# Patient Record
Sex: FEMALE | Race: WHITE | Employment: FULL TIME | URBAN - METROPOLITAN AREA
[De-identification: names, ages, dates, MRNs, and addresses within clinical notes are randomized per-mention and may not be internally consistent; named-entity substitution may affect disease eponyms.]

---

## 2018-03-15 ENCOUNTER — HOSPITAL ENCOUNTER (OUTPATIENT)
Age: 58
Discharge: HOME | End: 2018-03-17

## 2018-03-16 LAB
HBV SURFACE AB TITR SER: NORMAL {TITER}
HEPATITIS B SURFACE ANTIGEN INTERPRETATION: NORMAL

## 2019-01-24 ENCOUNTER — HOSPITAL ENCOUNTER (OUTPATIENT)
Age: 59
Discharge: HOME | End: 2019-01-26

## 2019-01-24 PROCEDURE — 86706 HEP B SURFACE ANTIBODY: CPT

## 2019-01-25 LAB — HBV SURFACE AB TITR SER: REACTIVE {TITER}

## 2021-03-03 ENCOUNTER — OFFICE VISIT (OUTPATIENT)
Dept: PODIATRY | Age: 61
End: 2021-03-03
Payer: COMMERCIAL

## 2021-03-03 VITALS — DIASTOLIC BLOOD PRESSURE: 82 MMHG | WEIGHT: 187 LBS | TEMPERATURE: 98.2 F | SYSTOLIC BLOOD PRESSURE: 130 MMHG

## 2021-03-03 DIAGNOSIS — M25.571 CHRONIC PAIN OF RIGHT ANKLE: ICD-10-CM

## 2021-03-03 DIAGNOSIS — G89.29 CHRONIC PAIN OF RIGHT ANKLE: ICD-10-CM

## 2021-03-03 DIAGNOSIS — M79.671 PAIN IN RIGHT FOOT: Primary | ICD-10-CM

## 2021-03-03 DIAGNOSIS — M95.8 OSTEOCHONDRAL DEFECT OF ANKLE: ICD-10-CM

## 2021-03-03 DIAGNOSIS — M66.879 NONTRAUMATIC RUPTURE OF PERONEAL TENDON: ICD-10-CM

## 2021-03-03 PROCEDURE — 99203 OFFICE O/P NEW LOW 30 MIN: CPT | Performed by: PODIATRIST

## 2021-03-03 NOTE — PROGRESS NOTES
[facility] PODIATRY  [address] [facility] [place] [address]  Dept: [phone]  Dept Fax: [phone]    NEW PATIENT PROGRESS NOTE  Date of patient's visit: 3/4/2021  Patient's Name:  Geoffrey Barnes YOB: 1960            Patient Care Team:  Laura Vaughn DPM as Consulting Physician (Podiatry)        Chief Complaint   Patient presents with   [facility] Doctor     pt has had several sx's by Dr. Mari Velarde over the last 3-5 years he repaired a split tendon, depression of nerves right side and then took bone and scar tissue out. Dr. Mari Velarde last year advised pt to get a second opinion just got a second opinion now. He did MRI's and EMG several years ago and xrays        HPI  HPI:   Geoffrey Barnes is a 62 y.o. female who presents to the office today complaining of right ankle pain. This new patient is seen today for a second opinion regarding right ankle pain. Patient had surgery on her right ankle for a peroneal tendon rupture and also a sural nerve entrapment 5 years ago subsequently patient has been still having pain in the ankle patient has a hard time walking standing. Patient recently has had no other treatments. .  Symptoms began 5 year(s) ago. Patient relates pain is Present. Pain is rated 9 out of 10 and is described as intermittent. Treatments prior to today's visit include: . Currently denies F/C/N/V. Pt's primary care physician is No primary care provider on file. last seen     Allergies   Allergen Reactions    Latex      Leaves marks on skin    Aspirin      sick    Codeine      fatique    Penicillins      swelling    Sulfa Antibiotics      Family history        No past medical history on file. Prior to Admission medications    Not on File       No past surgical history on file. No family history on file.     Social History     Tobacco Use    Smoking status: Former Smoker    Smokeless tobacco: Never Used   Substance Use Topics    Alcohol use: Not on file       Review of Systems    Review of Systems:   History obtained from chart review and the patient  General ROS: negative for - chills, fatigue, fever, night sweats or weight gain  Constitutional: Negative for chills, diaphoresis, fatigue, fever and unexpected weight change. Musculoskeletal: Positive for . Neurological ROS: negative for - behavioral changes, confusion, headaches or seizures. Negative for weakness and numbness. Dermatological ROS: negative for - mole changes, rash  Cardiovascular: Negative for leg swelling. Gastrointestinal: Negative for constipation, diarrhea, nausea and vomiting. Lower Extremity Physical Examination:   Vitals:   Vitals:    03/03/21 1605   BP: 130/82   Temp: 98.2 °F (36.8 °C)        Foot Exam    Right Foot/Ankle     Muscle Strength  Ankle dorsiflexion: 3  Ankle plantar flexion: 3          Right Ankle Exam     Tenderness   The patient is experiencing tenderness in the ATF and lateral malleolus. Swelling: mild    Range of Motion   Dorsiflexion: abnormal   Plantar flexion: abnormal   Eversion: abnormal   Inversion: abnormal     Muscle Strength   Dorsiflexion:  3/5  Plantar flexion:  3/5  Anterior tibial:  3/5  Posterior tibial:  3/5  Gastrocsoleus:  3/5  Peroneal muscle:  3/5    Tests   Anterior drawer: negative  Varus tilt: negative    Other   Erythema: absent  Scars: present  Sensation: decreased  Pulse: present             General: AAO x 3 in NAD. Dermatologic Exam:  Skin lesion/ulceration Absent . Skin No rashes or nodules noted. .   Musculoskeletal:   Examination of the right ankle revealed limited range of motion both dorsiflexion plantarflexion inversion eversion. There was pain with palpation along the lateral fibula as well as the peroneal tendons. Pain with inversion. Negative pain with eversion. Negative equinus noted. There was mild discomfort noted over the sural nerve.      Vascular: +2/4 DP and PT right side.    Radiographs:  3 views Xr Ankle Right (min 3 Views)    Result Date: 3/3/2021  EXAMINATION: THREE XRAY VIEWS OF THE RIGHT ANKLE 3/3/2021 4:18 pm COMPARISON: None. HISTORY: ORDERING SYSTEM PROVIDED HISTORY: Pain in right foot FINDINGS: No fracture or dislocation of the right ankle. Tibial talar joint is maintained on weight-bearing. No radiopaque foreign body. No obvious soft tissue edema. No fracture or dislocation of the right ankle. Xr Foot Right (min 3 Views)    Result Date: 3/3/2021  EXAMINATION: THREE XRAY VIEWS OF THE RIGHT FOOT 3/3/2021 4:18 pm COMPARISON: None. HISTORY: ORDERING SYSTEM PROVIDED HISTORY: Pain in right foot TECHNOLOGIST PROVIDED HISTORY: Reason for exam:->wb FINDINGS: Radiographs of the right foot demonstrate no fractures with preserved relative alignment. Mild pes planus deformity. Mild tibiotalar joint and right large toe metatarsal phalangeal joint degenerative spurring. Anterior and posterior calcaneal enthesophytes. Osseous mineralization is normal.  No soft tissue abnormality. 1.  Mild right large toe and ankle degenerative spurring. Calcaneal enthesophytes. 2.  Mild pes planus deformity. foot/ankle:     Asessment: Patient is a 62 y.o. female with:    Diagnosis Orders   1. Pain in right foot  XR FOOT RIGHT (MIN 3 VIEWS)    XR ANKLE RIGHT (MIN 3 VIEWS)    MRI ANKLE RIGHT WO CONTRAST   2. Chronic pain of right ankle  XR FOOT RIGHT (MIN 3 VIEWS)    XR ANKLE RIGHT (MIN 3 VIEWS)    MRI ANKLE RIGHT WO CONTRAST   3. Nontraumatic rupture of peroneal tendon  MRI ANKLE RIGHT WO CONTRAST   4. Osteochondral defect of ankle  MRI ANKLE RIGHT WO CONTRAST       Plan: Patient examined and evaluated. Today I reviewed the x-ray with the patient working to get an MRI of the right ankle to rule out any osteochondral defect or any peroneal tendon issues and then a new EMG will be done to review the entrapped nerve.   Patient at this time is to be limited weightbearing I will see her back in 1 week after the MRI and EMG current condition and treatment options discussed in detail. Discussed conservative and surgical options with the patient. Treatment options today consisted of verbal and written instructions given to patient. Contact office with any questions/problems/concerns. RTC in 1week(s).     3/3/2021    Electronically signed by Rosemary Hall DPM on 3/4/2021 at 7:23 AM  3/3/2021

## 2021-03-15 ENCOUNTER — HOSPITAL ENCOUNTER (OUTPATIENT)
Dept: MRI IMAGING | Age: 61
Discharge: HOME | End: 2021-03-17
Payer: COMMERCIAL

## 2021-03-15 DIAGNOSIS — G89.29 CHRONIC PAIN OF RIGHT ANKLE: ICD-10-CM

## 2021-03-15 DIAGNOSIS — M79.671 PAIN IN RIGHT FOOT: ICD-10-CM

## 2021-03-15 DIAGNOSIS — M95.8 OSTEOCHONDRAL DEFECT OF ANKLE: ICD-10-CM

## 2021-03-15 DIAGNOSIS — M66.879 NONTRAUMATIC RUPTURE OF PERONEAL TENDON: ICD-10-CM

## 2021-03-15 DIAGNOSIS — M25.571 CHRONIC PAIN OF RIGHT ANKLE: ICD-10-CM

## 2021-03-15 PROCEDURE — 73721 MRI JNT OF LWR EXTRE W/O DYE: CPT

## 2021-03-23 ENCOUNTER — OFFICE VISIT (OUTPATIENT)
Dept: PODIATRY | Age: 61
End: 2021-03-23
Payer: COMMERCIAL

## 2021-03-23 VITALS — TEMPERATURE: 97.2 F | SYSTOLIC BLOOD PRESSURE: 130 MMHG | DIASTOLIC BLOOD PRESSURE: 70 MMHG | WEIGHT: 190 LBS

## 2021-03-23 DIAGNOSIS — S86.311D PERONEAL TENDON RUPTURE, RIGHT, SUBSEQUENT ENCOUNTER: ICD-10-CM

## 2021-03-23 DIAGNOSIS — S93.401D RUPTURE OF LIGAMENT OF ANKLE, RIGHT, SUBSEQUENT ENCOUNTER: Primary | ICD-10-CM

## 2021-03-23 PROCEDURE — 99213 OFFICE O/P EST LOW 20 MIN: CPT | Performed by: PODIATRIST

## 2021-03-23 NOTE — PROGRESS NOTES
[facility] PODIATRY  [address] [facility] [address]   Dept: [phone]  Dept Fax: [phone]     PATIENT PROGRESS NOTE  Date of patient's visit: 3/23/2021  Patient's Name:  Yecenia Ramirez YOB: 1960            Patient Care Team:  Rafael Loza DPM as Consulting Physician (Podiatry)        Chief Complaint   Patient presents with    Foot Pain     right foot we are going over MRI results from [address]        HPI  HPI:   Yecenia Ramirez is a 62 y.o. female who presents to the office today complaining of right ankle pain. This  patient is seen today for a second opinion regarding right ankle pain. Patient had surgery on her right ankle for a peroneal tendon rupture and also a sural nerve entrapment 5 years ago subsequently patient has been still having pain in the ankle patient has a hard time walking standing. Patient recently has had no other treatments. .  Symptoms began 5 year(s) ago. Patient relates pain is Present. Pain is rated 9 out of 10 and is described as intermittent. Treatments prior to today's visit include: . Currently denies F/C/N/V. Pt's primary care physician is No primary care provider on file. Patient is seen today regarding the MRI. Allergies   Allergen Reactions    Latex      Leaves marks on skin    Aspirin      sick    Codeine      fatique    Penicillins      swelling    Sulfa Antibiotics      Family history        No past medical history on file. Prior to Admission medications    Not on File       No past surgical history on file. No family history on file.     Social History     Tobacco Use    Smoking status: Former Smoker    Smokeless tobacco: Never Used   Substance Use Topics    Alcohol use: Not on file       Review of Systems    Review of Systems:   History obtained from chart review and the patient  General ROS: negative for - chills, fatigue, fever, night sweats or weight gain  Constitutional: Negative for chills, diaphoresis, fatigue, fever and unexpected weight change. Musculoskeletal: Positive for . Neurological ROS: negative for - behavioral changes, confusion, headaches or seizures. Negative for weakness and numbness. Dermatological ROS: negative for - mole changes, rash  Cardiovascular: Negative for leg swelling. Gastrointestinal: Negative for constipation, diarrhea, nausea and vomiting. Lower Extremity Physical Examination:   Vitals:   Vitals:    03/23/21 1605   BP: 130/70   Temp: 97.2 °F (36.2 °C)        Foot Exam    Right Foot/Ankle     Muscle Strength  Ankle dorsiflexion: 3  Ankle plantar flexion: 3          Right Ankle Exam     Tenderness   The patient is experiencing tenderness in the ATF and lateral malleolus. Swelling: mild    Range of Motion   Dorsiflexion: abnormal   Plantar flexion: abnormal   Eversion: abnormal   Inversion: abnormal     Muscle Strength   Dorsiflexion:  3/5  Plantar flexion:  3/5  Anterior tibial:  3/5  Posterior tibial:  3/5  Gastrocsoleus:  3/5  Peroneal muscle:  3/5    Tests   Anterior drawer: negative  Varus tilt: negative    Other   Erythema: absent  Scars: present  Sensation: decreased  Pulse: present             General: AAO x 3 in NAD. Dermatologic Exam:  Skin lesion/ulceration Absent . Skin No rashes or nodules noted. .   Musculoskeletal:   Examination of the right ankle revealed limited range of motion both dorsiflexion plantarflexion inversion eversion. There was pain with palpation along the lateral fibula as well as the peroneal tendons. Pain with inversion. Negative pain with eversion. Negative equinus noted. There was mild discomfort noted over the sural nerve. Vascular: +2/4 DP and PT right side. Radiographs:  3 views Xr Ankle Right (min 3 Views)    Result Date: 3/3/2021  EXAMINATION: THREE XRAY VIEWS OF THE RIGHT ANKLE 3/3/2021 4:18 pm COMPARISON: None.  HISTORY: ORDERING SYSTEM PROVIDED HISTORY: Pain in right foot FINDINGS: No fracture or dislocation of the right ankle. Tibial talar joint is maintained on weight-bearing. No radiopaque foreign body. No obvious soft tissue edema. No fracture or dislocation of the right ankle. Xr Foot Right (min 3 Views)    Result Date: 3/3/2021  EXAMINATION: THREE XRAY VIEWS OF THE RIGHT FOOT 3/3/2021 4:18 pm COMPARISON: None. HISTORY: ORDERING SYSTEM PROVIDED HISTORY: Pain in right foot TECHNOLOGIST PROVIDED HISTORY: Reason for exam:->wb FINDINGS: Radiographs of the right foot demonstrate no fractures with preserved relative alignment. Mild pes planus deformity. Mild tibiotalar joint and right large toe metatarsal phalangeal joint degenerative spurring. Anterior and posterior calcaneal enthesophytes. Osseous mineralization is normal.  No soft tissue abnormality. 1.  Mild right large toe and ankle degenerative spurring. Calcaneal enthesophytes. 2.  Mild pes planus deformity. foot/ankle:   Xr Ankle Right (min 3 Views)    Result Date: 3/3/2021  EXAMINATION: THREE XRAY VIEWS OF THE RIGHT ANKLE 3/3/2021 4:18 pm COMPARISON: None. HISTORY: ORDERING SYSTEM PROVIDED HISTORY: Pain in right foot FINDINGS: No fracture or dislocation of the right ankle. Tibial talar joint is maintained on weight-bearing. No radiopaque foreign body. No obvious soft tissue edema. No fracture or dislocation of the right ankle. Xr Foot Right (min 3 Views)    Result Date: 3/3/2021  EXAMINATION: THREE XRAY VIEWS OF THE RIGHT FOOT 3/3/2021 4:18 pm COMPARISON: None. HISTORY: ORDERING SYSTEM PROVIDED HISTORY: Pain in right foot TECHNOLOGIST PROVIDED HISTORY: Reason for exam:->wb FINDINGS: Radiographs of the right foot demonstrate no fractures with preserved relative alignment. Mild pes planus deformity. Mild tibiotalar joint and right large toe metatarsal phalangeal joint degenerative spurring. Anterior and posterior calcaneal enthesophytes.   Osseous mineralization is normal.  No soft tissue abnormality. 1.  Mild right large toe and ankle degenerative spurring. Calcaneal enthesophytes. 2.  Mild pes planus deformity. Mri Ankle Right Wo Contrast    Result Date: 3/19/2021  EXAMINATION: MRI OF THE RIGHT ANKLE WITHOUT CONTRAST, 3/15/2021 7:13 am TECHNIQUE: Multiplanar multisequence MRI of the right ankle was performed without the administration of intravenous contrast. COMPARISON: Radiograph of the right ankle dated 3/3/2021 HISTORY: ORDERING SYSTEM PROVIDED HISTORY: Osteochondral defect of ankle FINDINGS: SYNDESMOTIC LIGAMENTS: The syndesmotic ligaments appear intact. LATERAL COLLATERAL LIGAMENT COMPLEX: There is mild thickening of the anterior talofibular ligament, which may reflect prior sprain. The calcaneofibular ligament appears intact. The posterior talofibular ligament appears intact. DELTOID LIGAMENT COMPLEX: There is loss of fatty striations of the deep fibers of the deltoid ligament, which may reflect prior sprain. SINUS TARSI AND SPRING LIGAMENT: The fat signal intensity of the sinus tarsus appears preserved. The visualized portions of the spring ligament appear intact. MEDIAL TENDONS: There is minimal tenosynovitis affecting the posterior tibialis tendon. The flexor digitorum longus and flexor hallucis longus tendons appear intact. LATERAL TENDONS: There is increased signal intensity seen affecting the peroneus longus tendon and irregular morphology as it courses lateral to the calcaneus, likely reflecting mild to moderate tendinosis. The peroneus brevis tendon appears intact. There is minimal tenosynovitis affecting the peroneal tendons. ANTERIOR TENDONS: The anterior extensor tendons appear intact. ACHILLES TENDON: The Achilles tendon appears intact. PLANTAR FASCIA: The plantar fascia appears intact. TARSAL TUNNEL: There are no obstructing lesions within the tarsal tunnel. BONE MARROW: No evidence of acute fracture or dislocation is seen.   The talar dome appears intact without nonsurgical treatments, including risks and benefits. From a nonoperative standpoint, we discussed rest/activity modification, NSAIDs/Acetaminophen/topical anesthetics, orthotics, bracing/immobilization, and physical therapy. After conservative treatment has failed patient agrees and would like to have surgery today, we reviewed all aspects of the surgery including the risks the complications and the postop course there are no guarantees,   Patient agrees with surgery   we reviewed all risk and benefits all complications including anesthesia infection further surgery over under correction patient agrees to proceed with surgery. General the terms and procedures of the treatment were undertaken there was alternative procedures and methods discussed with the patient the patient declined these and opted for surgery. All the risks with the procedure were reviewed.   Patient will have surgery at [address] [place] [address] in the future       3/23/2021    Electronically signed by Rosi Alexis DPM on 3/23/2021 at 4:54 PM  3/23/2021

## 2021-03-23 NOTE — PATIENT INSTRUCTIONS
Surgery will be scheduled on 5/7/21 at [facility] in  [place]  They will contact you to give you all your preop instructions  Do not eat or drink anything after midnight on 5/6/2021 nothing morning of surgery  Call Leland Dunlap if any issues [phone]    Someone needs to drive you to and from the surgery   And no driving 2-3 weeks after surgery    No anti-inflammatories 7 days prior to sx

## 2021-03-25 ENCOUNTER — TELEPHONE (OUTPATIENT)
Dept: PODIATRY | Age: 61
End: 2021-03-25

## 2021-04-05 ENCOUNTER — PREP FOR PROCEDURE (OUTPATIENT)
Dept: PODIATRY | Age: 61
End: 2021-04-05

## 2021-04-15 ENCOUNTER — TELEPHONE (OUTPATIENT)
Dept: ADMINISTRATIVE | Age: 61
End: 2021-04-15

## 2021-04-15 ENCOUNTER — OFFICE VISIT (OUTPATIENT)
Dept: PRIMARY CARE CLINIC | Age: 61
End: 2021-04-15
Payer: COMMERCIAL

## 2021-04-15 VITALS
DIASTOLIC BLOOD PRESSURE: 82 MMHG | BODY MASS INDEX: 32.61 KG/M2 | RESPIRATION RATE: 16 BRPM | HEIGHT: 64 IN | OXYGEN SATURATION: 98 % | HEART RATE: 98 BPM | SYSTOLIC BLOOD PRESSURE: 126 MMHG | TEMPERATURE: 97.6 F | WEIGHT: 191 LBS

## 2021-04-15 DIAGNOSIS — R94.31 ABNORMAL EKG: ICD-10-CM

## 2021-04-15 DIAGNOSIS — Z01.818 PRE-OPERATIVE EXAMINATION: ICD-10-CM

## 2021-04-15 DIAGNOSIS — Z01.818 PREOP EXAMINATION: Primary | ICD-10-CM

## 2021-04-15 DIAGNOSIS — Z01.818 PRE-OPERATIVE EXAMINATION: Primary | ICD-10-CM

## 2021-04-15 DIAGNOSIS — Z01.810 PRE-OPERATIVE CARDIOVASCULAR EXAMINATION: ICD-10-CM

## 2021-04-15 PROBLEM — E03.9 HYPOTHYROIDISM: Status: ACTIVE | Noted: 2021-04-15

## 2021-04-15 LAB
ALBUMIN SERPL-MCNC: 4.7 G/DL (ref 3.5–5.2)
ALP BLD-CCNC: 76 U/L (ref 35–104)
ALT SERPL-CCNC: 19 U/L (ref 0–32)
ANION GAP SERPL CALCULATED.3IONS-SCNC: 15 MMOL/L (ref 7–16)
AST SERPL-CCNC: 16 U/L (ref 0–31)
BACTERIA: ABNORMAL /HPF
BASOPHILS ABSOLUTE: 0.06 E9/L (ref 0–0.2)
BASOPHILS RELATIVE PERCENT: 1.3 % (ref 0–2)
BILIRUB SERPL-MCNC: 0.3 MG/DL (ref 0–1.2)
BILIRUBIN URINE: NEGATIVE
BLOOD, URINE: NEGATIVE
BUN BLDV-MCNC: 17 MG/DL (ref 8–23)
C-REACTIVE PROTEIN: 0.3 MG/DL (ref 0–0.4)
CALCIUM SERPL-MCNC: 9.9 MG/DL (ref 8.6–10.2)
CHLORIDE BLD-SCNC: 104 MMOL/L (ref 98–107)
CHOLESTEROL, TOTAL: 296 MG/DL (ref 0–199)
CLARITY: CLEAR
CO2: 25 MMOL/L (ref 22–29)
COLOR: YELLOW
CREAT SERPL-MCNC: 0.8 MG/DL (ref 0.5–1)
CREATININE URINE: 118 MG/DL (ref 29–226)
EOSINOPHILS ABSOLUTE: 0.19 E9/L (ref 0.05–0.5)
EOSINOPHILS RELATIVE PERCENT: 4 % (ref 0–6)
EPITHELIAL CELLS, UA: ABNORMAL /HPF
GFR AFRICAN AMERICAN: >60
GFR NON-AFRICAN AMERICAN: >60 ML/MIN/1.73
GLUCOSE BLD-MCNC: 122 MG/DL (ref 74–99)
GLUCOSE URINE: NEGATIVE MG/DL
HBA1C MFR BLD: 5.5 % (ref 4–5.6)
HCT VFR BLD CALC: 44 % (ref 34–48)
HDLC SERPL-MCNC: 87 MG/DL
HEMOGLOBIN: 13.9 G/DL (ref 11.5–15.5)
IMMATURE GRANULOCYTES #: 0 E9/L
IMMATURE GRANULOCYTES %: 0 % (ref 0–5)
KETONES, URINE: NEGATIVE MG/DL
LDL CHOLESTEROL CALCULATED: 189 MG/DL (ref 0–99)
LEUKOCYTE ESTERASE, URINE: ABNORMAL
LYMPHOCYTES ABSOLUTE: 1.16 E9/L (ref 1.5–4)
LYMPHOCYTES RELATIVE PERCENT: 24.4 % (ref 20–42)
MCH RBC QN AUTO: 28.4 PG (ref 26–35)
MCHC RBC AUTO-ENTMCNC: 31.6 % (ref 32–34.5)
MCV RBC AUTO: 89.8 FL (ref 80–99.9)
MICROALBUMIN UR-MCNC: <12 MG/L
MICROALBUMIN/CREAT UR-RTO: ABNORMAL (ref 0–30)
MONOCYTES ABSOLUTE: 0.47 E9/L (ref 0.1–0.95)
MONOCYTES RELATIVE PERCENT: 9.9 % (ref 2–12)
NEUTROPHILS ABSOLUTE: 2.87 E9/L (ref 1.8–7.3)
NEUTROPHILS RELATIVE PERCENT: 60.4 % (ref 43–80)
NITRITE, URINE: NEGATIVE
PDW BLD-RTO: 12.9 FL (ref 11.5–15)
PH UA: 7.5 (ref 5–9)
PLATELET # BLD: 214 E9/L (ref 130–450)
PMV BLD AUTO: 11.6 FL (ref 7–12)
POTASSIUM SERPL-SCNC: 4.6 MMOL/L (ref 3.5–5)
PROTEIN UA: NEGATIVE MG/DL
RBC # BLD: 4.9 E12/L (ref 3.5–5.5)
RBC UA: ABNORMAL /HPF (ref 0–2)
SODIUM BLD-SCNC: 144 MMOL/L (ref 132–146)
SPECIFIC GRAVITY UA: 1.01 (ref 1–1.03)
T3 FREE: 2.7 PG/ML (ref 2–4.4)
T4 FREE: 0.97 NG/DL (ref 0.93–1.7)
TOTAL PROTEIN: 7.4 G/DL (ref 6.4–8.3)
TRIGL SERPL-MCNC: 98 MG/DL (ref 0–149)
TROPONIN: <0.01 NG/ML (ref 0–0.03)
TSH SERPL DL<=0.05 MIU/L-ACNC: 2.6 UIU/ML (ref 0.27–4.2)
URIC ACID, SERUM: 4.4 MG/DL (ref 2.4–5.7)
UROBILINOGEN, URINE: 1 E.U./DL
VLDLC SERPL CALC-MCNC: 20 MG/DL
WBC # BLD: 4.8 E9/L (ref 4.5–11.5)
WBC UA: ABNORMAL /HPF (ref 0–5)

## 2021-04-15 PROCEDURE — 99244 OFF/OP CNSLTJ NEW/EST MOD 40: CPT | Performed by: FAMILY MEDICINE

## 2021-04-15 PROCEDURE — 93000 ELECTROCARDIOGRAM COMPLETE: CPT | Performed by: FAMILY MEDICINE

## 2021-04-15 ASSESSMENT — PATIENT HEALTH QUESTIONNAIRE - PHQ9
SUM OF ALL RESPONSES TO PHQ QUESTIONS 1-9: 0
SUM OF ALL RESPONSES TO PHQ QUESTIONS 1-9: 0
2. FEELING DOWN, DEPRESSED OR HOPELESS: 0

## 2021-04-15 NOTE — TELEPHONE ENCOUNTER
NP scheduled from the [facility]. Patient Appointment Form:      PCP: Dr. Reji Aguilar  Referring: Dr. Reji Aguilar    Has the Patient:    Seen a Cardiologist? No     Had a heart catheterization? No    Had heart surgery? No    Had a stress test or nuclear stress test? No    Had an echocardiogram? No    Had a vascular ultrasound? No    Had a 24/48 heart monitor or extended cardiac event monitor? No    Had recent blood work in the last 6 months? Yes 4/15/21 Epic - PCP     Had a pacemaker/ICD/ILR implant? No    Seen an Electrophysiologist? No        Will send records via: No prior Cardiology hx or recs. Date & time of appointment:  4/19/21 @ 7:45 with Dr. Charu Shankar.

## 2021-04-15 NOTE — LETTER
[address], [address]  Phone: [phone]  Fax: [phone]    Christa Bueno DO          To Dr Clementina Colón:    It is my medical opinion that Angela Larkin  is deemed to be an acceptable risk and is medically optimized for upcoming surgery pending stress test and blood work. It is my belief that she will be an acceptable candidate for any sedation that is planned for the above noted procedure. If you have any questions or concerns, please don't hesitate to call.     Sincerely,        Christa Bueno DO

## 2021-04-15 NOTE — PROGRESS NOTES
Subjective:      Melecio Kign is a 62 y.o. female who presents to the office today for a preoperative consultation at the request of surgeon Dr Kulwinder Garcia who plans on performing right peroneal tendon repair on April 27. This consultation is requested for the specific conditions prompting preoperative evaluation (i.e. because of potential affect on operative risk): Abnormal EKG and possible unknown cardiac disease. Planned anesthesia is General.  The patient has the following known anesthesia issues: None  Patient has a bleeding risk of : no recent abnormal bleeding, no remote history of abnormal bleeding, no use of Ca-channel blockers  Patient does not have objection to receiving blood products if needed. History reviewed. No pertinent past medical history. Patient Active Problem List    Diagnosis Date Noted    Hypothyroidism 04/15/2021     History reviewed. No pertinent surgical history. History reviewed. No pertinent family history.   Social History     Socioeconomic History    Marital status:      Spouse name: None    Number of children: None    Years of education: None    Highest education level: None   Occupational History    None   Social Needs    Financial resource strain: None    Food insecurity     Worry: None     Inability: None    Transportation needs     Medical: None     Non-medical: None   Tobacco Use    Smoking status: Former Smoker    Smokeless tobacco: Never Used   Substance and Sexual Activity    Alcohol use: None    Drug use: None    Sexual activity: None   Lifestyle    Physical activity     Days per week: None     Minutes per session: None    Stress: None   Relationships    Social connections     Talks on phone: None     Gets together: None     Attends Anabaptism service: None     Active member of club or organization: None     Attends meetings of clubs or organizations: None     Relationship status: None    Intimate partner violence     Fear of current or ex partner: None     Emotionally abused: None     Physically abused: None     Forced sexual activity: None   Other Topics Concern    None   Social History Narrative    None     No current outpatient medications on file. No current facility-administered medications for this visit. No current outpatient medications on file prior to visit. No current facility-administered medications on file prior to visit. Allergies   Allergen Reactions    Latex      Leaves marks on skin    Aspirin Hives and Shortness Of Breath     sick  Other reaction(s): Disease type AND/OR category unknown (finding), GI Upset    Codeine Hives and Shortness Of Breath     fatique  Other reaction(s): Disease type AND/OR category unknown (finding)    Penicillins Hives and Shortness Of Breath     swelling  Other reaction(s): Disease type AND/OR category unknown (finding), GI Upset  headaches      Soap Hives and Shortness Of Breath    Bee Venom      Other reaction(s): Disease type AND/OR category unknown (finding)    Sulfa Antibiotics      Family history      Review of Systems  Pertinent items are noted in HPI. Objective:       Physical Exam  Vitals signs reviewed. HENT:      Head: Normocephalic and atraumatic. Eyes:      General: No scleral icterus. Conjunctiva/sclera: Conjunctivae normal.      Pupils: Pupils are equal, round, and reactive to light. Neck:      Musculoskeletal: Neck supple. Thyroid: No thyromegaly. Cardiovascular:      Rate and Rhythm: Normal rate and regular rhythm. Heart sounds: Normal heart sounds. No murmur. Pulmonary:      Effort: Pulmonary effort is normal.      Breath sounds: Decreased breath sounds present. No rales. Abdominal:      General: Bowel sounds are normal. There is no distension. Palpations: Abdomen is soft. Tenderness: There is no abdominal tenderness. Musculoskeletal:         General: Tenderness and signs of injury present.    Lymphadenopathy:      Cervical: No cervical adenopathy. Skin:     General: Skin is warm and dry. Findings: No erythema or rash. Neurological:      Mental Status: She is alert and oriented to person, place, and time. Cranial Nerves: No cranial nerve deficit. Gait: Gait abnormal.   Psychiatric:         Judgment: Judgment normal.           Predictors of intubation difficulty:   Morbid obesity? no   Anatomically abnormal facies? no   Prominent incisors? no   Receding mandible? no   Short, thick neck? no   Neck range of motion: normal      Cardiographics  EKG shows ventricular rate of 74 bpm, AK interval of 164, QTC of 410 with QRS duration of 106. Rhythm is sinus with leftward axis. RSR prime in V1 with right bundle branch block and anterior fascicular block noted. No acute ST segment or T wave changes noted. Deep S wave in moderate T wave flattening in lead III without reciprocal changes. No other changes noted. Assessment:        62 y.o. female with planned surgery as above. Known risk factors for perioperative complications: Unknown cardiac disease    Difficulty with intubation is not anticipated. Current medications which may produce withdrawal symptoms if withheld perioperatively: None   Plan:      1. Preoperative workup as follows cardiac stress testing to exclude undiagnosed coronary disease (abnormal EKG noted), ECG, hemoglobin, hematocrit, electrolytes, creatinine, glucose, liver function studies  2. Change in medication regimen before surgery: none, continue med regimen including morning of surgery, w/sip of water  3. Prophylaxis for cardiac events with perioperative beta-blockers: should be considered, specific regimen per anesthesia  4. Invasive hemodynamic monitoring perioperatively: at the discretion of anesthesiologist  5. Deep vein thrombosis prophylaxis postoperatively:regimen to be chosen by surgical team  6.  Surveillance for postoperative MI with ECG immediately postoperatively and on

## 2021-04-16 ENCOUNTER — TELEPHONE (OUTPATIENT)
Dept: CARDIOLOGY | Age: 61
End: 2021-04-16

## 2021-04-16 LAB — HEPATITIS C ANTIBODY INTERPRETATION: NORMAL

## 2021-04-16 NOTE — TELEPHONE ENCOUNTER
SCHEDULED STRESS TEST FOR 04-28-21. REVIEWED COVID CHECKLIST WITH PATIENT.     Electronically signed by Phil Aguirre on 4/16/2021 at 12:34 PM

## 2021-04-19 ENCOUNTER — OFFICE VISIT (OUTPATIENT)
Dept: CARDIOLOGY CLINIC | Age: 61
End: 2021-04-19
Payer: COMMERCIAL

## 2021-04-19 ENCOUNTER — TELEPHONE (OUTPATIENT)
Dept: CARDIOLOGY | Age: 61
End: 2021-04-19

## 2021-04-19 VITALS
DIASTOLIC BLOOD PRESSURE: 84 MMHG | HEART RATE: 64 BPM | BODY MASS INDEX: 32.64 KG/M2 | HEIGHT: 64 IN | SYSTOLIC BLOOD PRESSURE: 145 MMHG | WEIGHT: 191.2 LBS | RESPIRATION RATE: 18 BRPM

## 2021-04-19 DIAGNOSIS — Z01.810 PREOP CARDIOVASCULAR EXAM: Primary | ICD-10-CM

## 2021-04-19 DIAGNOSIS — M79.673 PAIN OF FOOT, UNSPECIFIED LATERALITY: ICD-10-CM

## 2021-04-19 DIAGNOSIS — R94.31 ABNORMAL EKG: ICD-10-CM

## 2021-04-19 LAB
% CKMB: 0 % (ref 0–4)
CK-BB: 0 % (ref 0–0)
CK-MACRO TYPE I: 0 % (ref 0–0)
CK-MACRO TYPE II: 0 % (ref 0–0)
CK-MM: 100 % (ref 96–100)
TOTAL CK: 74 U/L (ref 20–180)

## 2021-04-19 PROCEDURE — 93000 ELECTROCARDIOGRAM COMPLETE: CPT | Performed by: INTERNAL MEDICINE

## 2021-04-19 PROCEDURE — 99244 OFF/OP CNSLTJ NEW/EST MOD 40: CPT | Performed by: INTERNAL MEDICINE

## 2021-04-19 NOTE — TELEPHONE ENCOUNTER
PATIENT CAME IN TODAY TO SEE DR. WONG AS A NEW PATIENT. SHE WAS SCHEDULED FOR A JIE STRESS TEST FOR 04-28 ORDERED BY . DR. WONG WANTED THE TEST CANCELLED.     Electronically signed by Kalpana Garcia on 4/19/2021 at 8:16 AM

## 2021-04-19 NOTE — PROGRESS NOTES
Chief Complaint   Patient presents with    Cardiac Clearance       Patient Active Problem List    Diagnosis Date Noted    Abnormal EKG 2021    Hypothyroidism 04/15/2021       No current outpatient medications on file. No current facility-administered medications for this visit.          Allergies   Allergen Reactions    Latex      Leaves marks on skin    Aspirin Hives and Shortness Of Breath     sick  Other reaction(s): Disease type AND/OR category unknown (finding), GI Upset    Codeine Hives and Shortness Of Breath     fatique  Other reaction(s): Disease type AND/OR category unknown (finding)    Penicillins Hives and Shortness Of Breath     swelling  Other reaction(s): Disease type AND/OR category unknown (finding), GI Upset  headaches      Soap Hives and Shortness Of Breath    Bee Venom      Other reaction(s): Disease type AND/OR category unknown (finding)    Sulfa Antibiotics      Family history        Vitals:    21 0740   BP: (!) 145/84   Pulse: 64   Resp: 18   Weight: 191 lb 3.2 oz (86.7 kg)   Height: 5' 4\" (1.626 m)       Social History     Socioeconomic History    Marital status:      Spouse name: Not on file    Number of children: Not on file    Years of education: Not on file    Highest education level: Not on file   Occupational History    Not on file   Social Needs    Financial resource strain: Not on file    Food insecurity     Worry: Not on file     Inability: Not on file    Transportation needs     Medical: Not on file     Non-medical: Not on file   Tobacco Use    Smoking status: Former Smoker     Types: Cigarettes     Quit date: 1996     Years since quittin.0    Smokeless tobacco: Never Used   Substance and Sexual Activity    Alcohol use: Not Currently    Drug use: Never    Sexual activity: Not on file   Lifestyle    Physical activity     Days per week: Not on file     Minutes per session: Not on file    Stress: Not on file   Relationships    Neck supple. No hepatojugular reflux and no JVD present. Carotid bruit is not present. No tracheal deviation present. No thyromegaly present. Cardiovascular: Normal rate, regular rhythm, normal heart sounds and intact distal pulses. Exam reveals no gallop and no friction rub. No murmur heard. Pulmonary/Chest: Effort normal and breath sounds normal. No respiratory distress. No wheezes. No rales. No tenderness. Abdominal: Soft. Bowel sounds are normal. No distension and no mass. No tenderness. No rebound and no guarding. Musculoskeletal: Normal range of motion. No edema and no tenderness. Neurological: Alert and oriented to person, place, and time. Skin: Skin is warm and dry. No rash noted. Not diaphoretic. No erythema. Psychiatric: Normal mood and affect. Behavior is normal.     EKG:  normal sinus rhythm, rate64, axis -63, IRBB with LAFB.     ASSESSMENT AND PLAN:  Patient Active Problem List   Diagnosis    Hypothyroidism    Abnormal EKG     ·  Patient has no high risk clinical predictors of an adverse outcome in surgery, such as recent myocardial infarction, unstable angina, heart failure, rhythm other than sinus, severe obstructive valvular disease,CVA, INSULIN, CKD  · Able to exceed 4 METS with AODLs  · Normal CV exam  · ekg with clinically irrelevant mild conduction disease  · RCRI Class I risk (< 1% chance of cardiac complications)  · OK for surgery without stress test.         Alfredo Gomez M.D  [id number] [facility]

## 2021-04-26 ENCOUNTER — HOSPITAL ENCOUNTER (OUTPATIENT)
Age: 61
Discharge: HOME | End: 2021-04-28
Payer: COMMERCIAL

## 2021-04-26 DIAGNOSIS — Z01.818 PREOP TESTING: ICD-10-CM

## 2021-04-26 PROCEDURE — U0003 INFECTIOUS AGENT DETECTION BY NUCLEIC ACID (DNA OR RNA); SEVERE ACUTE RESPIRATORY SYNDROME CORONAVIRUS 2 (SARS-COV-2) (CORONAVIRUS DISEASE [COVID-19]), AMPLIFIED PROBE TECHNIQUE, MAKING USE OF HIGH THROUGHPUT TECHNOLOGIES AS DESCRIBED BY CMS-2020-01-R: HCPCS

## 2021-04-26 PROCEDURE — U0005 INFEC AGEN DETEC AMPLI PROBE: HCPCS

## 2021-04-27 LAB
SARS-COV-2: NOT DETECTED
SOURCE: NORMAL

## 2021-04-27 NOTE — PROGRESS NOTES
[facility] PRE-ADMISSION TESTING INSTRUCTIONS    The Preadmission Testing patient is instructed accordingly using the following criteria (check applicable):    ARRIVAL INSTRUCTIONS:  [x] Parking the day of Surgery is located in the Main Entrance lot. Upon entering the door, make an immediate right to the surgery reception desk    [x] Bring photo ID and insurance card    [] Bring in a copy of Living will or Durable Power of  papers. [x] Please be sure to arrange for responsible adult to provide transportation to and from the hospital    [x] Please arrange for responsible adult to be with you for the 24 hour period post procedure due to having anesthesia      GENERAL INSTRUCTIONS:    [x] Nothing by mouth after midnight, including gum, candy, mints or water    [x] You may brush your teeth, but do not swallow any water    [] Take medications as instructed with 1-2 oz of water    [] Stop herbal supplements and vitamins 5 days prior to procedure    [] Follow preop dosing of blood thinners per physician instructions    [] Take 1/2 dose of evening insulin, but no insulin after midnight    [] No oral diabetic medications after midnight    [] If diabetic and have low blood sugar or feel symptomatic, take 1-2oz apple juice only    [] Bring inhalers day of surgery    [] Bring C-PAP/ Bi-Pap day of surgery    [] Bring urine specimen day of surgery    [x] Shower or bath with soap, lather and rinse well, AM of Surgery, no lotion, powders or creams to surgical site    [] Follow bowel prep as instructed per surgeon    [x] No tobacco products within 24 hours of surgery     [x] No alcohol or illegal drug use within 24 hours of surgery.     [x] Jewelry, body piercing's, eyeglasses, contact lenses and dentures are not permitted into surgery (bring cases)      [x] Please do not wear any nail polish, make up or hair products on the day of surgery    [x] You can expect a call the business day prior to

## 2021-04-27 NOTE — PROGRESS NOTES
Have you been tested for COVID  Yes           Have you been told you were positive for COVID No  Have you had any known exposure to someone that is positive for COVID No  Do you have a cough                   No              Do you have shortness of breath No                 Do you have a sore throat            No                Are you having chills                    No                Are you having muscle aches. No                    Please come to the hospital wearing a mask and have your significant other wear a mask as well. Both of you should check your temperature before leaving to come here,  if it is 100 or higher please call [phone] for instruction.

## 2021-04-30 ENCOUNTER — ANESTHESIA (OUTPATIENT)
Dept: OPERATING ROOM | Age: 61
End: 2021-04-30
Payer: COMMERCIAL

## 2021-04-30 ENCOUNTER — ANESTHESIA EVENT (OUTPATIENT)
Dept: OPERATING ROOM | Age: 61
End: 2021-04-30
Payer: COMMERCIAL

## 2021-04-30 ENCOUNTER — PREP FOR PROCEDURE (OUTPATIENT)
Dept: PODIATRY | Age: 61
End: 2021-04-30

## 2021-04-30 ENCOUNTER — APPOINTMENT (OUTPATIENT)
Dept: GENERAL RADIOLOGY | Age: 61
End: 2021-04-30
Attending: PODIATRIST
Payer: COMMERCIAL

## 2021-04-30 ENCOUNTER — HOSPITAL ENCOUNTER (OUTPATIENT)
Age: 61
Setting detail: OUTPATIENT SURGERY
Discharge: HOME | End: 2021-04-30
Attending: PODIATRIST | Admitting: PODIATRIST
Payer: COMMERCIAL

## 2021-04-30 VITALS
SYSTOLIC BLOOD PRESSURE: 159 MMHG | HEIGHT: 64 IN | BODY MASS INDEX: 32.44 KG/M2 | WEIGHT: 190 LBS | OXYGEN SATURATION: 95 % | DIASTOLIC BLOOD PRESSURE: 74 MMHG | TEMPERATURE: 96.8 F | HEART RATE: 59 BPM | RESPIRATION RATE: 19 BRPM

## 2021-04-30 VITALS — OXYGEN SATURATION: 96 % | DIASTOLIC BLOOD PRESSURE: 100 MMHG | SYSTOLIC BLOOD PRESSURE: 162 MMHG | TEMPERATURE: 96.4 F

## 2021-04-30 DIAGNOSIS — G89.18 PAIN FOLLOWING SURGERY OR PROCEDURE: Primary | ICD-10-CM

## 2021-04-30 DIAGNOSIS — R52 PAIN: ICD-10-CM

## 2021-04-30 DIAGNOSIS — Z01.818 PREOP TESTING: Primary | ICD-10-CM

## 2021-04-30 PROCEDURE — 27698 REPAIR OF ANKLE LIGAMENT: CPT | Performed by: PODIATRIST

## 2021-04-30 PROCEDURE — 2709999900 HC NON-CHARGEABLE SUPPLY: Performed by: PODIATRIST

## 2021-04-30 PROCEDURE — 88304 TISSUE EXAM BY PATHOLOGIST: CPT

## 2021-04-30 PROCEDURE — 7100000000 HC PACU RECOVERY - FIRST 15 MIN: Performed by: PODIATRIST

## 2021-04-30 PROCEDURE — 6360000002 HC RX W HCPCS: Performed by: NURSE ANESTHETIST, CERTIFIED REGISTERED

## 2021-04-30 PROCEDURE — 7100000010 HC PHASE II RECOVERY - FIRST 15 MIN: Performed by: PODIATRIST

## 2021-04-30 PROCEDURE — 7100000011 HC PHASE II RECOVERY - ADDTL 15 MIN: Performed by: PODIATRIST

## 2021-04-30 PROCEDURE — 3700000000 HC ANESTHESIA ATTENDED CARE: Performed by: PODIATRIST

## 2021-04-30 PROCEDURE — 6370000000 HC RX 637 (ALT 250 FOR IP): Performed by: ANESTHESIOLOGY

## 2021-04-30 PROCEDURE — 3209999900 FLUORO FOR SURGICAL PROCEDURES

## 2021-04-30 PROCEDURE — 2580000003 HC RX 258: Performed by: NURSE ANESTHETIST, CERTIFIED REGISTERED

## 2021-04-30 PROCEDURE — 3600000003 HC SURGERY LEVEL 3 BASE: Performed by: PODIATRIST

## 2021-04-30 PROCEDURE — 7100000001 HC PACU RECOVERY - ADDTL 15 MIN: Performed by: PODIATRIST

## 2021-04-30 PROCEDURE — 3700000001 HC ADD 15 MINUTES (ANESTHESIA): Performed by: PODIATRIST

## 2021-04-30 PROCEDURE — 27659 REPAIR OF LEG TENDON EACH: CPT | Performed by: PODIATRIST

## 2021-04-30 PROCEDURE — 2500000003 HC RX 250 WO HCPCS: Performed by: PODIATRIST

## 2021-04-30 PROCEDURE — 6360000002 HC RX W HCPCS: Performed by: PODIATRIST

## 2021-04-30 PROCEDURE — 2580000003 HC RX 258: Performed by: PODIATRIST

## 2021-04-30 PROCEDURE — 0232T NJX PLATELET PLASMA: CPT | Performed by: PODIATRIST

## 2021-04-30 PROCEDURE — 2720000010 HC SURG SUPPLY STERILE: Performed by: PODIATRIST

## 2021-04-30 PROCEDURE — C1713 ANCHOR/SCREW BN/BN,TIS/BN: HCPCS | Performed by: PODIATRIST

## 2021-04-30 PROCEDURE — 2500000003 HC RX 250 WO HCPCS: Performed by: NURSE ANESTHETIST, CERTIFIED REGISTERED

## 2021-04-30 PROCEDURE — C1762 CONN TISS, HUMAN(INC FASCIA): HCPCS | Performed by: PODIATRIST

## 2021-04-30 PROCEDURE — 64708 REVISE ARM/LEG NERVE: CPT | Performed by: PODIATRIST

## 2021-04-30 PROCEDURE — 3600000013 HC SURGERY LEVEL 3 ADDTL 15MIN: Performed by: PODIATRIST

## 2021-04-30 DEVICE — GRAFT HUM TISS W40XL70MM THK1MM ACELLULAR ARTHROFLEX: Type: IMPLANTABLE DEVICE | Site: ANKLE | Status: FUNCTIONAL

## 2021-04-30 DEVICE — ANCHOR SUT NDL DX FIBERTAK: Type: IMPLANTABLE DEVICE | Site: ANKLE | Status: FUNCTIONAL

## 2021-04-30 DEVICE — DEVICE GRFT FIX FOR LIGMNT AUG ANCHR REP PEEK INT BRAC: Type: IMPLANTABLE DEVICE | Site: ANKLE | Status: FUNCTIONAL

## 2021-04-30 RX ORDER — NEOSTIGMINE METHYLSULFATE 1 MG/ML
INJECTION, SOLUTION INTRAVENOUS PRN
Status: DISCONTINUED | OUTPATIENT
Start: 2021-04-30 | End: 2021-04-30 | Stop reason: SDUPTHER

## 2021-04-30 RX ORDER — SODIUM CHLORIDE 0.9 % (FLUSH) 0.9 %
5-40 SYRINGE (ML) INJECTION PRN
Status: CANCELLED | OUTPATIENT
Start: 2021-04-30

## 2021-04-30 RX ORDER — SODIUM CHLORIDE 0.9 % (FLUSH) 0.9 %
5-40 SYRINGE (ML) INJECTION PRN
Status: DISCONTINUED | OUTPATIENT
Start: 2021-04-30 | End: 2021-04-30 | Stop reason: HOSPADM

## 2021-04-30 RX ORDER — FENTANYL CITRATE 50 UG/ML
INJECTION, SOLUTION INTRAMUSCULAR; INTRAVENOUS PRN
Status: DISCONTINUED | OUTPATIENT
Start: 2021-04-30 | End: 2021-04-30 | Stop reason: SDUPTHER

## 2021-04-30 RX ORDER — LIDOCAINE HYDROCHLORIDE 20 MG/ML
INJECTION, SOLUTION EPIDURAL; INFILTRATION; INTRACAUDAL; PERINEURAL PRN
Status: DISCONTINUED | OUTPATIENT
Start: 2021-04-30 | End: 2021-04-30 | Stop reason: SDUPTHER

## 2021-04-30 RX ORDER — SODIUM CHLORIDE 0.9 % (FLUSH) 0.9 %
5-40 SYRINGE (ML) INJECTION EVERY 12 HOURS SCHEDULED
Status: CANCELLED | OUTPATIENT
Start: 2021-04-30

## 2021-04-30 RX ORDER — SODIUM CHLORIDE 9 MG/ML
INJECTION, SOLUTION INTRAVENOUS CONTINUOUS PRN
Status: DISCONTINUED | OUTPATIENT
Start: 2021-04-30 | End: 2021-04-30 | Stop reason: SDUPTHER

## 2021-04-30 RX ORDER — MIDAZOLAM HYDROCHLORIDE 1 MG/ML
INJECTION INTRAMUSCULAR; INTRAVENOUS PRN
Status: DISCONTINUED | OUTPATIENT
Start: 2021-04-30 | End: 2021-04-30 | Stop reason: SDUPTHER

## 2021-04-30 RX ORDER — BUPIVACAINE HYDROCHLORIDE 5 MG/ML
INJECTION, SOLUTION EPIDURAL; INTRACAUDAL PRN
Status: DISCONTINUED | OUTPATIENT
Start: 2021-04-30 | End: 2021-04-30 | Stop reason: ALTCHOICE

## 2021-04-30 RX ORDER — FENTANYL CITRATE 50 UG/ML
25 INJECTION, SOLUTION INTRAMUSCULAR; INTRAVENOUS EVERY 5 MIN PRN
Status: DISCONTINUED | OUTPATIENT
Start: 2021-04-30 | End: 2021-04-30 | Stop reason: HOSPADM

## 2021-04-30 RX ORDER — ROCURONIUM BROMIDE 10 MG/ML
INJECTION, SOLUTION INTRAVENOUS PRN
Status: DISCONTINUED | OUTPATIENT
Start: 2021-04-30 | End: 2021-04-30 | Stop reason: SDUPTHER

## 2021-04-30 RX ORDER — GLYCOPYRROLATE 1 MG/5 ML
SYRINGE (ML) INTRAVENOUS PRN
Status: DISCONTINUED | OUTPATIENT
Start: 2021-04-30 | End: 2021-04-30 | Stop reason: SDUPTHER

## 2021-04-30 RX ORDER — SODIUM CHLORIDE 9 MG/ML
25 INJECTION, SOLUTION INTRAVENOUS PRN
Status: CANCELLED | OUTPATIENT
Start: 2021-04-30

## 2021-04-30 RX ORDER — SODIUM CHLORIDE 0.9 % (FLUSH) 0.9 %
5-40 SYRINGE (ML) INJECTION EVERY 12 HOURS SCHEDULED
Status: DISCONTINUED | OUTPATIENT
Start: 2021-04-30 | End: 2021-04-30 | Stop reason: HOSPADM

## 2021-04-30 RX ORDER — PROPOFOL 10 MG/ML
INJECTION, EMULSION INTRAVENOUS PRN
Status: DISCONTINUED | OUTPATIENT
Start: 2021-04-30 | End: 2021-04-30 | Stop reason: SDUPTHER

## 2021-04-30 RX ORDER — HYDROCODONE BITARTRATE AND ACETAMINOPHEN 5; 325 MG/1; MG/1
1 TABLET ORAL EVERY 6 HOURS PRN
Qty: 20 TABLET | Refills: 0 | Status: SHIPPED | OUTPATIENT
Start: 2021-04-30 | End: 2021-05-07

## 2021-04-30 RX ORDER — HYDROCODONE BITARTRATE AND ACETAMINOPHEN 5; 325 MG/1; MG/1
1 TABLET ORAL
Status: COMPLETED | OUTPATIENT
Start: 2021-04-30 | End: 2021-04-30

## 2021-04-30 RX ORDER — ONDANSETRON 2 MG/ML
INJECTION INTRAMUSCULAR; INTRAVENOUS PRN
Status: DISCONTINUED | OUTPATIENT
Start: 2021-04-30 | End: 2021-04-30 | Stop reason: SDUPTHER

## 2021-04-30 RX ORDER — LABETALOL HYDROCHLORIDE 5 MG/ML
INJECTION, SOLUTION INTRAVENOUS PRN
Status: DISCONTINUED | OUTPATIENT
Start: 2021-04-30 | End: 2021-04-30 | Stop reason: SDUPTHER

## 2021-04-30 RX ORDER — SODIUM CHLORIDE 9 MG/ML
25 INJECTION, SOLUTION INTRAVENOUS PRN
Status: DISCONTINUED | OUTPATIENT
Start: 2021-04-30 | End: 2021-04-30 | Stop reason: HOSPADM

## 2021-04-30 RX ADMIN — MIDAZOLAM 2 MG: 1 INJECTION INTRAMUSCULAR; INTRAVENOUS at 07:07

## 2021-04-30 RX ADMIN — FENTANYL CITRATE 50 MCG: 50 INJECTION, SOLUTION INTRAMUSCULAR; INTRAVENOUS at 07:24

## 2021-04-30 RX ADMIN — PROPOFOL 180 MG: 10 INJECTION, EMULSION INTRAVENOUS at 07:16

## 2021-04-30 RX ADMIN — Medication 3 MG: at 08:32

## 2021-04-30 RX ADMIN — FENTANYL CITRATE 50 MCG: 50 INJECTION, SOLUTION INTRAMUSCULAR; INTRAVENOUS at 09:00

## 2021-04-30 RX ADMIN — HYDROCODONE BITARTRATE AND ACETAMINOPHEN 1 TABLET: 5; 325 TABLET ORAL at 10:05

## 2021-04-30 RX ADMIN — LABETALOL HYDROCHLORIDE 5 MG: 5 INJECTION INTRAVENOUS at 08:14

## 2021-04-30 RX ADMIN — LIDOCAINE HYDROCHLORIDE 100 MG: 20 INJECTION, SOLUTION EPIDURAL; INFILTRATION; INTRACAUDAL; PERINEURAL at 07:16

## 2021-04-30 RX ADMIN — VANCOMYCIN HYDROCHLORIDE 1500 MG: 500 INJECTION, POWDER, LYOPHILIZED, FOR SOLUTION INTRAVENOUS at 07:07

## 2021-04-30 RX ADMIN — FENTANYL CITRATE 50 MCG: 50 INJECTION, SOLUTION INTRAMUSCULAR; INTRAVENOUS at 07:12

## 2021-04-30 RX ADMIN — Medication 0.5 MG: at 08:31

## 2021-04-30 RX ADMIN — ROCURONIUM BROMIDE 40 MG: 10 INJECTION, SOLUTION INTRAVENOUS at 07:16

## 2021-04-30 RX ADMIN — SODIUM CHLORIDE: 9 INJECTION, SOLUTION INTRAVENOUS at 07:07

## 2021-04-30 RX ADMIN — FENTANYL CITRATE 50 MCG: 50 INJECTION, SOLUTION INTRAMUSCULAR; INTRAVENOUS at 08:29

## 2021-04-30 RX ADMIN — ONDANSETRON 4 MG: 2 INJECTION INTRAMUSCULAR; INTRAVENOUS at 08:16

## 2021-04-30 RX ADMIN — LABETALOL HYDROCHLORIDE 5 MG: 5 INJECTION INTRAVENOUS at 08:20

## 2021-04-30 ASSESSMENT — PULMONARY FUNCTION TESTS
PIF_VALUE: 26
PIF_VALUE: 15
PIF_VALUE: 0
PIF_VALUE: 23
PIF_VALUE: 2
PIF_VALUE: 28
PIF_VALUE: 15
PIF_VALUE: 0
PIF_VALUE: 29
PIF_VALUE: 24
PIF_VALUE: 8
PIF_VALUE: 29
PIF_VALUE: 32
PIF_VALUE: 27
PIF_VALUE: 30
PIF_VALUE: 28
PIF_VALUE: 4
PIF_VALUE: 9
PIF_VALUE: 28
PIF_VALUE: 27
PIF_VALUE: 28
PIF_VALUE: 29
PIF_VALUE: 23
PIF_VALUE: 15
PIF_VALUE: 31
PIF_VALUE: 28
PIF_VALUE: 29
PIF_VALUE: 24
PIF_VALUE: 28
PIF_VALUE: 29
PIF_VALUE: 28
PIF_VALUE: 32
PIF_VALUE: 28
PIF_VALUE: 1
PIF_VALUE: 6
PIF_VALUE: 1
PIF_VALUE: 28
PIF_VALUE: 28
PIF_VALUE: 18
PIF_VALUE: 24
PIF_VALUE: 28
PIF_VALUE: 30
PIF_VALUE: 5
PIF_VALUE: 5
PIF_VALUE: 15
PIF_VALUE: 28
PIF_VALUE: 29
PIF_VALUE: 5
PIF_VALUE: 28
PIF_VALUE: 29
PIF_VALUE: 8
PIF_VALUE: 31
PIF_VALUE: 28
PIF_VALUE: 33
PIF_VALUE: 24

## 2021-04-30 ASSESSMENT — PAIN SCALES - GENERAL: PAINLEVEL_OUTOF10: 6

## 2021-04-30 NOTE — ANESTHESIA PRE PROCEDURE
Department of Anesthesiology  Preprocedure Note       Name:  Sylvie Pitts   Age:  62 y.o.  :  1960                                          MRN:  [id number]         Date:  2021      Surgeon: Rebeca Canales):  Giulia Nash DPM    Procedure: Procedure(s):  REPAIR ANTERIOR TALAR FIBULAR LIGAMENT RIGHT FOOT REPAIR PERONEAL TENDON RIGHT FOOT POSSIBLE SURAL NERVE LYSIS   ++ARTHREX++     ++LATEX ALLERGY++    Medications prior to admission:   Prior to Admission medications    Not on File       Current medications:    No current facility-administered medications for this encounter. Allergies:     Allergies   Allergen Reactions    Latex      Leaves marks on skin    Aspirin Hives and Shortness Of Breath     sick  Other reaction(s): Disease type AND/OR category unknown (finding), GI Upset    Codeine Hives and Shortness Of Breath     fatique  Other reaction(s): Disease type AND/OR category unknown (finding)    Penicillins Hives and Shortness Of Breath     swelling  Other reaction(s): Disease type AND/OR category unknown (finding), GI Upset  headaches      Soap Hives and Shortness Of Breath    Bee Venom      Other reaction(s): Disease type AND/OR category unknown (finding)    Sulfa Antibiotics      Family history        Problem List:    Patient Active Problem List   Diagnosis Code    Hypothyroidism E03.9    Abnormal EKG R94.31       Past Medical History:        Diagnosis Date    Asthma     Right foot pain        Past Surgical History:        Procedure Laterality Date     SECTION      FOOT SURGERY Right     Tendon repair    HYSTERECTOMY      KNEE SURGERY Bilateral     MENISCECTOMY Right        Social History:    Social History     Tobacco Use    Smoking status: Former Smoker     Types: Cigarettes     Quit date: 1996     Years since quittin.0    Smokeless tobacco: Never Used   Substance Use Topics    Alcohol use: Not Currently                                Counseling given: Not Answered      Vital Signs (Current):   Vitals:    04/27/21 1546 04/30/21 0608   BP:  (!) 162/81   Pulse:  62   Resp:  20   Temp:  96.8 °F (36 °C)   SpO2:  98%   Weight: 190 lb (86.2 kg) 190 lb (86.2 kg)   Height: 5' 4\" (1.626 m) 5' 4\" (1.626 m)                                              BP Readings from Last 3 Encounters:   04/30/21 (!) 162/81   04/19/21 (!) 145/84   04/15/21 126/82       NPO Status: Time of last liquid consumption: 2120                        Time of last solid consumption: 2120                        Date of last liquid consumption: 04/29/21                        Date of last solid food consumption: 04/29/21    BMI:   Wt Readings from Last 3 Encounters:   04/30/21 190 lb (86.2 kg)   04/19/21 191 lb 3.2 oz (86.7 kg)   04/15/21 191 lb (86.6 kg)     Body mass index is 32.61 kg/m². CBC:   Lab Results   Component Value Date    WBC 4.8 04/15/2021    RBC 4.90 04/15/2021    HGB 13.9 04/15/2021    HCT 44.0 04/15/2021    MCV 89.8 04/15/2021    RDW 12.9 04/15/2021     04/15/2021       CMP:   Lab Results   Component Value Date     04/15/2021    K 4.6 04/15/2021     04/15/2021    CO2 25 04/15/2021    BUN 17 04/15/2021    CREATININE 0.8 04/15/2021    GFRAA >60 04/15/2021    LABGLOM >60 04/15/2021    GLUCOSE 122 04/15/2021    PROT 7.4 04/15/2021    CALCIUM 9.9 04/15/2021    BILITOT 0.3 04/15/2021    ALKPHOS 76 04/15/2021    AST 16 04/15/2021    ALT 19 04/15/2021       POC Tests: No results for input(s): POCGLU, POCNA, POCK, POCCL, POCBUN, POCHEMO, POCHCT in the last 72 hours.     Coags: No results found for: PROTIME, INR, APTT    HCG (If Applicable): No results found for: PREGTESTUR, PREGSERUM, HCG, HCGQUANT     ABGs: No results found for: PHART, PO2ART, OCK1UAJ, DAF5PGQ, BEART, L3RCSULZ     Type & Screen (If Applicable):  No results found for: LABABO, LABRH    Drug/Infectious Status (If Applicable):  No results found for: HIV, HEPCAB    COVID-19 Screening (If Applicable):   Lab Results Component Value Date    COVID19 Not Detected 04/26/2021           Anesthesia Evaluation  Patient summary reviewed no history of anesthetic complications:   Airway: Mallampati: III  TM distance: >3 FB   Neck ROM: full  Mouth opening: > = 3 FB Dental: normal exam         Pulmonary: breath sounds clear to auscultation  (+) asthma:                            Cardiovascular:Negative CV ROS            Rhythm: regular             Beta Blocker:  Not on Beta Blocker         Neuro/Psych:   Negative Neuro/Psych ROS              GI/Hepatic/Renal: Neg GI/Hepatic/Renal ROS            Endo/Other:    (+) hypothyroidism (off medications per personal choice)::., .                 Abdominal:           Vascular: negative vascular ROS. Anesthesia Plan      general     ASA 2       Induction: intravenous. MIPS: Postoperative opioids intended and Prophylactic antiemetics administered. Anesthetic plan and risks discussed with patient. Plan discussed with CRNA. Debra Lambert DO   4/30/2021     DOS STAFF ADDENDUM:    Pt seen and examined, physical exam updated, chart reviewed including anesthesia, drug and allergy history. H&P reviewed. No interval changes to history or physical examination (unless noted above). NPO status confirmed. Anesthetic plan, risks, benefits, alternatives discussed with patient. Patient verbalized an understanding and agrees to proceed.      Debra Lambert DO  Staff Anesthesiologist  6:50 AM

## 2021-04-30 NOTE — BRIEF OP NOTE
Brief Postoperative Note      Patient: Romana Begin  YOB: 1960  MRN: [id number]    Date of Procedure: 4/30/2021    Pre-Op Diagnosis: RUPTURED ANTERIOR TALAR FIBULAR LIGAMENT PERONEAL TENDON RUPTURE SURAL Neuritis    Post-Op Diagnosis: Same       Procedure(s):  REPAIR ANTERIOR TALAR FIBULAR LIGAMENT RIGHT FOOT REPAIR PERONEAL TENDON RIGHT FOOT, SURAL NERVE LYSIS  W PRP    Surgeon(s):  Sam Naranjo DPM    Assistant:  Resident: Miguel Zimmerman    Anesthesia: General    Estimated Blood Loss (mL): 5cc     Complications: None    Specimens:   ID Type Source Tests Collected by Time Destination   A : RIGHT ANKLE OSSICLE Bone Bone SURGICAL PATHOLOGY Sam Naranjo DPM 4/30/2021 0739    B : SURAL NERVE Tissue Tissue SURGICAL PATHOLOGY Sam Naranjo DPM 4/30/2021 7192        Implants:  Implant Name Type Inv. Item Serial No.  Lot No. LRB No. Used Action   DEVICE GRFT FIX FOR LIGMNT [facility] REP PEEK INT BRAC  DEVICE GRFT FIX FOR LIGMNT [facility] REP PEEK INT BRAC  [address] [facility]- [id number] Right 1 Implanted   GRAFT HUM TISS [id number] THK1MM ACELLULAR ARTHROFLEX - [id number]  GRAFT HUM TISS [id number] THK1MM ACELLULAR ARTHROFLEX [id number] [facility] TISSUE BANK-  Right 1 Implanted         Drains: * No LDAs found *    Findings:   Ankle stabilization performed utilizing Arthrex internal brace. Sural neurectomy to the right side was also performed with transection as well as burying into the kager's fat pad. Peroneal longus as well as brevis were identified and freed from adhesions within the peroneal retinaculum and a graft jacket was applied of the peroneal longus. Injection of PRP was then performed to both major surgical sites.     Electronically signed by Miguel Zimmerman on 4/30/2021 at 9:02 AM

## 2021-04-30 NOTE — ANESTHESIA POSTPROCEDURE EVALUATION
Department of Anesthesiology  Postprocedure Note    Patient: Ajay Pichardo  MRN: [id number]  YOB: 1960  Date of evaluation: 4/30/2021  Time:  3:36 PM     Procedure Summary     Date: 04/30/21 Room / Location: [facility] OR 07 / [facility]    Anesthesia Start: 5228 Anesthesia Stop: 2319    Procedure: REPAIR ANTERIOR TALAR FIBULAR LIGAMENT RIGHT FOOT REPAIR PERONEAL TENDON RIGHT FOOT, SURAL NERVE LYSIS  W PRP (Right Ankle) Diagnosis: (RUPTURED ANTERIOR TALAR FIBULAR LIGAMENT PERONEAL TENDON RUPTURE SURAL NERVE)    Surgeons: Riley Abel DPM Responsible Provider: Naveed Zheng DO    Anesthesia Type: general ASA Status: 2          Anesthesia Type: general    Jasen Phase I: Jasen Score: 10    Jasen Phase II: Jasen Score: 10    Last vitals: Reviewed and per EMR flowsheets.        Anesthesia Post Evaluation    Patient location during evaluation: PACU  Patient participation: complete - patient participated  Level of consciousness: awake and alert  Airway patency: patent  Nausea & Vomiting: no nausea and no vomiting  Complications: no  Cardiovascular status: hemodynamically stable  Respiratory status: acceptable  Hydration status: euvolemic

## 2021-04-30 NOTE — OP NOTE
Operative Note      Patient: Bree Alfred  YOB: 1960  MRN: [id number]    Date of Procedure: 4/30/2021    Pre-Op Diagnosis: RUPTURED ANTERIOR TALAR FIBULAR LIGAMENT PERONEAL TENDON RUPTURE SURAL neuritis    Post-Op Diagnosis: Same       Procedure(s):  REPAIR ANTERIOR TALAR FIBULAR LIGAMENT RIGHT FOOT REPAIR PERONEAL TENDON RIGHT FOOT, SURAL NERVE LYSIS  W PRP    Surgeon(s):  Alexsandra Hooper DPM    Assistant:   Resident: Keith Rosado    Anesthesia: General    Estimated Blood Loss (mL): 5cc  Complications: None    Specimens:   ID Type Source Tests Collected by Time Destination   A : RIGHT ANKLE OSSICLE Bone Bone SURGICAL PATHOLOGY Alexsandra Hooper DPM 4/30/2021 0739    B : SURAL NERVE Tissue Tissue SURGICAL PATHOLOGY Alexsandra Hooper DPM 4/30/2021 7556        Implants:  Implant Name Type Inv. Item Serial No.  Lot No. LRB No. Used Action   DEVICE GRFT FIX FOR LIGMNT [facility] REP PEEK INT BRAC  DEVICE GRFT FIX FOR LIGMNT [facility] REP PEEK INT BRAC  [employer] [id number] Right 1 Implanted   GRAFT HUM TISS [id number] THK1MM ACELLULAR ARTHROFLEX - [id number]  GRAFT HUM TISS [id number] THK1MM ACELLULAR ARTHROFLEX [id number] [facility] TISSUE BANK-  Right 1 Implanted   Injectables: 20 cc of 0.5% plain Marcaine  Materials: 2-0 Vicryl, 3-0 Vicryl, 3-0 nylon. Jumpstart dressing      Drains: * No LDAs found *    Detailed Description of Procedure:   Patient is a pleasant 23-year-old female well-known to our practice with complaints of chronic and worsening pain to the right ankle as well as noted instability both clinically as well as objectively.   The patient has undergone previous procedures to the right ankle by an additional surgeon (suspect superficial peroneal nerve release as well as prior peroneal tendon surgery.)  The patient has failed conservative treatment including but not limited to shoe gear modification, activity modification, NSAIDs, etc.  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 Patient is brought to the OR and transition to the operative table in supine position where general anesthesia was administered. A well-padded thigh tourniquet was applied to the right thigh. The right lower extremity was prepped and draped in normal sterile fashion lowered to the operative field. The right thigh tourniquet was inflated following exsanguination of the extremity to 350 mmHg. Attention was directed to the lateral aspect of the right ankle where an approximately 5 cm long incision was utilized with a 15 blade down through level skin and subcutaneous tissue. Additional lateral ankle dissection was carried down to the level of the joint where the anterior lateral component of the talar body was identified as well as the anterior component of the distal fibula. There was noted to be 2 free floating ossicles present to the distal anterior aspect of the fibula which likely suggest prior avulsion. There did not appear acutely avulsed from the distal fibula at this time they did appear to be chronically present, and likely contributing to the instability present to the anterior lateral ankle. These pieces were then sent to pathology. Utilizing traditional system fixation 2 small anchors were inserted into the fibula and the needles were utilized to grasp the inferior extensor retinaculum and approximate them back to their origin site on the fibula. Additionally a large suture anchor was inserted into the anterior lateral body of the talus and utilized to supplement the approximation of the inferior extensor retinaculum tissue, this was then additionally attached to the anterior distal fibula.   It was noted that there is considerable attenuation of the anterolateral ankle ligaments and there is definitely laxity noted previous to the repair. The incision was then closed in layered fashion with Vicryl deep and nylon superficially. Attention was then directed approximately 3 cm distally where an additional 5 cm long curvilinear incision was made overlying the peroneal tendon course utilizing 15 blade. Additional dissection was carried down utilizing a Metzenbaum scissors to the level subcutaneous tissue until the sural nerve was identified. There was definitely noted adhesions within the soft tissue overlying the nerve. The decision interoperatively was made to transect the nerve and buried the proximal component within Kager's fat pad. A section of the transected nerve was excised and additionally sent to pathology. The peroneal tendon sheath was then opened utilizing a Metzenbaum scissors. It was noted that there was considerable adhesion to both the peroneal brevis tendon distally as well as the peroneal longus tendon proximally in the region distal to the fibula. There is also noted to be mild irregularities of the proximal component of the peroneal longus that we felt warranted address meant with a graft jacket. The graft jacket was sutured to the peroneal longus tendon and the proximal component of it with Vicryl suture. The peroneal retinaculum was then repaired and the sheath was approximated utilizing Vicryl. Layered closure was then performed utilizing Vicryl deep and nylon superficially. Hold blood present obtained from the patient's IV and spun down in order to utilize PRP for injection approximately 5 cc of PRP was injected to both sites deep to aid in the healing process. Additionally 20 cc of 0.5% plain Marcaine was injected proximally to the incision sites for postoperative anesthesia purposes. The tourniquet was dropped for a total time of 81 minutes and the incision was dressed with jumpstart dressing, 4 x 4, Kerlix, stockinette, web roll, posterior splint, Ace.   The ankle was held in 90 degrees until the posterior splint was hardened. Patient is instructed remain nonweightbearing to the operative extremity at this time. She is instructed take her pain medication as instructed. She is to follow-up with her surgeon in 1 week's time. She tolerated procedure well. She is to contact the office if any complications arise during the postoperative period.     Electronically signed by Jd Escobedo on 4/30/2021 at 9:05 AM

## 2021-05-04 ENCOUNTER — OFFICE VISIT (OUTPATIENT)
Dept: PODIATRY | Age: 61
End: 2021-05-04

## 2021-05-04 VITALS — TEMPERATURE: 97.8 F

## 2021-05-04 DIAGNOSIS — G89.18 PAIN FOLLOWING SURGERY OR PROCEDURE: Primary | ICD-10-CM

## 2021-05-04 DIAGNOSIS — S93.401D RUPTURE OF LIGAMENT OF ANKLE, RIGHT, SUBSEQUENT ENCOUNTER: ICD-10-CM

## 2021-05-04 DIAGNOSIS — Z09 POSTOPERATIVE EXAMINATION: ICD-10-CM

## 2021-05-04 PROCEDURE — 99024 POSTOP FOLLOW-UP VISIT: CPT | Performed by: PODIATRIST

## 2021-05-14 ENCOUNTER — OFFICE VISIT (OUTPATIENT)
Dept: PODIATRY | Age: 61
End: 2021-05-14

## 2021-05-14 VITALS — TEMPERATURE: 97.1 F

## 2021-05-14 DIAGNOSIS — Z09 POSTOPERATIVE EXAMINATION: Primary | ICD-10-CM

## 2021-05-14 PROCEDURE — 99024 POSTOP FOLLOW-UP VISIT: CPT | Performed by: PODIATRIST

## 2021-05-14 NOTE — PROGRESS NOTES
Postop Progress Note    Subjective    Sylvie Pitts presents to the office 2 weeks  following ankle sx. Eating a regular diet without difficulty. Objective    Vitals:    05/14/21 1635   Temp: 97.1 °F (36.2 °C)     General: alert, cooperative and no distress  Incision: healing well, no drainage, no erythema, no hernia, no seroma, no swelling, well approximated    Assessment    Doing well postoperatively. Plan   1. Continue any current medications  2. Wound care discussed  3. Wound/Incision: healing well, no drainage, no erythema, no hernia, no seroma, no swelling, well approximated, sutures removed  4. Disposition:   No heavy lifting. Should not return to gym class or sports until cleared by a physician. 5. Diet: regular diet  6. Follow up: 1 week. Patient Name: Sylvie Pitts     Suture/ Staple Removal Procedure Note  Indication: Wound healed incision is healed there is no signs of erythematous wound dehiscence or drainage    Procedure: The patient was placed in the appropriate position the wound was aseptically prepped with chlora prep   and the sutures were removed without difficulty. The site was  dressed  the area has healed with no complications      The patient tolerated the procedure well. Complications: None    Cam Walker nonweightbearing 2 more weeks. Patient may get foot wet tomorrow  Signed informed consent was obtained from the patient. Patient applied the cam walker to the affected limb. This device fit well. Patient was given written and verbal instructions regarding this cam walker. This is medically necessary to help reduce pain and promote and expedite healing.         Electronically signed by Giulia Nash DPM on 5/14/2021 at 4:55 PM

## 2021-05-21 ENCOUNTER — OFFICE VISIT (OUTPATIENT)
Dept: PODIATRY | Age: 61
End: 2021-05-21

## 2021-05-21 VITALS — RESPIRATION RATE: 16 BRPM | TEMPERATURE: 97.8 F

## 2021-05-21 DIAGNOSIS — Z09 POSTOPERATIVE EXAMINATION: Primary | ICD-10-CM

## 2021-05-21 PROCEDURE — 99024 POSTOP FOLLOW-UP VISIT: CPT | Performed by: PODIATRIST

## 2021-06-02 ENCOUNTER — OFFICE VISIT (OUTPATIENT)
Dept: PODIATRY | Age: 61
End: 2021-06-02

## 2021-06-02 VITALS — DIASTOLIC BLOOD PRESSURE: 74 MMHG | TEMPERATURE: 97.7 F | SYSTOLIC BLOOD PRESSURE: 133 MMHG

## 2021-06-02 DIAGNOSIS — Z09 POSTOPERATIVE EXAMINATION: Primary | ICD-10-CM

## 2021-06-02 PROCEDURE — 99024 POSTOP FOLLOW-UP VISIT: CPT | Performed by: PODIATRIST

## 2021-06-02 NOTE — PROGRESS NOTES
Postop Progress Note    Subjective pt seen for right ankle sx      Xavi Fisher presents to the office 5 weeks  following surgery . Eating a regular diet without difficulty. Objective    Vitals:    06/02/21 0703   BP: 133/74   Temp: 97.7 °F (36.5 °C)     General: alert, cooperative and no distress  Incision: healing well, no drainage, no erythema, no hernia, no seroma, no swelling, well approximated    Assessment    Encounter Diagnosis   Name Primary?  Postoperative examination Yes       Doing well postoperatively. Plan   1. Continue any current medications  2. Wound care discussed  3. Wound/Incision: healing well, no drainage, no erythema, no hernia, no seroma, no swelling, well approximated  4. Disposition:   Limited activities. No heavy lifting. No strenuous exercise. Should not return to gym class or sports until cleared by a physician. 5. Diet: regular diet  6. Follow up: 2 weeks.            Electronically signed by Ramon Najera DPM on 6/2/2021 at 7:14 AM

## 2021-06-21 ENCOUNTER — OFFICE VISIT (OUTPATIENT)
Dept: PODIATRY | Age: 61
End: 2021-06-21

## 2021-06-21 VITALS — TEMPERATURE: 97.9 F | DIASTOLIC BLOOD PRESSURE: 70 MMHG | SYSTOLIC BLOOD PRESSURE: 118 MMHG

## 2021-06-21 DIAGNOSIS — Z09 POSTOPERATIVE EXAMINATION: ICD-10-CM

## 2021-06-21 DIAGNOSIS — S86.311D PERONEAL TENDON RUPTURE, RIGHT, SUBSEQUENT ENCOUNTER: ICD-10-CM

## 2021-06-21 DIAGNOSIS — M25.571 CHRONIC PAIN OF RIGHT ANKLE: ICD-10-CM

## 2021-06-21 DIAGNOSIS — S93.401D RUPTURE OF LIGAMENT OF ANKLE, RIGHT, SUBSEQUENT ENCOUNTER: Primary | ICD-10-CM

## 2021-06-21 DIAGNOSIS — G89.29 CHRONIC PAIN OF RIGHT ANKLE: ICD-10-CM

## 2021-06-21 DIAGNOSIS — G89.18 PAIN FOLLOWING SURGERY OR PROCEDURE: ICD-10-CM

## 2021-06-21 PROCEDURE — 99024 POSTOP FOLLOW-UP VISIT: CPT | Performed by: PODIATRIST

## 2021-06-21 NOTE — PROGRESS NOTES
Postop Progress Note    Subjective    Deb Morrison presents to the office 8 weeks  following ankle surgery  . Eating a regular diet without difficulty. Objective    Vitals:    06/21/21 0744   BP: 118/70   Temp: 97.9 °F (36.6 °C)     General: alert, cooperative and no distress  Incision: healing well    Assessment    Doing well postoperatively. Plan   1. Continue any current medications  2. Wound care discussed  3. Wound/Incision: healing well  4. Disposition:   Pt is to increase activities as tolerated. May return to work on 7- .  5. Diet: regular diet  6. Follow up: 3 weeks. pt to start physical therapy   D/c cam walker   Ankle Stirrup Brace Dispensing  Signed informed consent was obtained from the patient. Brace was fitted and applied. Patient was counseled. Brace is medically necessary to promote and expedite healing ad reduce pain and swelling.         Electronically signed by Kirti De Jesus DPM on 6/21/2021 at 8:00 AM done

## 2021-06-24 ENCOUNTER — EVALUATION (OUTPATIENT)
Dept: PHYSICAL THERAPY | Age: 61
End: 2021-06-24
Payer: COMMERCIAL

## 2021-06-24 DIAGNOSIS — G89.18 PAIN FOLLOWING SURGERY OR PROCEDURE: ICD-10-CM

## 2021-06-24 DIAGNOSIS — M25.571 CHRONIC PAIN OF RIGHT ANKLE: ICD-10-CM

## 2021-06-24 DIAGNOSIS — S93.401D RUPTURE OF LIGAMENT OF ANKLE, RIGHT, SUBSEQUENT ENCOUNTER: ICD-10-CM

## 2021-06-24 DIAGNOSIS — S86.311D PERONEAL TENDON RUPTURE, RIGHT, SUBSEQUENT ENCOUNTER: Primary | ICD-10-CM

## 2021-06-24 DIAGNOSIS — G89.29 CHRONIC PAIN OF RIGHT ANKLE: ICD-10-CM

## 2021-06-24 PROCEDURE — 97110 THERAPEUTIC EXERCISES: CPT | Performed by: PHYSICAL THERAPIST

## 2021-06-24 PROCEDURE — 97161 PT EVAL LOW COMPLEX 20 MIN: CPT | Performed by: PHYSICAL THERAPIST

## 2021-06-24 NOTE — PROGRESS NOTES
[facility]   Phone: [phone]             Fax: [phone]      Physical Therapy Daily Treatment Note    Date: 2021  Patient Name: Sylvie Pitts  : 1960   MRN: [id number]  DOInjury:    DOSx: 2021   Referring Provider: Giulia Nash DPM  [address], [address] [facility] Diagnosis:   A66 (ICD-10-CM) - Postoperative examination   G89.18 (ICD-10-CM) - Pain following surgery or procedure   S93.401D (ICD-10-CM) - Rupture of ligament of ankle, right, subsequent encounter   S86.311D (ICD-10-CM) - Peroneal tendon rupture, right, subsequent encounter   M25.571, G89.29 (ICD-10-CM) - Chronic pain of right ankle     Outcome Measure:  LEFS 42/80    S: See eval.   O:   Time 1015- 1054     Visit  Repeat outcome measure at mid point and end. Pain 6/10     ROM Right:   AROM: 10° Dorsiflexion,  40° Plantarflexion, 10° Inversion, 12° Eversion      Modalities            Manual            Stretch                  Exercise      Bike      Ankle 4 way motion      Ankle T band      Ankle bosu motion      Ankle alphabet 1 x  HEP TE   SLS      Ankle circles   x 10  CW, CCW ; HEP TE   TG squats      TG calf raises      Step-ups - FWD      Step-ups - LAT      Step-ups - BWD        NMR To improve balance for safe community and home ambulation    Resisted walk      FWD      BKWD      lat      March      Side stepping      Retro walk      Heel to toe      A:  Tolerated well. Above added to written HEP.   P: Continue with rehab plan  Ramon Chinchilla, PT DPT, PT [id number]    Treatment Charges: Mins Units   Initial Evaluation 31 1   Re-Evaluation     Ther Exercise         TE 8 1   Manual Therapy     MT     Ther Activities        TA     Gait Training          GT     Neuro Re-education NR     Modalities     Non-Billable Service Time     Other     Total Time/Units 39 2

## 2021-06-24 NOTE — PROGRESS NOTES
[address] [facility]   Phone: [phone]   Fax: [phone]         Date:  2021   Patient: Derick Long  : 1960  MRN: [id number]  Referring Provider: Kellen Petersen DPM  [address], [address] [facility] Diagnosis:   B66 (ICD-10-CM) - Postoperative examination   G89.18 (ICD-10-CM) - Pain following surgery or procedure   S93.401D (ICD-10-CM) - Rupture of ligament of ankle, right, subsequent encounter   S86.311D (ICD-10-CM) - Peroneal tendon rupture, right, subsequent encounter   M25.571, G89.29 (ICD-10-CM) - Chronic pain of right ankle       SUBJECTIVE:     Onset date: years     Onset: Unknown    Mechanism of Injury: Pt reports hurt ankle in . Dr. Artemio Del Valle did surgery on ankle at that time. Pt reports got a second opinion with Dr. Erinn Luu in March of this year and had surgery . Pt reports initially after surgery was NWB at least 2 weeks. Pt reports was given aircast to wear not but not sure if she has to wear it all the time and is allowed to weight bear now. Recently switched to aircast from walking boot. Previous PT: yes     Medical Management for Current Problem: none    Chief complaint: tightness    Behavior: condition is getting better    Pain:   Current: 6/10     Best: 10     Worst:/10    Symptom Type/Quality: sharp, tingling  Location[de-identified] Ankle: lateral side  Into bottom of heel     Aggravated by: up moving too much    Relieved by: reduced weightbearing, and elevate with ice     Imaging results: No results found.     Past Medical History  Past Medical History:   Diagnosis Date    Asthma     Right foot pain      Past Surgical History:   Procedure Laterality Date    ANKLE SURGERY Right 2021    REPAIR ANTERIOR TALAR FIBULAR LIGAMENT RIGHT FOOT REPAIR PERONEAL TENDON RIGHT FOOT, SURAL NERVE LYSIS  W PRP performed by Kellen Petersen DPM at [address] [place] [address] Right     Tendon repair   Dee Mahmood or recovery include. Domestic Concerns:  [x] No  [] Yes:    Short Term goals (3 weeks)   Decrease reported pain to 0-6/10   Increase ROM to AROM: 15° Dorsiflexion,  45° Plantarflexion, 20° Inversion, 20° Eversion    Increase Strength to 4+/5    Able to perform/complete the following functions/tasks: pt able to perform 10 sit to stands with 1 UE support with minor pain/limitation. Pt able to go up/down 5 steps with 1 rail with minor pain/limitation. Pt able to walk 20 minutes with minor pain/limitation.  Lower Extremity Functional Scale (LEFS) 50/80 impairment    Long Term goals (6 weeks)   Decrease reported pain to 0-3/10   Increase ROM to AROM: 20° Dorsiflexion,  50° Plantarflexion, 35° Inversion, 25° Eversion   Increase strength to 5/5   Able to complete the following functions/tasks: pt able to perform 10 sit to stands without UE support with no pain/limitation. Pt able to go up/down flight of steps with no pain/limitation. Pt able to walk 30+ minutes with no pain/limitation.  LEFS 60/80 impairment   Independent with home exercise program (HEP)    Rehab Potential: [x] Good  [] Fair  [] Poor    PLAN       Treatment Plan:   [x] Therapeutic Exercise  [x] Therapeutic Activity  [x] Neuromuscular Re-education   [x] Gait Training  [x] Balance Training  [x] Aerobic conditioning  [x] Manual Therapy  [x] Massage/Fascial release   [] Work/Sport specific activities    [] Pain Neuroscience [x] Cold/hotpack  [] Vasocompression  [x] Electrical Stimulation  [] Lumbar/Cervical Traction  [] Ultrasound   [] Iontophoresis: 4 mg/mL Dexamethasone Sodium Phosphate 40-80 mAmin        [x] Instruction in HEP      []  Medication allergies reviewed for use of Dexamethasone Sodium Phosphate 4mg/ml  with iontophoresis treatments. Patient is not allergic.       The following CPT codes are likely to be used in the care of this patient: 455 1011 PT Evaluation: Low Complexity , 32138 PT Re-Evaluation , 26433 Therapeutic Exercise , [id number] Neuromuscular Re-Education , 35963 Therapeutic Activities , 77165 Manual Therapy , 48585 Gait Training ,  Electrical Stimulation      Suggested Professional Referral: [x] No  [] Yes:     Patient Education:  [x] Plans/Goals, Risks/Benefits discussed  [x] Home exercise program  Method of Education: [x] Verbal  [x] Demo  [x] Written  Comprehension of Education:  [x] Verbalizes understanding. [x] Demonstrates understanding. [] Needs Review. [] Demonstrates/verbalizes understanding of HEP/Ed previously given. Frequency: 2 days per week for 6 weeks    Patient understands diagnosis/prognosis and consents to treatment, plan and goals: [x] Yes    [] No     Thank you for the opportunity to work with your patient. If you have questions or comments, please contact me at numbers listed above. Electronically signed by: Alla Ibrahim, PT DPT, PT [id number]         Please sign Physician's Certification and return to: [address] [place] [address]  Dept: [phone]  Dept Fax: [phone] Certification / Comments     Frequency/Duration 2 days per week for 6 weeks. Certification period from 6/24/2021  to 8/6/2021. I have reviewed the Plan of Care established for skilled therapy services and certify that the services are required and that they will be provided while the patient is under my care.     Physician's Comments/Revisions:               Physician's Printed Name:                                           [de-identified] Signature:                                                               Date:

## 2021-06-28 ENCOUNTER — TREATMENT (OUTPATIENT)
Dept: PHYSICAL THERAPY | Age: 61
End: 2021-06-28
Payer: COMMERCIAL

## 2021-06-28 DIAGNOSIS — S86.311D PERONEAL TENDON RUPTURE, RIGHT, SUBSEQUENT ENCOUNTER: Primary | ICD-10-CM

## 2021-06-28 DIAGNOSIS — S93.401D RUPTURE OF LIGAMENT OF ANKLE, RIGHT, SUBSEQUENT ENCOUNTER: ICD-10-CM

## 2021-06-28 DIAGNOSIS — G89.18 PAIN FOLLOWING SURGERY OR PROCEDURE: ICD-10-CM

## 2021-06-28 DIAGNOSIS — G89.29 CHRONIC PAIN OF RIGHT ANKLE: ICD-10-CM

## 2021-06-28 DIAGNOSIS — M25.571 CHRONIC PAIN OF RIGHT ANKLE: ICD-10-CM

## 2021-06-28 PROCEDURE — 97112 NEUROMUSCULAR REEDUCATION: CPT | Performed by: PHYSICAL THERAPIST

## 2021-06-28 PROCEDURE — 97110 THERAPEUTIC EXERCISES: CPT | Performed by: PHYSICAL THERAPIST

## 2021-06-28 NOTE — PROGRESS NOTES
[address] [facility]   Phone: [phone]             Fax: [phone]      Physical Therapy Daily Treatment Note    Date: 2021  Patient Name: Billie Solorzano  : 1960   MRN: [id number]  DOInjury: 2016   DOSx: 2021   Referring Provider: No referring provider defined for this encounter. Medical Diagnosis:   Z09 (ICD-10-CM) - Postoperative examination   G89.18 (ICD-10-CM) - Pain following surgery or procedure   S93.401D (ICD-10-CM) - Rupture of ligament of ankle, right, subsequent encounter   S86.311D (ICD-10-CM) - Peroneal tendon rupture, right, subsequent encounter   M25.571, G89.29 (ICD-10-CM) - Chronic pain of right ankle     Outcome Measure:  LEFS 42/80    S: Pt reports wore her brace without sock on and now has skin breakdown. Wearing a bandaid on area. Contacted Dr. Bernarda Finch nurse, Monik Linda. Monik Linda in to assess. Per Monik Linda, pt to use neosporin and bandaid to cover and to not use brace until skin healed. Pt reports compliant with HEP at home. O:   Time [id number]      Visit  Repeat outcome measure at mid point and end. Pain See above      ROM Right:   AROM: 10° Dorsiflexion,  40° Plantarflexion, 10° Inversion, 12° Eversion      Modalities            Manual            Stretch                  Exercise      Bike 7 minutes   te   Ankle 4 way motion      Ankle T band      Ankle saucer  motion  20x  PF/DF, EV/INV, CW, CCW te   Ankle alphabet 1 x  HEP TE   SLS     Ankle circles  CW, CCW ; HEP TE   Toe raises seated 2 x 10   te    calf raises seated  2 x 10   te   Step-ups - FWD X 10 B  6 inch step     Step-ups - LAT      Step-ups - BWD        NMR To improve balance for safe community and home ambulation    Resisted walk      FWD      BKWD      lat 1 x 4 each  Cable system 40# = 10# NMR   March      Side stepping      Retro walk      Heel to toe      A:  Tolerated well. Pt reports more knee discomfort than ankle end of session.   Reports h/o knee problems and surgeries.     P: Continue with rehab plan  Dominick Dupont, PT DPT, PT [id number]    Treatment Charges: Mins Units   Initial Evaluation     Re-Evaluation     Ther Exercise         TE 34 2   Manual Therapy     MT     Ther Activities        TA     Gait Training          GT     Neuro Re-education NR 10 1   Modalities     Non-Billable Service Time     Other     Total Time/Units 44 3

## 2021-07-06 ENCOUNTER — TREATMENT (OUTPATIENT)
Dept: PHYSICAL THERAPY | Age: 61
End: 2021-07-06
Payer: COMMERCIAL

## 2021-07-06 DIAGNOSIS — G89.29 CHRONIC PAIN OF RIGHT ANKLE: ICD-10-CM

## 2021-07-06 DIAGNOSIS — M25.571 CHRONIC PAIN OF RIGHT ANKLE: ICD-10-CM

## 2021-07-06 DIAGNOSIS — S93.401D RUPTURE OF LIGAMENT OF ANKLE, RIGHT, SUBSEQUENT ENCOUNTER: ICD-10-CM

## 2021-07-06 DIAGNOSIS — S86.311D PERONEAL TENDON RUPTURE, RIGHT, SUBSEQUENT ENCOUNTER: Primary | ICD-10-CM

## 2021-07-06 DIAGNOSIS — G89.18 PAIN FOLLOWING SURGERY OR PROCEDURE: ICD-10-CM

## 2021-07-06 PROCEDURE — 97530 THERAPEUTIC ACTIVITIES: CPT | Performed by: PHYSICAL THERAPIST

## 2021-07-06 PROCEDURE — 97112 NEUROMUSCULAR REEDUCATION: CPT | Performed by: PHYSICAL THERAPIST

## 2021-07-06 PROCEDURE — 97110 THERAPEUTIC EXERCISES: CPT | Performed by: PHYSICAL THERAPIST

## 2021-07-06 NOTE — PROGRESS NOTES
[facility]   Phone: [phone]             Fax: [phone]      Physical Therapy Daily Treatment Note    Date: 2021  Patient Name: Ligia Mortensen  : 1960   MRN: [id number]  DOInjury:    DOSx: 2021   Referring Provider: Gareth Villegas DPM  [address], [address] [facility] Diagnosis:   A48 (ICD-10-CM) - Postoperative examination   G89.18 (ICD-10-CM) - Pain following surgery or procedure   S93.401D (ICD-10-CM) - Rupture of ligament of ankle, right, subsequent encounter   S86.311D (ICD-10-CM) - Peroneal tendon rupture, right, subsequent encounter   M25.571, G89.29 (ICD-10-CM) - Chronic pain of right ankle     Outcome Measure:  LEFS 42/80    S: Pt reports hard to tell if its her foot or knees when asked about pain. Pt reports that skin breakdown is healed and came to therapy wearing ankle air cast again. 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 O:   Time [id number] - 8426     Visit 3/12 Repeat outcome measure at mid point and end.     P  ain See above      ROM Right:   AROM: 10° Dorsiflexion,  40° Plantarflexion, 10° Inversion, 12° Eversion      Modalities            Manual            Stretch      gastroc stretch seated 3 x 30s   te   Soleus stretch seated  3 x 30s   te   Exercise      Bike 8  minutes   te   Ankle 4 way motion      Ankle T band 2 x 10  GTB  te   Ankle saucer  motion  20x  PF/DF, EV/INV, CW, CCW te   Ankle alphabet 2 x  HEP TE   SLS 30 sec each leg  NMR   Ankle circles  CW, CCW ; HEP TE   Toe raises standing  2 x 10   te    calf raises standing  2 x 10   te   Step-ups - FWD X 20 B  6 inch step  ta   Step-ups - LAT up and over  1 minute  6 inch step  ta   Step-ups - BWD        NMR To improve balance for safe community and home ambulation    Resisted walk      FWD      BKWD      lat 1 x 4 each  Cable system 50# = 12.5# NMR   March      Side stepping      marching on foam  2 x 1 min    NMR   Heel to toe      A:  Tolerated well. Pt reports  Knee bothers her more than foot/ankle again end of session.      P: Continue with rehab plan  [facility], PT DPT, PT [id number]    Treatment Charges: Mins Units   Initial Evaluation     Re-Evaluation     Ther Exercise         TE 24 1   Manual Therapy     MT     Ther Activities        TA 8 1   Gait Training          GT     Neuro Re-education NR 15 1   Modalities     Non-Billable Service Time     Other     Total Time/Units 47 3

## 2021-07-07 ENCOUNTER — TREATMENT (OUTPATIENT)
Dept: PHYSICAL THERAPY | Age: 61
End: 2021-07-07
Payer: COMMERCIAL

## 2021-07-07 DIAGNOSIS — G89.18 PAIN FOLLOWING SURGERY OR PROCEDURE: Primary | ICD-10-CM

## 2021-07-07 PROCEDURE — 97110 THERAPEUTIC EXERCISES: CPT

## 2021-07-07 PROCEDURE — 97530 THERAPEUTIC ACTIVITIES: CPT

## 2021-07-07 PROCEDURE — 97112 NEUROMUSCULAR REEDUCATION: CPT

## 2021-07-07 NOTE — PROGRESS NOTES
Evaluation     Re-Evaluation     Ther Exercise         TE 30 1   Manual Therapy     MT     Ther Activities        TA 8 1   Gait Training          GT     Neuro Re-education NR 10 1   Modalities     Non-Billable Service Time     Other     Total Time/Units 48 3

## 2021-07-12 ENCOUNTER — OFFICE VISIT (OUTPATIENT)
Dept: PODIATRY | Age: 61
End: 2021-07-12
Payer: COMMERCIAL

## 2021-07-12 ENCOUNTER — TREATMENT (OUTPATIENT)
Dept: PHYSICAL THERAPY | Age: 61
End: 2021-07-12
Payer: COMMERCIAL

## 2021-07-12 VITALS — SYSTOLIC BLOOD PRESSURE: 124 MMHG | DIASTOLIC BLOOD PRESSURE: 78 MMHG | TEMPERATURE: 98.2 F

## 2021-07-12 DIAGNOSIS — M79.5 FOREIGN BODY (FB) IN SOFT TISSUE: Primary | ICD-10-CM

## 2021-07-12 DIAGNOSIS — G89.18 PAIN FOLLOWING SURGERY OR PROCEDURE: Primary | ICD-10-CM

## 2021-07-12 DIAGNOSIS — Z09 POSTOPERATIVE EXAMINATION: ICD-10-CM

## 2021-07-12 PROCEDURE — 97530 THERAPEUTIC ACTIVITIES: CPT

## 2021-07-12 PROCEDURE — 97112 NEUROMUSCULAR REEDUCATION: CPT

## 2021-07-12 PROCEDURE — 97110 THERAPEUTIC EXERCISES: CPT

## 2021-07-12 PROCEDURE — 10120 INC&RMVL FB SUBQ TISS SMPL: CPT | Performed by: PODIATRIST

## 2021-07-12 PROCEDURE — 99213 OFFICE O/P EST LOW 20 MIN: CPT | Performed by: PODIATRIST

## 2021-07-12 NOTE — PROGRESS NOTES
21  Sandy Chester : 1960 Sex: female  Age: 62 y.o. Patient was referred by Jeronimo Shaikh DO    Chief Complaint   Patient presents with    Post-Op Check     right ankle sx from  started PT and wearing stirrup brace she stepped on a piece of glass got it all out wants you to make sure        SUBJECTIVE patient is seen today for follow-up of surgical correction to the right ankle from  she is doing excellent with that she is in therapy having abdominal discomfort wearing an ankle stirrup brace. If she does have a new issue today she stepped on a piece of glass 2 days ago at home. Patient thinks she got all of it out. Patient denies fever chills or night sweats denies red streaking calf pain. HPI  Review of Systems  Const: Denies constitutional symptoms  Musculo: Denies symptoms other than stated above  Skin: Denies symptoms other than stated above     No current outpatient medications on file.   Allergies   Allergen Reactions    Latex      Leaves marks on skin    Aspirin Hives and Shortness Of Breath     sick  Other reaction(s): Disease type AND/OR category unknown (finding), GI Upset    Codeine Hives and Shortness Of Breath     fatique  Other reaction(s): Disease type AND/OR category unknown (finding)    Penicillins Hives and Shortness Of Breath     swelling  Other reaction(s): Disease type AND/OR category unknown (finding), GI Upset  headaches      Soap Hives and Shortness Of Breath    Bee Venom      Other reaction(s): Disease type AND/OR category unknown (finding)    Sulfa Antibiotics      Family history        Past Medical History:   Diagnosis Date    Asthma     Right foot pain      Past Surgical History:   Procedure Laterality Date    ANKLE SURGERY Right 2021    REPAIR ANTERIOR TALAR FIBULAR LIGAMENT RIGHT FOOT REPAIR PERONEAL TENDON RIGHT FOOT, SURAL NERVE LYSIS  W PRP performed by King Chepe DPM at [address] [place] [address] Tendon repair    HYSTERECTOMY      KNEE SURGERY Bilateral     MENISCECTOMY Right      No family history on file. Social History     Socioeconomic History    Marital status:      Spouse name: Not on file    Number of children: Not on file    Years of education: Not on file    Highest education level: Not on file   Occupational History    Not on file   Tobacco Use    Smoking status: Former Smoker     Types: Cigarettes     Quit date: 1996     Years since quittin.2    Smokeless tobacco: Never Used   Vaping Use    Vaping Use: Never used   Substance and Sexual Activity    Alcohol use: Not Currently    Drug use: Never    Sexual activity: Not on file   Other Topics Concern    Not on file   Social History Narrative    Not on file     Social Determinants of Health     Financial Resource Strain:     Difficulty of Paying Living Expenses:    Food Insecurity:     Worried About Running Out of Food in the Last Year:     [address] in the Last Year:    Transportation Needs:     Lack of Transportation (Medical):      Lack of Transportation (Non-Medical):    Physical Activity:     Days of Exercise per Week:     Minutes of Exercise per Session:    Stress:     Feeling of Stress :    Social Connections:     Frequency of Communication with Friends and Family:     Frequency of Social Gatherings with Friends and Family:     Attends Denominational Services:     Active Member of Clubs or Organizations:     Attends Club or Organization Meetings:     Marital Status:    Intimate Partner Violence:     Fear of Current or Ex-Partner:     Emotionally Abused:     Physically Abused:     Sexually Abused:        Vitals:    21 1542   BP: 124/78   Temp: 98.2 °F (36.8 °C)   TempSrc: Temporal   Weight: Comment: post op       Focused Lower Extremity Physical Exam:  Vitals:    21 1542   BP: 124/78   Temp: 98.2 °F (36.8 °C)        Foot Exam     Ortho Exam    Vascular: pulses  dp  pt 8/2/2021). Seen By:  David Melgar DPM      Document was created using voice recognition software. Note was reviewed, however may contain grammatical errors.

## 2021-07-13 ENCOUNTER — TREATMENT (OUTPATIENT)
Dept: PHYSICAL THERAPY | Age: 61
End: 2021-07-13
Payer: COMMERCIAL

## 2021-07-13 DIAGNOSIS — G89.18 PAIN FOLLOWING SURGERY OR PROCEDURE: Primary | ICD-10-CM

## 2021-07-13 PROCEDURE — 97530 THERAPEUTIC ACTIVITIES: CPT

## 2021-07-13 PROCEDURE — 97112 NEUROMUSCULAR REEDUCATION: CPT

## 2021-07-13 PROCEDURE — 97110 THERAPEUTIC EXERCISES: CPT

## 2021-07-13 NOTE — PROGRESS NOTES
[address] [facility]   Phone: [phone]             Fax: [phone]      Physical Therapy Daily Treatment Note    Date: 2021  Patient Name: Patrice Rojo  : 1960   MRN: [id number]  DOInjury:    DOSx: 2021   Referring Provider: Riccardo Cobb DPM  [address] [facility] [address], [address] [facility] Diagnosis:   K13 (ICD-10-CM) - Postoperative examination   G89.18 (ICD-10-CM) - Pain following surgery or procedure   S93.401D (ICD-10-CM) - Rupture of ligament of ankle, right, subsequent encounter   S86.311D (ICD-10-CM) - Peroneal tendon rupture, right, subsequent encounter   M25.571, G89.29 (ICD-10-CM) - Chronic pain of right ankle     Outcome Measure:  LEFS 42/80    S: Pt reports pain in R heel, physician retrieved more glass from pts foot after she accidentally stepped on a broken glass over the weekend. Will perform more open chain therex this date secondary to this. O:   Time 840- 920     Visit  Repeat outcome measure at mid point and end. P  ain See above      ROM Right:   AROM: 10° Dorsiflexion,  40° Plantarflexion, 10° Inversion, 12° Eversion      Modalities            Manual            Stretch      gastroc stretch seated 3 x 30s   te   Soleus stretch seated  3 x 30s   te   Exercise      Bike 10  minutes   te   Ankle 4 way motion      Ankle T band 2 x 10  GTB NEXT BTB te   Ankle saucer  motion  20x  PF/DF, EV/INV, CW, CCW te   Ankle alphabet 2 x  HEP TE   SLS  NMR   Ankle circles  CW, CCW ; HEP TE   Toe raises standing   te    calf raises standing  2 x 10   te   Step-ups - FWD X 10 B  6 inch step  ta   Step-ups - LAT up and over  6 inch step  ta   Step-ups - BWD 6 inch step       NMR To improve balance for safe community and home ambulation    Resisted walk      FWD      BKWD      lat  Cable system 50# = 12.5# NMR   March      Side stepping      marching on foam     NMR   Heel to toe      A:  Tolerated well.  Pain in ankle minimal following treatment.  Pain in heel is described as throbbing, same as on arrival.    P: Continue with rehab plan   Eren, PTA 71724    Treatment Charges: Mins Units   Initial Evaluation     Re-Evaluation     Ther Exercise         TE 22 1   Manual Therapy     MT     Ther Activities        TA 8 1   Gait Training          GT     Neuro Re-education NR 10 1   Modalities     Non-Billable Service Time     Other     Total Time/Units 40 3

## 2021-07-19 ENCOUNTER — TREATMENT (OUTPATIENT)
Dept: PHYSICAL THERAPY | Age: 61
End: 2021-07-19
Payer: COMMERCIAL

## 2021-07-19 DIAGNOSIS — G89.18 PAIN FOLLOWING SURGERY OR PROCEDURE: Primary | ICD-10-CM

## 2021-07-19 PROCEDURE — 97112 NEUROMUSCULAR REEDUCATION: CPT

## 2021-07-19 PROCEDURE — 97530 THERAPEUTIC ACTIVITIES: CPT

## 2021-07-19 PROCEDURE — 97110 THERAPEUTIC EXERCISES: CPT

## 2021-07-19 NOTE — PROGRESS NOTES
[facility]   Phone: [phone]             Fax: [phone]      Physical Therapy Daily Treatment Note    Date: 2021  Patient Name: Marva Cervantes  : 1960   MRN: [id number]  DOInjury:    DOSx: 2021   Referring Provider: Eriberto Oscar DPM  [address],  [address] [facility] Diagnosis:   J11 (ICD-10-CM) - Postoperative examination   G89.18 (ICD-10-CM) - Pain following surgery or procedure   S93.401D (ICD-10-CM) - Rupture of ligament of ankle, right, subsequent encounter   S86.311D (ICD-10-CM) - Peroneal tendon rupture, right, subsequent encounter   M25.571, G89.29 (ICD-10-CM) - Chronic pain of right ankle     Outcome Measure:  LEFS 42/80    S: Pt reports going to [place] [address] over the weekend and this caused knee pain, minimal ankle pain. Today continues to report knee pain, no ankle pain. O:   Time 847- 930     Visit  Repeat outcome measure at mid point and end. P  ain See above      ROM Right:   AROM: 10° Dorsiflexion,  40° Plantarflexion, 10° Inversion, 12° Eversion      Modalities            Manual            Stretch      gastroc stretch seated 3 x 30s   te   Soleus stretch seated  3 x 30s   te   Exercise      Bike 10  minutes   te   Ankle 4 way motion      Ankle T band 2 x 10  BTB te   Ankle saucer  motion  20x  PF/DF, EV/INV, CW, CCW te   Ankle alphabet  HEP TE   SLS  NMR   Ankle circles  CW, CCW ; HEP TE   Toe raises standing  2 x 10   te    calf raises standing  2 x 10   te   Step-ups - FWD X 20 B  8 inch step  ta   Step-ups - LAT up and over  X 20 8 inch step  ta   Step-ups - BWD X 20 R 6 inch step       NMR To improve balance for safe community and home ambulation    Resisted walk      FWD      BKWD      lat 1 x 5 each  Cable system 50# = 12.5# NMR   March      Side stepping      marching on foam     NMR   Heel to toe        A:  Tolerated well.      P: Continue with rehab plan  Jen Clark, PTA 78526    Treatment Charges: Mins Units   Initial Evaluation     Re-Evaluation     Ther Exercise         TE 25 1   Manual Therapy     MT     Ther Activities        TA 8 1   Gait Training          GT     Neuro Re-education NR 10 1   Modalities     Non-Billable Service Time     Other     Total Time/Units 43 3

## 2021-07-20 ENCOUNTER — TREATMENT (OUTPATIENT)
Dept: PHYSICAL THERAPY | Age: 61
End: 2021-07-20
Payer: COMMERCIAL

## 2021-07-20 DIAGNOSIS — G89.18 PAIN FOLLOWING SURGERY OR PROCEDURE: Primary | ICD-10-CM

## 2021-07-20 PROCEDURE — 97530 THERAPEUTIC ACTIVITIES: CPT

## 2021-07-20 PROCEDURE — 97112 NEUROMUSCULAR REEDUCATION: CPT

## 2021-07-20 PROCEDURE — 97110 THERAPEUTIC EXERCISES: CPT

## 2021-07-20 NOTE — PROGRESS NOTES
[facility]   Phone: [phone]             Fax: [phone]      Physical Therapy Daily Treatment Note    Date: 2021  Patient Name: Aime Mccracken  : 1960   MRN: [id number]  DOInjury:    DOSx: 2021   Referring Provider: Magnolia Pascual DPM  [address],  [address] [facility] Diagnosis:   K79 (ICD-10-CM) - Postoperative examination   G89.18 (ICD-10-CM) - Pain following surgery or procedure   S93.401D (ICD-10-CM) - Rupture of ligament of ankle, right, subsequent encounter   S86.311D (ICD-10-CM) - Peroneal tendon rupture, right, subsequent encounter   M25.571, G89.29 (ICD-10-CM) - Chronic pain of right ankle     Outcome Measure:  LEFS 42/80    S: Pt reports no pian this morning. O:   Time 845- 925     Visit  Repeat outcome measure at mid point and end. P  ain See above      ROM Right:   AROM: 10° Dorsiflexion,  40° Plantarflexion, 10° Inversion, 12° Eversion      Modalities            Manual            Stretch      gastroc stretch seated 3 x 30s   te   Soleus stretch seated  3 x 30s   te   Exercise      Bike 10  minutes   te   Ankle 4 way motion      Ankle T band 2 x 10  BTB te   Ankle saucer  motion  20x  PF/DF, EV/INV, CW, CCW te   Ankle alphabet  HEP TE   SLS  NMR   Ankle circles  CW, CCW ; HEP TE   Toe raises standing  2 x 10   te    calf raises standing  2 x 10   te   Step-ups - FWD X 20 B  8 inch step  ta   Step-ups - LAT up and over  X 20 8 inch step  ta   Step-ups - BWD X 20 R 6 inch step       NMR To improve balance for safe community and home ambulation    Resisted walk      FWD      BKWD      lat 1 x 10 each  Cable system 50# = 12.5#  INCREASE NEXT NMR   March      Side stepping      marching on foam     NMR   Heel to toe        A:  Tolerated well. Pt reports ankle \"looks a little swollen\" but no ankle pain. Knee pain is more bothersome than her ankle. P: Continue with rehab plan.     Yenifer Yoder PTA 88497    Treatment Charges: Mins Units   Initial Evaluation     Re-Evaluation     Ther Exercise         TE 20 1   Manual Therapy     MT     Ther Activities        TA 10 1   Gait Training          GT     Neuro Re-education NR 10 1   Modalities     Non-Billable Service Time     Other     Total Time/Units 40 3

## 2021-07-26 ENCOUNTER — TREATMENT (OUTPATIENT)
Dept: PHYSICAL THERAPY | Age: 61
End: 2021-07-26
Payer: COMMERCIAL

## 2021-07-26 DIAGNOSIS — G89.18 PAIN FOLLOWING SURGERY OR PROCEDURE: Primary | ICD-10-CM

## 2021-07-26 PROCEDURE — 97110 THERAPEUTIC EXERCISES: CPT

## 2021-07-26 PROCEDURE — 97530 THERAPEUTIC ACTIVITIES: CPT

## 2021-07-26 PROCEDURE — 97112 NEUROMUSCULAR REEDUCATION: CPT

## 2021-07-26 NOTE — PROGRESS NOTES
[address] [facility]   Phone: [phone]             Fax: [phone]      Physical Therapy Daily Treatment Note    Date: 2021  Patient Name: Patrice Rojo  : 1960   MRN: [id number]  DOInjury:    DOSx: 2021   Referring Provider: Riccardo Cobb DPM  [address] [facility] [address], [address] [facility] Diagnosis:   X66 (ICD-10-CM) - Postoperative examination   G89.18 (ICD-10-CM) - Pain following surgery or procedure   S93.401D (ICD-10-CM) - Rupture of ligament of ankle, right, subsequent encounter   S86.311D (ICD-10-CM) - Peroneal tendon rupture, right, subsequent encounter   M25.571, G89.29 (ICD-10-CM) - Chronic pain of right ankle     Outcome Measure:  LEFS 42/80    S: Pt reports pain in R ankle 5/10, reports need to sit down from time to time this morning. Reports \"can't get comfortable. \" Pt does report she had been doing a lot of walking and working outside over the weekend. O:   Time 849- 930     Visit  Repeat outcome measure at mid point and end.     P  ain See above      ROM Right:   AROM: 10° Dorsiflexion,  40° Plantarflexion, 10° Inversion, 12° Eversion      Modalities            Manual            Stretch      gastroc stretch seated 3 x 30s   te   Soleus stretch seated  3 x 30s   te   Exercise      Bike 10  minutes   te   Ankle 4 way motion      Ankle T band 2 x 10  BTB te   Ankle saucer  motion  PF/DF, EV/INV, CW, CCW te   Ankle alphabet  HEP TE   SLS 30 sec each leg  NMR   Ankle circles  CW, CCW ; HEP TE   Toe raises standing  2 x 10   te    calf raises standing  2 x 10   te   Step-ups - FWD X 20 B  8 inch step  ta   Step-ups - LAT up and over  X 20 8 inch step  ta   Step-ups - BWD  6 inch step       NMR To improve balance for safe community and home ambulation    Resisted walk      FWD      BKWD      lat 1 x 10 each  Cable system 60# = 15#   NMR   March      Side stepping      marching on foam     NMR   Heel to toe        A:  Tolerated well. Increased wt for resisted walking this date. P: Continue with rehab plan.     Yari Kan, PTA 82181    Treatment Charges: Mins Units   Initial Evaluation     Re-Evaluation     Ther Exercise         TE 20 1   Manual Therapy     MT     Ther Activities        TA 10 1   Gait Training          GT     Neuro Re-education NR 11 1   Modalities     Non-Billable Service Time     Other     Total Time/Units 41 3

## 2021-07-27 ENCOUNTER — TREATMENT (OUTPATIENT)
Dept: PHYSICAL THERAPY | Age: 61
End: 2021-07-27
Payer: COMMERCIAL

## 2021-07-27 DIAGNOSIS — S86.311D PERONEAL TENDON RUPTURE, RIGHT, SUBSEQUENT ENCOUNTER: ICD-10-CM

## 2021-07-27 DIAGNOSIS — G89.18 PAIN FOLLOWING SURGERY OR PROCEDURE: Primary | ICD-10-CM

## 2021-07-27 DIAGNOSIS — S93.401D RUPTURE OF LIGAMENT OF ANKLE, RIGHT, SUBSEQUENT ENCOUNTER: ICD-10-CM

## 2021-07-27 DIAGNOSIS — G89.29 CHRONIC PAIN OF RIGHT ANKLE: ICD-10-CM

## 2021-07-27 DIAGNOSIS — M25.571 CHRONIC PAIN OF RIGHT ANKLE: ICD-10-CM

## 2021-07-27 PROCEDURE — 97112 NEUROMUSCULAR REEDUCATION: CPT | Performed by: PHYSICAL THERAPIST

## 2021-07-27 PROCEDURE — 97110 THERAPEUTIC EXERCISES: CPT | Performed by: PHYSICAL THERAPIST

## 2021-07-27 PROCEDURE — 97530 THERAPEUTIC ACTIVITIES: CPT | Performed by: PHYSICAL THERAPIST

## 2021-07-27 NOTE — PROGRESS NOTES
[facility]   Phone: [phone]             Fax: [phone]      Physical Therapy Daily Treatment Note    Date: 2021  Patient Name: Rhonda Ng  : 1960   MRN: [id number]  DOInjury:    DOSx: 2021   Referring Provider: Gissell Taylor DPM  [address], [address] [facility] Diagnosis:   T18 (ICD-10-CM) - Postoperative examination   G89.18 (ICD-10-CM) - Pain following surgery or procedure   S93.401D (ICD-10-CM) - Rupture of ligament of ankle, right, subsequent encounter   S86.311D (ICD-10-CM) - Peroneal tendon rupture, right, subsequent encounter   M25.571, G89.29 (ICD-10-CM) - Chronic pain of right ankle     Outcome Measure:  LEFS 42/80    S: Pt reports pain in R ankle 1-2/10, just a pinch but reports hasn't done much yet this morning. Pt reports able to go up steps easier but down is still harder. Pt reports walking is improved. O:   Time 404- 8434     Visit 10/12 Repeat outcome measure at mid point and end. P  ain See above      ROM Right:   AROM: 10° Dorsiflexion,  40° Plantarflexion, 10° Inversion, 12° Eversion      Modalities            Manual            Stretch      gastroc stretch seated 3 x 30s   te   Soleus stretch seated  3 x 30s   te   Exercise      Bike 10  minutes   te   Ankle 4 way motion      Ankle T band 2 x 10  BTB te   Ankle saucer  motion  PF/DF, EV/INV, CW, CCW te   Ankle alphabet  HEP TE   SLS  NMR   Ankle circles  CW, CCW ; HEP TE   Toe raises standing  2 x 10   te    calf raises standing  2 x 10   te   Step-ups - FWD X 20 B  8 inch step  ta   Step-ups - LAT up and over  X 20 8 inch step  ta   Step-ups - BWD X 20 R 6 inch step  ta     NMR To improve balance for safe community and home ambulation    Resisted walk      FWD      BKWD      lat 1 x 10 each  Cable system 60# = 15#   NMR   March      Side stepping      marching on foam     NMR   Heel to toe        A:  Tolerated well.   Worked on going up step backward with R leg and back down forward leading with L to help pt improve going down stairs. P: Continue with rehab plan.     Binta Zheng, [place] 632492    Treatment Charges: Mins Units   Initial Evaluation     Re-Evaluation     Ther Exercise         TE 20 1   Manual Therapy     MT     Ther Activities        TA 12 1   Gait Training          GT     Neuro Re-education NR 10 1   Modalities     Non-Billable Service Time     Other     Total Time/Units 42 3

## 2021-08-02 ENCOUNTER — TREATMENT (OUTPATIENT)
Dept: PHYSICAL THERAPY | Age: 61
End: 2021-08-02
Payer: COMMERCIAL

## 2021-08-02 ENCOUNTER — OFFICE VISIT (OUTPATIENT)
Dept: PODIATRY | Age: 61
End: 2021-08-02

## 2021-08-02 VITALS — TEMPERATURE: 97.1 F | SYSTOLIC BLOOD PRESSURE: 133 MMHG | DIASTOLIC BLOOD PRESSURE: 78 MMHG

## 2021-08-02 DIAGNOSIS — S86.311D PERONEAL TENDON RUPTURE, RIGHT, SUBSEQUENT ENCOUNTER: ICD-10-CM

## 2021-08-02 DIAGNOSIS — G89.18 PAIN FOLLOWING SURGERY OR PROCEDURE: ICD-10-CM

## 2021-08-02 DIAGNOSIS — M25.571 CHRONIC PAIN OF RIGHT ANKLE: ICD-10-CM

## 2021-08-02 DIAGNOSIS — G89.29 CHRONIC PAIN OF RIGHT ANKLE: ICD-10-CM

## 2021-08-02 DIAGNOSIS — Z09 POSTOPERATIVE EXAMINATION: Primary | ICD-10-CM

## 2021-08-02 DIAGNOSIS — S93.401D RUPTURE OF LIGAMENT OF ANKLE, RIGHT, SUBSEQUENT ENCOUNTER: Primary | ICD-10-CM

## 2021-08-02 PROCEDURE — 99024 POSTOP FOLLOW-UP VISIT: CPT | Performed by: PODIATRIST

## 2021-08-02 PROCEDURE — 97530 THERAPEUTIC ACTIVITIES: CPT | Performed by: PHYSICAL THERAPIST

## 2021-08-02 PROCEDURE — 97112 NEUROMUSCULAR REEDUCATION: CPT | Performed by: PHYSICAL THERAPIST

## 2021-08-02 PROCEDURE — 97110 THERAPEUTIC EXERCISES: CPT | Performed by: PHYSICAL THERAPIST

## 2021-08-02 NOTE — PROGRESS NOTES
[facility]   Phone: [phone]             Fax: [phone]      Physical Therapy Daily Treatment Note    Date: 2021  Patient Name: Crystal Hart  : 1960   MRN: [id number]  DOInjury:    DOSx: 2021   Referring Provider: Trinidad Ortez DPM  [address]  [address] [facility] Diagnosis:   D82 (ICD-10-CM) - Postoperative examination   G89.18 (ICD-10-CM) - Pain following surgery or procedure   S93.401D (ICD-10-CM) - Rupture of ligament of ankle, right, subsequent encounter   S86.311D (ICD-10-CM) - Peroneal tendon rupture, right, subsequent encounter   M25.571, G89.29 (ICD-10-CM) - Chronic pain of right ankle     Outcome Measure:  LEFS 42/80    S: Pt reports just saw doc and that he would like therapy for 4 more weeks. Reports pain around 4-10. O:   Time 840 - 0920     Visit  Repeat outcome measure at mid point and end. P  ain See above      ROM Right:   AROM: 10° Dorsiflexion,  40° Plantarflexion, 10° Inversion, 12° Eversion      Modalities            Manual            Stretch      gastroc stretch seated 3 x 30s   te   Soleus stretch seated  3 x 30s   te   Exercise      Bike 10  minutes   te   Ankle 4 way motion      Ankle T band 2 x 10  BTB te   Ankle saucer  motion  PF/DF, EV/INV, CW, CCW te   Ankle alphabet  HEP TE   SLS  NMR   Ankle circles  CW, CCW ; HEP TE   Toe raises standing  2 x 10   te    calf raises standing  2 x 10   te   Step-ups - FWD X 20 B  8 inch step  ta   Step-ups - LAT up and over  X 20 8 inch step  ta   Step-ups - BWD X 20 R 8 inch step  ta     NMR To improve balance for safe community and home ambulation    Resisted walk      FWD      BKWD      lat 1 x 10 each  Cable system 60# = 15#   NMR   March      Side stepping      marching on foam     NMR   Heel to toe        A:  Tolerated well. Pt reports pain 8/10 end of session though pain did not hinder activities in session.  Advised pt to ice and elevate at home. Pt agreeable. P: Continue with rehab plan.     Ag Pizano, [place] [address]37508    Treatment Charges: Mins Units   Initial Evaluation     Re-Evaluation     Ther Exercise         TE 20 1   Manual Therapy     MT     Ther Activities        TA 12 1   Gait Training          GT     Neuro Re-education NR 8 1   Modalities     Non-Billable Service Time     Other     Total Time/Units 40 3

## 2021-08-02 NOTE — PROGRESS NOTES
Postop Progress Note    Subjective    Franci Najera presents to the office 3 months  following ankle sx . Eating a regular diet without difficulty. Bowel movements are normal.      Objective    Vitals:    08/02/21 0802   BP: 133/78   Temp: 97.1 °F (36.2 °C)     General: alert, cooperative and no distress  Incision: healing well    Assessment    Doing well postoperatively. Plan   1. Continue any current medications  2. Wound care discussed  3. Wound/Incision: healing well  4. Disposition:   Pt is to increase activities as tolerated. Should remain out of work until september 1st  .  5. Diet: regular diet  6. Follow up: 1 month.            Electronically signed by Jamari Anna DPM on 8/2/2021 at 8:10 AM

## 2021-08-03 ENCOUNTER — TREATMENT (OUTPATIENT)
Dept: PHYSICAL THERAPY | Age: 61
End: 2021-08-03
Payer: COMMERCIAL

## 2021-08-03 DIAGNOSIS — S93.401D RUPTURE OF LIGAMENT OF ANKLE, RIGHT, SUBSEQUENT ENCOUNTER: Primary | ICD-10-CM

## 2021-08-03 DIAGNOSIS — M25.571 CHRONIC PAIN OF RIGHT ANKLE: ICD-10-CM

## 2021-08-03 DIAGNOSIS — G89.18 PAIN FOLLOWING SURGERY OR PROCEDURE: ICD-10-CM

## 2021-08-03 DIAGNOSIS — S86.311D PERONEAL TENDON RUPTURE, RIGHT, SUBSEQUENT ENCOUNTER: ICD-10-CM

## 2021-08-03 DIAGNOSIS — G89.29 CHRONIC PAIN OF RIGHT ANKLE: ICD-10-CM

## 2021-08-03 PROCEDURE — 97164 PT RE-EVAL EST PLAN CARE: CPT | Performed by: PHYSICAL THERAPIST

## 2021-08-03 PROCEDURE — 97110 THERAPEUTIC EXERCISES: CPT | Performed by: PHYSICAL THERAPIST

## 2021-08-03 NOTE — PROGRESS NOTES
[facility]   Phone: [phone]             Fax: [phone]      Physical Therapy Daily Treatment Note    Date: 8/3/2021  Patient Name: Chantelle Serrato  : 1960   MRN: [id number]  DOInjury:    DOSx: 2021   Referring Provider: Helene Branch DPM  [address],  [address] [facility] Diagnosis:   S73 (ICD-10-CM) - Postoperative examination   G89.18 (ICD-10-CM) - Pain following surgery or procedure   S93.401D (ICD-10-CM) - Rupture of ligament of ankle, right, subsequent encounter   S86.311D (ICD-10-CM) - Peroneal tendon rupture, right, subsequent encounter   M25.571, G89.29 (ICD-10-CM) - Chronic pain of right ankle     Outcome Measure:  LEFS 42/80      S: Pt reports 10 pain today. O:   Time 127- 0918     Visit  Repeat outcome measure at mid point and end. P  ain See above      ROM Right:   AROM: 15° Dorsiflexion,  40° Plantarflexion, 16° Inversion, 16° Eversion      Modalities            Manual            Stretch      gastroc stretch seated  te   Soleus stretch seated   te   Exercise     Bike 10  minutes  te   Ankle 4 way motion     Ankle T band BTB te   Ankle saucer  motion PF/DF, EV/INV, CW, CCW te   Ankle alphabet HEP TE   SLS  NMR   Ankle circles  CW, CCW ; HEP TE   Toe raises standing   te    calf raises standing   te   Step-ups - FWD 8 inch step  ta   Step-ups - LAT up and over  8 inch step  ta   Step-ups - BWD 8 inch step  ta     To improve balance for safe community and home ambulation    Resisted walk      FWD      BKWD      lat Cable system 60# = 15#   NMR   March     Side stepping     marching on foam   NMR   Heel to toe        A:  Tolerated well. Reassessment completed today. See note for details. P: Continue with rehab plan 2 x week x 4-6 weeks.      Binta Zheng, [place] [address]70    Treatment Charges: Mins Units   Initial Evaluation     Re-Evaluation 24 1   Ther Exercise         TE 10 1   Manual Therapy MT     Ther Activities        TA     Gait Training          GT     Neuro Re-education NR     Modalities     Non-Billable Service Time     Other     Total Time/Units 34 2

## 2021-08-03 NOTE — PROGRESS NOTES
[address] [facility]   Phone: [phone]   Fax: [phone]        Referring Provider: Papiraissachris Su, EDMOND  [address] [facility] Diagnosis:   Z80 (ICD-10-CM) - Postoperative examination   G89.18 (ICD-10-CM) - Pain following surgery or procedure   S93.401D (ICD-10-CM) - Rupture of ligament of ankle, right, subsequent encounter   S86.311D (ICD-10-CM) - Peroneal tendon rupture, right, subsequent encounter   M25.571, G89.29 (ICD-10-CM) - Chronic pain of right ankle          CERTIFICATION PERIOD:  6/24/2021  to 8/6/2021. ATTENDANCE:  Patient has attended 15 of 12 scheduled treatments from 6/24/2021  to 8/3/2021. TREATMENTS RECEIVED:  Therapeutic exercise, therapeutic activity, neuro reeducation     INITIAL STATUS:  Observations: well nourished female                Edema:   R figure 8: 52.5 cm   L figure 8: 51.5 cm      Gait: antalgic, altered with short step length, width, height     Joint/Motion:     Ankle:  Right:   AROM: 10° Dorsiflexion,  40° Plantarflexion, 10° Inversion, 12° Eversion      Left:   AROM: 20° Dorsiflexion,  50° Plantarflexion, 35° Inversion, 25° Eversion        Strength:     Ankle:  Right: Dorsiflexion 4/5, Plantarflexion 4/5, Inversion 4/5, Eversion 4/5  Left: Dorsiflexion 5/5, Plantarflexion 5/5, Inversion 5/5, Eversion 5/5     Palpation: Tender to palpation around area of incisions       Special Tests/Functional Screens: N/T d/t postop  []? Anterior Drawer []?+ / []? -  []? Eversion Stress: []?+ / []? -  []? Butler Test []?+ / []? -             []? Tib-Fib Compression Test []?+ / []? -     []? Inversion Stress []?+ / []? -     []? Squeeze Test []?+ / []? -   []? Marie's Sign []?+ / []? -   []?  Other: []?+ / []?                 CURRENT STATUS:  Observations: well nourished female                Edema:   R figure 8: 52 cm   L figure 8: 51 cm      Gait: antalgic, altered with short step length, width, height but improving      Joint/Motion:     Ankle:  Right:   AROM: 15° Dorsiflexion,  40° Plantarflexion, 16° Inversion, 16° Eversion      Left:   AROM: 20° Dorsiflexion,  50° Plantarflexion, 35° Inversion, 25° Eversion        Strength:     Ankle:  Right: Dorsiflexion 4+/5, Plantarflexion 4+/5, Inversion 4+/5, Eversion 4+/5  Left: Dorsiflexion 5/5, Plantarflexion 5/5, Inversion 5/5, Eversion 5/5     Palpation: Tender to palpation around area of incisions       Special Tests/Functional Screens: N/T d/t postop  []? Anterior Drawer []?+ / []? -  []? Eversion Stress: []?+ / []? -  []? Butler Test []?+ / []? -             []? Tib-Fib Compression Test []?+ / []? -     []? Inversion Stress []?+ / []? -     []? Squeeze Test []?+ / []? -   []? Marie's Sign []?+ / []? -   []? Other: []?+ / []?        Short Term goals (3 weeks)  · Decrease reported pain to 0-6/10 (goal met)   · Increase ROM to AROM: 15° Dorsiflexion,  45° Plantarflexion, 20° Inversion, 20° Eversion (not met)  · Increase Strength to 4+/5 (goal  Met)   · Able to perform/complete the following functions/tasks: pt able to perform 10 sit to stands with 1 UE support with minor pain/limitation. Pt able to go up/down 5 steps with 1 rail with minor pain/limitation. Pt able to walk 20 minutes with minor pain/limitation.  (goal met)  · Lower Extremity Functional Scale (LEFS) 50/80 impairment (not met)     Long Term goals (6 weeks)  · Decrease reported pain to 0-3/10 (not met)  · Increase ROM to AROM: 20° Dorsiflexion,  50° Plantarflexion, 35° Inversion, 25° Eversion (not met)  · Increase strength to 5/5 (not met)  · Able to complete the following functions/tasks: pt able to perform 10 sit to stands without UE support with no pain/limitation. Pt able to go up/down flight of steps with no pain/limitation.   Pt able to walk 30+ minutes with no pain/limitation.  (partial goal met)  · LEFS 60/80 impairment (not met)  · Independent with home exercise program (HEP)         OUTCOME MEASURE:  LEFS 42/80     COMMENTS AND RECOMMENDATIONS:   Pt has met majority of short term goals but not long term goals. Pt slowly increasing ROM; strength gains are coming much quicker. Pt able to perform 10 sit to stands without UE support in session but with some pain. Pt able to perform 10 step ups with 8 inch step leading with R and L to simulate flight of steps. Pt reports 30 minutes is her limit to walk with minimal pain before it increases to more moderate pain. Recommend pt continue with therapy 2x week x 4 -6 weeks to continue to improve and work toward long term goals. Thank you for the opportunity to work with your patient. Heidy Guerrero, PT DPT [id number]    I CERTIFY THAT THE ABOVE REASSESSMENT AND PLAN OF CARE FOR PHYSICAL THERAPY SERVICES ARE APPROPRIATE AND MEDICALLY NECESSARY.     Duration: From 8/3/2021 thru 9/17/2021    ________________________                _______________  Physician     Date

## 2021-08-09 ENCOUNTER — TREATMENT (OUTPATIENT)
Dept: PHYSICAL THERAPY | Age: 61
End: 2021-08-09
Payer: COMMERCIAL

## 2021-08-09 DIAGNOSIS — G89.18 PAIN FOLLOWING SURGERY OR PROCEDURE: Primary | ICD-10-CM

## 2021-08-09 PROCEDURE — 97110 THERAPEUTIC EXERCISES: CPT

## 2021-08-09 PROCEDURE — 97112 NEUROMUSCULAR REEDUCATION: CPT

## 2021-08-09 NOTE — PROGRESS NOTES
[facility]   Phone: [phone]             Fax: [phone]      Physical Therapy Daily Treatment Note    Date: 2021  Patient Name: Jc Kc  : 1960   MRN: [id number]  DOInjury:    DOSx: 2021   Referring Provider: Iris Thompson DPM  [address],  [address] [facility] Diagnosis:   W02 (ICD-10-CM) - Postoperative examination   G89.18 (ICD-10-CM) - Pain following surgery or procedure   S93.401D (ICD-10-CM) - Rupture of ligament of ankle, right, subsequent encounter   S86.311D (ICD-10-CM) - Peroneal tendon rupture, right, subsequent encounter   M25.571, G89.29 (ICD-10-CM) - Chronic pain of right ankle     Outcome Measure:  LEFS 42/80      S: Pt reports 3/10 \"jabbing\" pain this morning. Pt with red scaly patch of skin along lateral aspect of foot. Some edema noted. Recommended that pt contact surgeon. O:   Time 738- 636     Visit   1 Repeat outcome measure at mid point and end. P  ain See above      ROM Right:   AROM: 15° Dorsiflexion,  40° Plantarflexion, 16° Inversion, 16° Eversion      Modalities            Manual            Stretch      gastroc stretch seated 3 x 30s   te   Soleus stretch seated  3 x 30s   te   Exercise     Bike 10  minutes  te   Ankle 4 way motion     Ankle T band 2 x 10 BTB te   Ankle saucer  motion  20x  PF/DF, EV/INV, CW, CCW te   Ankle alphabet HEP TE   SLS  NMR   Ankle circles  CW, CCW ; HEP TE   Toe raises standing   te    calf raises standing   te   Step-ups - FWD 8 inch step  ta   Step-ups - LAT up and over  8 inch step  ta   Step-ups - BWD 8 inch step  ta     To improve balance for safe community and home ambulation    Resisted walk      FWD      BKWD      lat 1 x 10 each Cable system 60# = 15#   NMR   March     Side stepping     Step up fwd/lat X 20 eaBlue foam     marching on foam   NMR   Heel to toe        A:  Tolerated well.  Slight increase in pain with PF on saucer, otherwise no increase in pain with other exercises. Following treatment pt reports pain increased to 5/10. P: Continue with rehab plan.     Perlita Chahal, PTA 15514    Treatment Charges: Mins Units   Initial Evaluation     Re-Evaluation     Ther Exercise         TE 25 2   Manual Therapy     MT     Ther Activities        TA     Gait Training          GT     Neuro Re-education NR 15 1   Modalities     Non-Billable Service Time     Other     Total Time/Units 40 3

## 2021-08-10 ENCOUNTER — TREATMENT (OUTPATIENT)
Dept: PHYSICAL THERAPY | Age: 61
End: 2021-08-10
Payer: COMMERCIAL

## 2021-08-10 DIAGNOSIS — G89.18 PAIN FOLLOWING SURGERY OR PROCEDURE: Primary | ICD-10-CM

## 2021-08-10 PROCEDURE — 97110 THERAPEUTIC EXERCISES: CPT

## 2021-08-10 PROCEDURE — 97112 NEUROMUSCULAR REEDUCATION: CPT

## 2021-08-10 NOTE — PROGRESS NOTES
[facility]   Phone: [phone]             Fax: [phone]      Physical Therapy Daily Treatment Note    Date: 8/10/2021  Patient Name: Mary Anne Villa  : 1960   MRN: [id number]  DOInjury:    DOSx: 2021   Referring Provider: Kelly Son DPM  [address],  [address] [facility] Diagnosis:   D66 (ICD-10-CM) - Postoperative examination   G89.18 (ICD-10-CM) - Pain following surgery or procedure   S93.401D (ICD-10-CM) - Rupture of ligament of ankle, right, subsequent encounter   S86.311D (ICD-10-CM) - Peroneal tendon rupture, right, subsequent encounter   M25.571, G89.29 (ICD-10-CM) - Chronic pain of right ankle     Outcome Measure:  LEFS 42/80      S: Pt reports pain 2/10 \"pinchy\". Pt arrives with no air cast, per pt  discontinued from physician yesterday. Pt presents with copper compression sleeve on foot. Too big for pt. Instructed to get the smaller size. O:   Time 966-197     Visit   2 Repeat outcome measure at mid point and end. P  ain See above      ROM Right:   AROM: 15° Dorsiflexion,  40° Plantarflexion, 16° Inversion, 16° Eversion      Modalities            Manual            Stretch      gastroc stretch seated 3 x 30s   te   Soleus stretch seated  3 x 30s   te   Exercise     Bike 5  minutes  te   Ankle 4 way motion     Ankle T band 2 x 10 BTB te   Ankle saucer  motion  20x  PF/DF, EV/INV, CW, CCW te   Ankle alphabet HEP TE   SLS  NMR   Ankle circles   x 10 CW, CCW ; HEP TE   Toe raises standing   te    calf raises standing   te   Step-ups - FWD 8 inch step  ta   Step-ups - LAT up and over  8 inch step  ta   Step-ups - BWD 8 inch step  ta     To improve balance for safe community and home ambulation    Resisted walk      FWD      BKWD      lat 1 x 10 each Cable system 60# = 15#   NMR   March     Side stepping     Step up fwd/lat X 20 ea    marching on foam   NMR   Heel to toe        A:  Tolerated well.   Reports same pain following treatment as on arrival.     P: Continue with rehab plan.     Brenda Ferraro, PTA 08075    Treatment Charges: Mins Units   Initial Evaluation     Re-Evaluation     Ther Exercise         TE     Manual Therapy     MT 25 2   Ther Activities        TA     Gait Training          GT     Neuro Re-education NR 14 1   Modalities     Non-Billable Service Time     Other     Total Time/Units 38 3

## 2021-08-16 ENCOUNTER — TREATMENT (OUTPATIENT)
Dept: PHYSICAL THERAPY | Age: 61
End: 2021-08-16
Payer: COMMERCIAL

## 2021-08-16 DIAGNOSIS — G89.18 PAIN FOLLOWING SURGERY OR PROCEDURE: Primary | ICD-10-CM

## 2021-08-16 PROCEDURE — 97112 NEUROMUSCULAR REEDUCATION: CPT

## 2021-08-16 PROCEDURE — 97110 THERAPEUTIC EXERCISES: CPT

## 2021-08-16 NOTE — PROGRESS NOTES
[facility]   Phone: [phone]             Fax: [phone]      Physical Therapy Daily Treatment Note    Date: 2021  Patient Name: Manoj Marquez  : 1960   MRN: [id number]  DOInjury:    DOSx: 2021   Referring Provider: Jose Angel Castellanos DPM  [address],  [address] [facility] Diagnosis:   W76 (ICD-10-CM) - Postoperative examination   G89.18 (ICD-10-CM) - Pain following surgery or procedure   S93.401D (ICD-10-CM) - Rupture of ligament of ankle, right, subsequent encounter   S86.311D (ICD-10-CM) - Peroneal tendon rupture, right, subsequent encounter   M25.571, G89.29 (ICD-10-CM) - Chronic pain of right ankle     Outcome Measure:  LEFS 42/80      S: Pt reports pain 2/10 continues to feel \"pinchy\". O:   Time 203-309     Visit   3 Repeat outcome measure at mid point and end. P  ain See above      ROM Right:   AROM: 15° Dorsiflexion,  40° Plantarflexion, 16° Inversion, 16° Eversion      Modalities            Manual            Stretch      gastroc stretch seated 3 x 30s   te   Soleus stretch seated  3 x 30s   te   Exercise     Bike 7  minutes  te   Ankle 4 way motion     Ankle T band 2 x 10 BTB te   Ankle saucer  motion  20x  PF/DF, EV/INV, CW, CCW te   Ankle alphabet 2 x  HEP TE   SLS 30 sec each leg  Blue foam NMR   Ankle circles   x 10 CW, CCW ; HEP TE   Toe raises standing  2 x 10   te    calf raises standing  2 x 10   te   Step-ups - FWD X 20 B  6 inch step  ta   Step-ups - LAT up and over  X 20  6 inch step  ta   Step-ups - BWD X 20 R  6 inch step  ta     To improve balance for safe community and home ambulation    Resisted walk      FWD      BKWD      lat 1 x 10 each Cable system 60# = 15#   NMR   March     Side stepping     Step up fwd/lat     marching on foam   NMR   Heel to toe        A:  Tolerated well. Reports same pain following treatment as on arrival.     P: Continue with rehab plan.     Carmelina Felty, PTA

## 2021-08-17 ENCOUNTER — TREATMENT (OUTPATIENT)
Dept: PHYSICAL THERAPY | Age: 61
End: 2021-08-17
Payer: COMMERCIAL

## 2021-08-17 DIAGNOSIS — S86.311D PERONEAL TENDON RUPTURE, RIGHT, SUBSEQUENT ENCOUNTER: ICD-10-CM

## 2021-08-17 DIAGNOSIS — S93.401D RUPTURE OF LIGAMENT OF ANKLE, RIGHT, SUBSEQUENT ENCOUNTER: ICD-10-CM

## 2021-08-17 DIAGNOSIS — M25.571 CHRONIC PAIN OF RIGHT ANKLE: ICD-10-CM

## 2021-08-17 DIAGNOSIS — G89.18 PAIN FOLLOWING SURGERY OR PROCEDURE: Primary | ICD-10-CM

## 2021-08-17 DIAGNOSIS — G89.29 CHRONIC PAIN OF RIGHT ANKLE: ICD-10-CM

## 2021-08-17 PROCEDURE — 97530 THERAPEUTIC ACTIVITIES: CPT | Performed by: PHYSICAL THERAPIST

## 2021-08-17 PROCEDURE — 97110 THERAPEUTIC EXERCISES: CPT | Performed by: PHYSICAL THERAPIST

## 2021-08-17 PROCEDURE — 97112 NEUROMUSCULAR REEDUCATION: CPT | Performed by: PHYSICAL THERAPIST

## 2021-08-17 NOTE — PROGRESS NOTES
[facility]   Phone: [phone]             Fax: [phone]      Physical Therapy Daily Treatment Note    Date: 2021  Patient Name: Haley Cervantes  : 1960   MRN: [id number]  DOInjury:    DOSx: 2021   Referring Provider: No referring provider defined for this encounter. Medical Diagnosis:   Z09 (ICD-10-CM) - Postoperative examination   G89.18 (ICD-10-CM) - Pain following surgery or procedure   S93.401D (ICD-10-CM) - Rupture of ligament of ankle, right, subsequent encounter   S86.311D (ICD-10-CM) - Peroneal tendon rupture, right, subsequent encounter   M25.571, G89.29 (ICD-10-CM) - Chronic pain of right ankle     Outcome Measure:  LEFS 42/80      S: Pt reports pain 4/10 ; reports a burning pulling sensation today. O:   Time 8722- 0378      Visit   4 Repeat outcome measure at mid point and end. P  ain See above      ROM Right:   AROM: 15° Dorsiflexion,  40° Plantarflexion, 16° Inversion, 16° Eversion      Modalities            Manual            Stretch      gastroc stretch seated 3 x 30s   te   Soleus stretch seated  3 x 30s   te   Exercise     Bike 7  minutes  te   Ankle 4 way motion     Ankle T band 2 x 10 BTB te   Ankle saucer  motion  20x  PF/DF, EV/INV, CW, CCW te   Ankle alphabet 2 x  HEP TE   SLS 30 sec each leg  Blue foam NMR   Ankle circles  CW, CCW ; HEP TE   Toe raises standing  2 x 10   te    calf raises standing  2 x 10   te   Step-ups - FWD X 20 B  6 inch step  ta   Step-ups - LAT up and over  X 20  6 inch step  ta   Step-ups - BWD X 20 R  6 inch step  ta     To improve balance for safe community and home ambulation    Resisted walk      FWD      BKWD      lat 1 x 5 each Cable system 60# = 15#   NMR   March     Side stepping     Step up fwd/lat     marching on foam   NMR   Heel to toe        A:  Tolerated well. Resisted walking reps decreased due to time constraints. Reports slightly more pain end of session.      P: Continue with rehab plan.     Rosales [place], [place] [address]    Treatment Charges: Mins Units   Initial Evaluation     Re-Evaluation     Ther Exercise         TE 16 1   Manual Therapy     MT     Ther Activities        TA 12 1   Gait Training          GT     Neuro Re-education NR 12 1   Modalities     Non-Billable Service Time     Other     Total Time/Units 40 3

## 2021-08-23 ENCOUNTER — OFFICE VISIT (OUTPATIENT)
Dept: PODIATRY | Age: 61
End: 2021-08-23

## 2021-08-23 VITALS
SYSTOLIC BLOOD PRESSURE: 144 MMHG | BODY MASS INDEX: 32.61 KG/M2 | DIASTOLIC BLOOD PRESSURE: 78 MMHG | WEIGHT: 190 LBS | TEMPERATURE: 98.2 F

## 2021-08-23 DIAGNOSIS — Z09 POSTOPERATIVE EXAMINATION: Primary | ICD-10-CM

## 2021-08-23 PROCEDURE — 99024 POSTOP FOLLOW-UP VISIT: CPT | Performed by: PODIATRIST

## 2021-08-23 NOTE — PROGRESS NOTES
Postop Progress Note    Subjective pt in physical therapy    Madelyn Solorio presents to the office 3 months  following ankle sx . Eating a regular diet without difficulty. Bowel movements are normal. Patient reports wound healing well. Pain is controlled without any medications. Objective    Vitals:    08/23/21 0834   BP: (!) 144/78   Temp: 98.2 °F (36.8 °C)     General: alert, cooperative and no distress  Incision: healing well, no drainage, no erythema, no hernia, no seroma, no swelling, well approximated    Assessment    Doing well postoperatively. Plan   1. Continue any current medications  2. Wound care discussed  3. Wound/Incision: healing well  4. Disposition:   Limited activities. No heavy lifting. No strenuous exercise. May return to work on 8- with the following restrictions: no work involving right leg. pt may not stand more then 10 minutes must have sit down job  X 3 weeks      5. Diet: regular diet  6. Follow up: 2 weeks.            Electronically signed by Panchito Harper DPM on 8/23/2021 at 8:46 AM

## 2021-08-24 ENCOUNTER — TREATMENT (OUTPATIENT)
Dept: PHYSICAL THERAPY | Age: 61
End: 2021-08-24
Payer: COMMERCIAL

## 2021-08-24 DIAGNOSIS — G89.29 CHRONIC PAIN OF RIGHT ANKLE: ICD-10-CM

## 2021-08-24 DIAGNOSIS — M25.571 CHRONIC PAIN OF RIGHT ANKLE: ICD-10-CM

## 2021-08-24 DIAGNOSIS — G89.18 PAIN FOLLOWING SURGERY OR PROCEDURE: Primary | ICD-10-CM

## 2021-08-24 DIAGNOSIS — S93.401D RUPTURE OF LIGAMENT OF ANKLE, RIGHT, SUBSEQUENT ENCOUNTER: ICD-10-CM

## 2021-08-24 DIAGNOSIS — S86.311D PERONEAL TENDON RUPTURE, RIGHT, SUBSEQUENT ENCOUNTER: ICD-10-CM

## 2021-08-24 PROCEDURE — 97110 THERAPEUTIC EXERCISES: CPT | Performed by: PHYSICAL THERAPIST

## 2021-08-24 PROCEDURE — 97112 NEUROMUSCULAR REEDUCATION: CPT | Performed by: PHYSICAL THERAPIST

## 2021-08-24 PROCEDURE — 97530 THERAPEUTIC ACTIVITIES: CPT | Performed by: PHYSICAL THERAPIST

## 2021-08-24 NOTE — PROGRESS NOTES
[facility]   Phone: [phone]             Fax: [phone]      Physical Therapy Daily Treatment Note    Date: 2021  Patient Name: Aime Mccracken  : 1960   MRN: [id number]  DOInjury:    DOSx: 2021   Referring Provider: Magnolia Pascual DPM  [address], [address] [facility] Diagnosis:   X73 (ICD-10-CM) - Postoperative examination   G89.18 (ICD-10-CM) - Pain following surgery or procedure   S93.401D (ICD-10-CM) - Rupture of ligament of ankle, right, subsequent encounter   S86.311D (ICD-10-CM) - Peroneal tendon rupture, right, subsequent encounter   M25.571, G89.29 (ICD-10-CM) - Chronic pain of right ankle     Outcome Measure:  LEFS 42/80      S: Pt reports pain 3/10. O:   Time 1948-9624      Visit   5 Repeat outcome measure at mid point and end. P  ain See above      ROM Right:   AROM: 15° Dorsiflexion,  40° Plantarflexion, 16° Inversion, 16° Eversion      Modalities            Manual            Stretch      gastroc stretch seated 3 x 30s   te   Soleus stretch seated  3 x 30s   te   Exercise     Bike 5  minutes  te   Ankle 4 way motion     Ankle T band 2 x 10 BTB te   Ankle saucer  motion  20x  PF/DF, EV/INV, CW, CCW te   Ankle alphabet 2 x  HEP TE   SLS  Blue foam NMR   Ankle circles  CW, CCW ; HEP TE   Toe raises standing  2 x 10   te    calf raises standing  2 x 10   te   Step-ups - FWD X 20 B  6 inch step  ta   Step-ups - LAT up and over  X 20  6 inch step  ta   Step-ups - BWD X 20 R  6 inch step  ta     To improve balance for safe community and home ambulation    Resisted walk      FWD      BKWD      lat 1 x 5 each Cable system 60# = 15#   NMR   March     Side stepping     Step up fwd/lat     marching on foam   NMR   Heel to toe        A:  Tolerated well. P: Continue with rehab plan.     Marek Reid, [address] [facility] [id number]    Treatment Charges: Mins Units   Initial Evaluation     Re-Evaluation     Ther Exercise

## 2021-08-25 ENCOUNTER — TREATMENT (OUTPATIENT)
Dept: PHYSICAL THERAPY | Age: 61
End: 2021-08-25
Payer: COMMERCIAL

## 2021-08-25 DIAGNOSIS — M25.571 CHRONIC PAIN OF RIGHT ANKLE: ICD-10-CM

## 2021-08-25 DIAGNOSIS — G89.18 PAIN FOLLOWING SURGERY OR PROCEDURE: Primary | ICD-10-CM

## 2021-08-25 DIAGNOSIS — G89.29 CHRONIC PAIN OF RIGHT ANKLE: ICD-10-CM

## 2021-08-25 DIAGNOSIS — S93.401D RUPTURE OF LIGAMENT OF ANKLE, RIGHT, SUBSEQUENT ENCOUNTER: ICD-10-CM

## 2021-08-25 DIAGNOSIS — S86.311D PERONEAL TENDON RUPTURE, RIGHT, SUBSEQUENT ENCOUNTER: ICD-10-CM

## 2021-08-25 PROCEDURE — 97110 THERAPEUTIC EXERCISES: CPT | Performed by: PHYSICAL THERAPIST

## 2021-08-25 PROCEDURE — 97530 THERAPEUTIC ACTIVITIES: CPT | Performed by: PHYSICAL THERAPIST

## 2021-08-25 NOTE — PROGRESS NOTES
[facility]   Phone: [phone]             Fax: [phone]      Physical Therapy Daily Treatment Note    Date: 2021  Patient Name: Crystal Hart  : 1960   MRN: [id number]  DOInjury:    DOSx: 2021   Referring Provider: No referring provider defined for this encounter. Medical Diagnosis:   Z09 (ICD-10-CM) - Postoperative examination   G89.18 (ICD-10-CM) - Pain following surgery or procedure   S93.401D (ICD-10-CM) - Rupture of ligament of ankle, right, subsequent encounter   S86.311D (ICD-10-CM) - Peroneal tendon rupture, right, subsequent encounter   M25.571, G89.29 (ICD-10-CM) - Chronic pain of right ankle     Outcome Measure:  LEFS 42/80      S: Pt reports pain 3/10. O:   Time 2121 - 8731     Visit   6 Repeat outcome measure at mid point and end. P  ain See above      ROM Right:   AROM: 15° Dorsiflexion,  40° Plantarflexion, 16° Inversion, 16° Eversion      Modalities            Manual            Stretch      gastroc stretch seated 3 x 30s   te   Soleus stretch seated  3 x 30s   te   Exercise     Bike 10  minutes  te   Ankle 4 way motion     Ankle T band 2 x 10 BTB te   Ankle saucer  motion  20x  PF/DF, EV/INV, CW, CCW te   Ankle alphabet 2 x  HEP TE   SLS 30 sec x 3 Blue foam NMR   Ankle circles  CW, CCW ; HEP TE   Toe raises standing  2 x 10   te    calf raises standing  2 x 10   te   Step-ups - FWD X 20 B  6 inch step  ta   Step-ups - LAT up and over  X 20  6 inch step  ta   Step-ups - BWD X 20 R  6 inch step  ta     To improve balance for safe community and home ambulation    Resisted walk      FWD      BKWD      lat Cable system 60# = 15#   NMR   March     Side stepping     Step up fwd/lat     marching on foam   NMR   Heel to toe        A:  Tolerated well. P: Continue with rehab plan.     Arpita Odell, [address] [address]    Treatment Charges: Mins Units   Initial Evaluation     Re-Evaluation     Ther Exercise         TE 27 2   Manual Therapy     MT     Ther Activities        TA 12 1   Gait Training          GT     Neuro Re-education NR 2    Modalities     Non-Billable Service Time     Other     Total Time/Units 41 3

## 2021-08-30 ENCOUNTER — TREATMENT (OUTPATIENT)
Dept: PHYSICAL THERAPY | Age: 61
End: 2021-08-30
Payer: COMMERCIAL

## 2021-08-30 DIAGNOSIS — G89.18 PAIN FOLLOWING SURGERY OR PROCEDURE: Primary | ICD-10-CM

## 2021-08-30 PROCEDURE — 97110 THERAPEUTIC EXERCISES: CPT

## 2021-08-30 PROCEDURE — 97530 THERAPEUTIC ACTIVITIES: CPT

## 2021-08-30 NOTE — PROGRESS NOTES
plan.    David Zhang, PTA [address]    Treatment Charges: Mins Units   Initial Evaluation     Re-Evaluation     Ther Exercise         TE 30 2   Manual Therapy     MT     Ther Activities        TA 10 1   Gait Training          GT     Neuro Re-education NR     Modalities     Non-Billable Service Time     Other     Total Time/Units 40 3

## 2021-08-31 ENCOUNTER — TREATMENT (OUTPATIENT)
Dept: PHYSICAL THERAPY | Age: 61
End: 2021-08-31
Payer: COMMERCIAL

## 2021-08-31 DIAGNOSIS — G89.29 CHRONIC PAIN OF RIGHT ANKLE: ICD-10-CM

## 2021-08-31 DIAGNOSIS — M25.571 CHRONIC PAIN OF RIGHT ANKLE: ICD-10-CM

## 2021-08-31 DIAGNOSIS — S93.401D RUPTURE OF LIGAMENT OF ANKLE, RIGHT, SUBSEQUENT ENCOUNTER: ICD-10-CM

## 2021-08-31 DIAGNOSIS — S86.311D PERONEAL TENDON RUPTURE, RIGHT, SUBSEQUENT ENCOUNTER: ICD-10-CM

## 2021-08-31 DIAGNOSIS — G89.18 PAIN FOLLOWING SURGERY OR PROCEDURE: Primary | ICD-10-CM

## 2021-08-31 PROCEDURE — 97164 PT RE-EVAL EST PLAN CARE: CPT | Performed by: PHYSICAL THERAPIST

## 2021-08-31 PROCEDURE — 97110 THERAPEUTIC EXERCISES: CPT | Performed by: PHYSICAL THERAPIST

## 2021-08-31 NOTE — PROGRESS NOTES
[address] [facility]   Phone: [phone]   Fax: [phone]        Referring Provider: Nadine Mckeon DPM  [address], [address] [facility] Diagnosis:   Z95 (ICD-10-CM) - Postoperative examination   G89.18 (ICD-10-CM) - Pain following surgery or procedure   S93.401D (ICD-10-CM) - Rupture of ligament of ankle, right, subsequent encounter   S86.311D (ICD-10-CM) - Peroneal tendon rupture, right, subsequent encounter   M25.571, G89.29 (ICD-10-CM) - Chronic pain of right ankle         CERTIFICATION PERIOD:  8/3/2021 thru 9/17/2021    ATTENDANCE:  Patient has attended 8 of 8 scheduled treatments from 08/09/2021  to 8/31/2021. TREATMENTS RECEIVED:  Therapeutic exercise, therapeutic activity     INITIAL STATUS:  Observations: well nourished female                Edema:   R figure 8: 52 cm   L figure 8: 51 cm      Gait: antalgic, altered with short step length, width, height but improving      Joint/Motion:     Ankle:  Right:   AROM: 15° Dorsiflexion,  40° Plantarflexion, 16° Inversion, 16° Eversion      Left:   AROM: 20° Dorsiflexion,  50° Plantarflexion, 35° Inversion, 25° Eversion        Strength:     Ankle:  Right: Dorsiflexion 4+/5, Plantarflexion 4+/5, Inversion 4+/5, Eversion 4+/5  Left: Dorsiflexion 5/5, Plantarflexion 5/5, Inversion 5/5, Eversion 5/5     Palpation: Tender to palpation around area of incisions       Special Tests/Functional Screens: N/T d/t postop  []? ? Anterior Drawer []? ?+ / []? ? -  []? ? Eversion Stress: []??+ / []? ? -  []? ? Butler Test []? ?+ / []? ? -             []? ? Tib-Fib Compression Test []? ?+ / []? ? -     []? ? Inversion Stress []? ?+ / []? ? -     []? ? Squeeze Test []? ?+ / []? ? -   []? ? Marie's Sign []? ?+ / []? ? -   []? ? Other: []??+ / []? ?           CURRENT STATUS:  Observations: well nourished female                Edema:   R figure 8: 53 cm   L figure 8: 50.5 cm      Gait: antalgic, altered with short step length, width, height but improving      Joint/Motion:     Ankle:  Right:   AROM: 20° Dorsiflexion,  40° Plantarflexion, 20° Inversion, 20 ° Eversion      Left:   AROM: 20° Dorsiflexion,  50° Plantarflexion, 35° Inversion, 25° Eversion        Strength:     Ankle:  Right: Dorsiflexion 5/5, Plantarflexion 5/5, Inversion 4+/5, Eversion 4+/5  Left: Dorsiflexion 5/5, Plantarflexion 5/5, Inversion 5/5, Eversion 5/5     Palpation: Tender to palpation around area of incisions       Special Tests/Functional Screens: N/T d/t postop  []? ? Anterior Drawer []? ?+ / []? ? -  []? ? Eversion Stress: []??+ / []? ? -  []? ? Butler Test []? ?+ / []? ? -             []? ? Tib-Fib Compression Test []? ?+ / []? ? -     []? ? Inversion Stress []? ?+ / []? ? -     []? ? Squeeze Test []? ?+ / []? ? -   []? ? Marie's Sign []? ?+ / []? ? -   []? ? Other: []??+ / []? ?        Short Term goals (3 weeks)  · Decrease reported pain to 0-6/10 (goal met)   · Increase ROM to AROM: 15° Dorsiflexion,  45° Plantarflexion, 20° Inversion, 20° Eversion (goal met)  · Increase Strength to 4+/5 (goal  Met)   · Able to perform/complete the following functions/tasks: pt able to perform 10 sit to stands with 1 UE support with minor pain/limitation.  Pt able to go up/down 5 steps with 1 rail with minor pain/limitation.  Pt able to walk 20 minutes with minor pain/limitation.  (goal met)  · Lower Extremity Functional Scale (LEFS) 50/80 impairment (not met)     Long Term goals (6 weeks)  · Decrease reported pain to 0-3/10 (not met)  · Increase ROM to AROM: 20° Dorsiflexion,  50° Plantarflexion, 35° Inversion, 25° Eversion (not met)  · Increase strength to 5/5 (partial goal met)  · Able to complete the following functions/tasks: pt able to perform 10 sit to stands without UE support with no pain/limitation.  Pt able to go up/down flight of steps with no pain/limitation.  Pt able to walk 30+ minutes with no pain/limitation.  (partial goal met)  · LEFS 60/80 impairment (not met)  · Independent with home exercise program (HEP) (goal met)           OUTCOME MEASURE:  LEFS 46/80     COMMENTS AND RECOMMENDATIONS:   Pt has met majority of short term goals and making slow progress toward long term goals. Pt reports goes back to work tomorrow. 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 Pt able to complete sit to stands in session without UE support and able to perform steps ups in session to simulate flight of stairs. Pt does continue to c/o pain with activity. Small reddened area noted on foot. Pt reports doctor has already looked at it. Discussed with pt recommend continue with HEP at home and call with any questions. Pt demonstrates understanding. Will discharge pt at this time. Thank you for the opportunity to work with your patient. Nelda Plata, PT DPT [id number]    I CERTIFY THAT THE ABOVE REASSESSMENT AND PLAN OF CARE FOR PHYSICAL THERAPY SERVICES ARE APPROPRIATE AND MEDICALLY NECESSARY.         ________________________                _______________  Physician     Date

## 2021-08-31 NOTE — PROGRESS NOTES
[facility]   Phone: [phone]             Fax: [phone]      Physical Therapy Daily Treatment Note    Date: 2021  Patient Name: Julita Salinas  : 1960   MRN: [id number]  DOInjury:    DOSx: 2021   Referring Provider: Naveed Nicole DPM  [address], [address] [facility] Diagnosis:   N83 (ICD-10-CM) - Postoperative examination   G89.18 (ICD-10-CM) - Pain following surgery or procedure   S93.401D (ICD-10-CM) - Rupture of ligament of ankle, right, subsequent encounter   S86.311D (ICD-10-CM) - Peroneal tendon rupture, right, subsequent encounter   M25.571, G89.29 (ICD-10-CM) - Chronic pain of right ankle     Outcome Measure:  LEFS 46/80      S: Pt reports pain in R ankle no number provided. O:   Time 3705-1564     Visit   8 Repeat outcome measure at mid point and end. P  ain See above      ROM Right:   AROM: 20° Dorsiflexion,  40° Plantarflexion, 20° Inversion, 20 ° Eversion      Modalities            Manual            Stretch      gastroc stretch seated  te   Soleus stretch seated   te   Exercise     Bike 10  minutes  te   Ankle 4 way motion     Ankle T band BTB te   Ankle saucer  motion PF/DF, EV/INV, CW, CCW te   Ankle alphabet HEP TE   SLS Blue foam NMR   Ankle circles  CW, CCW ; HEP TE   Toe raises standing   te    calf raises standing   te   Step-ups - FWD 8 inch step  ta   Step-ups - LAT up and over  8 inch step  ta   Step-ups - BWD 8 inch step  ta     To improve balance for safe community and home ambulation    Resisted walk      FWD      BKWD      lat Cable system 60# = 15#   NMR   March     Side stepping     Step up fwd/lat     marching on foam   NMR   Heel to toe        A:  Tolerated well. Reassessment completed today. See note for details. Recommend continue with HEP at home. P: Will discharge pt at this time.       Ramone Altman [place] [id number]    Treatment Charges: Mins Units   Initial Evaluation Re-Evaluation 24 1   Ther Exercise         TE 10 1   Manual Therapy     MT     Ther Activities        TA     Gait Training          GT     Neuro Re-education NR     Modalities     Non-Billable Service Time     Other     Total Time/Units 34 2

## 2021-09-13 ENCOUNTER — OFFICE VISIT (OUTPATIENT)
Dept: PODIATRY | Age: 61
End: 2021-09-13

## 2021-09-13 VITALS
BODY MASS INDEX: 32.61 KG/M2 | WEIGHT: 190 LBS | TEMPERATURE: 97.9 F | DIASTOLIC BLOOD PRESSURE: 82 MMHG | SYSTOLIC BLOOD PRESSURE: 140 MMHG

## 2021-09-13 DIAGNOSIS — Z09 POSTOPERATIVE EXAMINATION: Primary | ICD-10-CM

## 2021-09-13 PROCEDURE — 99024 POSTOP FOLLOW-UP VISIT: CPT | Performed by: PODIATRIST

## 2021-09-13 NOTE — PROGRESS NOTES
Postop Progress Note    Subjective pt in physical therapy    Burna Cockayne presents to the office  4  months  following ankle sx . Eating a regular diet without difficulty. Bowel movements are normal. Patient reports wound healing well. Pain is controlled without any medications. Objective    Vitals:    09/13/21 1602   BP: (!) 140/82   Temp: 97.9 °F (36.6 °C)     General: alert, cooperative and no distress  Incision: healing well, no drainage, no erythema, no hernia, no seroma, no swelling, well approximated    Assessment    Doing well postoperatively. Plan   1. Continue any current medications  2. Wound care discussed  3. Wound/Incision: healing well  4. Disposition:   Limited activities. No heavy lifting. 5. Diet: regular diet  6. Follow up: 6 weeks. Work restrictions  Limited  Duty    Postop Progress Note    Subjective pt in physical therapy    Burna Cockayne presents to the office 3 months  following ankle sx . Eating a regular diet without difficulty. Bowel movements are normal. Patient reports wound healing well. Pain is controlled without any medications. Objective    Vitals:    09/13/21 1602   BP: (!) 140/82   Temp: 97.9 °F (36.6 °C)     General: alert, cooperative and no distress  Incision: healing well, no drainage, no erythema, no hernia, no seroma, no swelling, well approximated    Assessment    Doing well postoperatively. Plan   1. Continue any current medications  2. Wound care discussed  3. Wound/Incision: healing well  4. Disposition:   Limited activities. No heavy lifting. No strenuous exercise. pt may not stand more then 10 minutes must have sit down job       X 3 weeks      5. Diet: regular diet  6. Follow up: 6 weeks.            Electronically signed by Yakov Faulkner DPM on 9/13/2021 at 4:09 PM            Electronically signed by Yakov Faulkner DPM on 9/13/2021 at 4:09 PM

## 2021-09-13 NOTE — LETTER
[address]  [address]   Phone: [phone]  Fax: [facility] [place] [place]        September 13, 2021     Patient: Clifford Corley   YOB: 1960   Date of Visit: 9/13/2021       To Whom it May Concern:    Trupti Mayo was seen in my clinic on 9/13/2021. 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 Artemus Osgood until further notice. Next visit on on 10/25/21 will reevaluate then      If you have any questions or concerns, please don't hesitate to call.     Sincerely,         Antionette Tse DPM

## 2021-10-25 ENCOUNTER — OFFICE VISIT (OUTPATIENT)
Dept: PODIATRY | Age: 61
End: 2021-10-25
Payer: COMMERCIAL

## 2021-10-25 VITALS
BODY MASS INDEX: 32.61 KG/M2 | WEIGHT: 190 LBS | TEMPERATURE: 98.2 F | SYSTOLIC BLOOD PRESSURE: 136 MMHG | DIASTOLIC BLOOD PRESSURE: 78 MMHG

## 2021-10-25 DIAGNOSIS — Z09 POSTOPERATIVE EXAMINATION: Primary | ICD-10-CM

## 2021-10-25 PROCEDURE — 99212 OFFICE O/P EST SF 10 MIN: CPT | Performed by: PODIATRIST

## 2021-10-25 RX ORDER — METHYLPREDNISOLONE 4 MG/1
TABLET ORAL
Qty: 21 TABLET | Refills: 0 | Status: SHIPPED | OUTPATIENT
Start: 2021-10-25 | End: 2021-10-31

## 2021-11-29 ENCOUNTER — OFFICE VISIT (OUTPATIENT)
Dept: PODIATRY | Age: 61
End: 2021-11-29
Payer: COMMERCIAL

## 2021-11-29 VITALS
SYSTOLIC BLOOD PRESSURE: 130 MMHG | BODY MASS INDEX: 32.61 KG/M2 | WEIGHT: 190 LBS | DIASTOLIC BLOOD PRESSURE: 72 MMHG | TEMPERATURE: 98.2 F

## 2021-11-29 DIAGNOSIS — Z09 POSTOPERATIVE EXAMINATION: Primary | ICD-10-CM

## 2021-11-29 PROCEDURE — 99212 OFFICE O/P EST SF 10 MIN: CPT | Performed by: PODIATRIST

## 2021-11-29 NOTE — LETTER
[address] [place] [address] [address] [place]   Phone: [phone]  Fax: [phone]    Rommel Checo        November 29, 2021     Patient: Heidi García   YOB: 1960   Date of Visit: 11/29/2021       To Whom it May Concern:    Joby Russell was seen in my clinic on 11/29/2021. She is to continue light duty until next appointment in Jan. 2022    If you have any questions or concerns, please don't hesitate to call.     Sincerely,         Miguelangel Ng DPM

## 2021-11-29 NOTE — PROGRESS NOTES
Postop Progress Note    Subjective pt in physical therapy    Ben Arias presents to the office  6  months  following ankle sx . Eating a regular diet without difficulty. Bowel movements are normal. Patient reports wound healing well. Pain is controlled without any medications. Objective    Vitals:    11/29/21 1628   BP: 130/72   Temp: 98.2 °F (36.8 °C)     General: alert, cooperative and no distress  Incision: healing well, no drainage, no erythema, no hernia, no seroma, no swelling, well approximated    Assessment    Doing well postoperatively. Plan   1. Continue any current medications  2. Wound care discussed  3. Wound/Incision: healing well  4. Disposition:   Limited activities. No heavy lifting. 5. Diet: regular diet  6. Follow up: 8 weeks.      Work restrictions  Limited  Duty      Electronically signed by Amairani Rey DPM on 11/29/2021 at 4:40 PM            Electronically signed by Amairani Rey DPM on 11/29/2021 at 4:40 PM

## 2022-01-03 ENCOUNTER — OFFICE VISIT (OUTPATIENT)
Dept: PODIATRY | Age: 62
End: 2022-01-03

## 2022-01-03 VITALS
WEIGHT: 190 LBS | DIASTOLIC BLOOD PRESSURE: 80 MMHG | TEMPERATURE: 97.6 F | SYSTOLIC BLOOD PRESSURE: 138 MMHG | BODY MASS INDEX: 32.61 KG/M2

## 2022-01-03 DIAGNOSIS — Z09 POSTOPERATIVE EXAMINATION: Primary | ICD-10-CM

## 2022-01-03 PROCEDURE — 99024 POSTOP FOLLOW-UP VISIT: CPT | Performed by: PODIATRIST

## 2022-01-03 NOTE — PROGRESS NOTES
Postop Progress Note    Subjective pt in physical therapy    Mary Frye presents to the office  7  months  following ankle sx . Eating a regular diet without difficulty. Bowel movements are normal. Patient reports wound healing well. Pain is controlled without any medications. Objective    Vitals:    01/03/22 1607   BP: 138/80   Temp: 97.6 °F (36.4 °C)     General: alert, cooperative and no distress  Incision: healing well, no drainage, no erythema, no hernia, no seroma, no swelling, well approximated    Assessment    Doing well postoperatively. Plan   1. Continue any current medications  2. Wound care discussed  3. Wound/Incision: healing well  4. Disposition:   Limited activities. No heavy lifting. 5. Diet: regular diet  6. Follow up: 8 weeks.      Work restrictions  Limited  Duty  Same  Work restrictions     Electronically signed by Em Mahoney DPM on 1/3/2022 at 4:17 PM            Electronically signed by Em Mahoney DPM on 1/3/2022 at 4:17 PM

## 2022-01-03 NOTE — LETTER
[address] [place] [address]  Diego [place]isano   Phone: [phone]  Fax: [facility] [place]        January 3, 2022     Patient: Rudolph Izquierdo   YOB: 1960   Date of Visit: 1/3/2022       To Whom it May Concern:     Gosiasusan Ok was seen in my clinic on 1/3/2022. She is to continue light duty until next appointment on 2/14/2022    If you have any questions or concerns, please don't hesitate to call.     Sincerely,         Tammi Baker DPM

## 2022-02-14 ENCOUNTER — OFFICE VISIT (OUTPATIENT)
Dept: PODIATRY | Age: 62
End: 2022-02-14
Payer: COMMERCIAL

## 2022-02-14 VITALS
DIASTOLIC BLOOD PRESSURE: 74 MMHG | SYSTOLIC BLOOD PRESSURE: 139 MMHG | TEMPERATURE: 98.2 F | BODY MASS INDEX: 32.61 KG/M2 | WEIGHT: 190 LBS

## 2022-02-14 DIAGNOSIS — G89.18 PAIN FOLLOWING SURGERY OR PROCEDURE: ICD-10-CM

## 2022-02-14 DIAGNOSIS — Z09 POSTOPERATIVE EXAMINATION: Primary | ICD-10-CM

## 2022-02-14 PROCEDURE — 99212 OFFICE O/P EST SF 10 MIN: CPT | Performed by: PODIATRIST

## 2022-02-14 NOTE — PROGRESS NOTES
1996     Years since quittin.8    Smokeless tobacco: Never Used   Vaping Use    Vaping Use: Never used   Substance and Sexual Activity    Alcohol use: Not Currently    Drug use: Never    Sexual activity: Not on file   Other Topics Concern    Not on file   Social History Narrative    Not on file     Social Determinants of Health     Financial Resource Strain:     Difficulty of Paying Living Expenses: Not on file   Food Insecurity:     Worried About Running Out of Food in the Last Year: Not on file    Hiren of Food in the Last Year: Not on file   Transportation Needs:     Lack of Transportation (Medical): Not on file    Lack of Transportation (Non-Medical): Not on file   Physical Activity:     Days of Exercise per Week: Not on file    Minutes of Exercise per Session: Not on file   Stress:     Feeling of Stress : Not on file   Social Connections:     Frequency of Communication with Friends and Family: Not on file    Frequency of Social Gatherings with Friends and Family: Not on file    Attends Oriental orthodox Services: Not on file    Active Member of [address] [place] [address] [employer] or Organizations: Not on file    Attends Club or Organization Meetings: Not on file    Marital Status: Not on file   Intimate Partner Violence:     Fear of Current or Ex-Partner: Not on file    Emotionally Abused: Not on file    Physically Abused: Not on file    Sexually Abused: Not on file   Housing Stability:     Unable to Pay for Housing in the Last Year: Not on file    Number of [place] in the Last Year: Not on file    Unstable Housing in the Last Year: Not on file       Vitals:    22 1630   BP: 139/74   Temp: 98.2 °F (36.8 °C)   TempSrc: Temporal   Weight: 190 lb (86.2 kg)       Focused Lower Extremity Physical Exam:  Vitals:    22 1630   BP: 139/74   Temp: 98.2 °F (36.8 °C)        Foot Exam     Left Ankle Exam     Tenderness   The patient is experiencing tenderness in the ATF.    Swelling: mild    Range of Motion   Dorsiflexion: abnormal   Plantar flexion: abnormal   Eversion: abnormal   Inversion: abnormal             Vascular: pulses  dp  pt    Capillary Refill Time:   Hair growth  Skin:    Edema:    Neurologic:      Musculoskeletal/ Orthopedic examination:  Pain   Inferior  Aspect  Right ankle    NAIL   Web space   Derm:         Assessment and Plan: No pain wrapping every other day possible excision of suture in April. Stewart Segura was seen today for post-op check. Diagnoses and all orders for this visit:    Postoperative examination    Pain following surgery or procedure        No follow-ups on file. Seen By:  Kari Brady DPM      Document was created using voice recognition software. Note was reviewed, however may contain grammatical errors.

## 2022-03-18 ENCOUNTER — OFFICE VISIT (OUTPATIENT)
Dept: PODIATRY | Age: 62
End: 2022-03-18
Payer: COMMERCIAL

## 2022-03-18 VITALS
DIASTOLIC BLOOD PRESSURE: 80 MMHG | TEMPERATURE: 98.2 F | WEIGHT: 186 LBS | BODY MASS INDEX: 31.93 KG/M2 | SYSTOLIC BLOOD PRESSURE: 130 MMHG

## 2022-03-18 DIAGNOSIS — M79.5 FOREIGN BODY (FB) IN SOFT TISSUE: Primary | ICD-10-CM

## 2022-03-18 PROCEDURE — 10120 INC&RMVL FB SUBQ TISS SMPL: CPT | Performed by: PODIATRIST

## 2022-03-18 NOTE — PROGRESS NOTES
3/18/22  Billie Solorzano : 1960 Sex: female  Age: 62 y.o. Patient was referred by [id number] [facility],     Chief Complaint   Patient presents with    Post-Op Check     post op right foot sx 2021       SUBJECTIVE patient is seen today for excision of a painful suture right ankle. HPI  Review of Systems  Const: Denies constitutional symptoms  Musculo: Denies symptoms other than stated above  Skin: Denies symptoms other than stated above     No current outpatient medications on file. Allergies   Allergen Reactions    Latex      Leaves marks on skin    Aspirin Hives and Shortness Of Breath     sick  Other reaction(s): Disease type AND/OR category unknown (finding), GI Upset    Codeine Hives and Shortness Of Breath     fatique  Other reaction(s): Disease type AND/OR category unknown (finding)    Penicillins Hives and Shortness Of Breath     swelling  Other reaction(s): Disease type AND/OR category unknown (finding), GI Upset  headaches      Soap Hives and Shortness Of Breath    Bee Venom      Other reaction(s): Disease type AND/OR category unknown (finding)    Sulfa Antibiotics      Family history        Past Medical History:   Diagnosis Date    Asthma     Right foot pain      Past Surgical History:   Procedure Laterality Date    ANKLE SURGERY Right 2021    REPAIR ANTERIOR TALAR FIBULAR LIGAMENT RIGHT FOOT REPAIR PERONEAL TENDON RIGHT FOOT, SURAL NERVE LYSIS  W PRP performed by Arianna Engle DPM at [address] [place] [address] Right     Tendon repair    HYSTERECTOMY      KNEE SURGERY Bilateral     MENISCECTOMY Right      No family history on file.   Social History     Socioeconomic History    Marital status:      Spouse name: Not on file    Number of children: Not on file    Years of education: Not on file    Highest education level: Not on file   Occupational History    Not on file   Tobacco Use    Smoking status: Former Smoker     Types: Cigarettes Quit date: 1996     Years since quittin.9    Smokeless tobacco: Never Used   Vaping Use    Vaping Use: Never used   Substance and Sexual Activity    Alcohol use: Not Currently    Drug use: Never    Sexual activity: Not on file   Other Topics Concern    Not on file   Social History Narrative    Not on file     Social Determinants of Health     Financial Resource Strain:     Difficulty of Paying Living Expenses: Not on file   Food Insecurity:     Worried About Running Out of Food in the Last Year: Not on file    Hiren of Food in the Last Year: Not on file   Transportation Needs:     Lack of Transportation (Medical): Not on file    Lack of Transportation (Non-Medical):  Not on file   Physical Activity:     Days of Exercise per Week: Not on file    Minutes of Exercise per Session: Not on file   Stress:     Feeling of Stress : Not on file   Social Connections:     Frequency of Communication with Friends and Family: Not on file    Frequency of Social Gatherings with Friends and Family: Not on file    Attends Muslim Services: Not on file    Active Member of [address] [place] [address] or Organizations: Not on file    Attends Club or Organization Meetings: Not on file    Marital Status: Not on file   Intimate Partner Violence:     Fear of Current or Ex-Partner: Not on file    Emotionally Abused: Not on file    Physically Abused: Not on file    Sexually Abused: Not on file   Housing Stability:     Unable to Pay for Housing in the Last Year: Not on file    Number of [place] in the Last Year: Not on file    Unstable Housing in the Last Year: Not on file       Vitals:    22 1633   BP: 130/80   Temp: 98.2 °F (36.8 °C)   TempSrc: Temporal   Weight: 186 lb (84.4 kg)       Focused Lower Extremity Physical Exam:  Vitals:    22 1633   BP: 130/80   Temp: 98.2 °F (36.8 °C)        Foot Exam     Ortho Exam    Vascular: pulses  dp  pt    Capillary Refill Time:   Hair growth  Skin: Edema:    Neurologic:      Musculoskeletal/ Orthopedic examination:    NAIL  Web space   Derm: The right ankle at the anterior aspect of the fibula there is a suture. Subcuticular that is painful noninfected. Assessment and Plan: Surgical procedure today preop diagnosis was painful suture postop diagnosis same Surgeon Dr. Joan Platt right ankle where the right foot was prepped not in usual aseptic fashion 1 cc of 1% lidocaine plain was infiltrated underneath the plantar suture. Using a 15 blade a stab incision was made over the suture x-ray stat and 15 blade was used to excise the suture there is no signs of drainage or redness or infection. 1 suture of 3-0 nylon simple ruptured a postop dressing was applied patient tolerated the anesthesia procedure well with no complications patient will be seen in 7 days for suture removal.  Was advised to keep the dressing clean dry intact comfortable shoe. Miguel Marie was seen today for post-op check. Diagnoses and all orders for this visit:    Foreign body (FB) in soft tissue        No follow-ups on file. Seen By:  Svetlana Paul DPM      Document was created using voice recognition software. Note was reviewed, however may contain grammatical errors.

## 2022-03-26 ENCOUNTER — OFFICE VISIT (OUTPATIENT)
Dept: PODIATRY | Age: 62
End: 2022-03-26

## 2022-03-26 VITALS — BODY MASS INDEX: 31.93 KG/M2 | TEMPERATURE: 97.7 F | WEIGHT: 186 LBS

## 2022-03-26 DIAGNOSIS — Z09 POSTOPERATIVE EXAMINATION: Primary | ICD-10-CM

## 2022-03-26 PROCEDURE — 99024 POSTOP FOLLOW-UP VISIT: CPT | Performed by: PODIATRIST

## 2022-03-26 NOTE — PROGRESS NOTES
Postop Progress Note    Subjective    Mary Frye presents to the office 8 days  following ankle. Eating a regular diet without difficulty. Bowel movements are normal. Patient reports wound healing well. The patient is not having any pain. Objective    Vitals:    03/26/22 0654   Temp: 97.7 °F (36.5 °C)     General: alert, cooperative and no distress  Incision: healing well, no drainage, no erythema, no hernia, no seroma, no swelling, well approximated    Assessment    Doing well postoperatively. Plan   1. Continue any current medications  2. Wound care discussed  3. Wound/Incision: healing well, no drainage, no erythema, no hernia, no seroma, no swelling, well approximated, sutures removed  4. Disposition:   Normal activities with no limitations. Should not return to gym class or sports until cleared by a physician. 5. Diet: encourage fluids  6. Follow up: 5 weeks.            Electronically signed by Em Mahoney DPM on 3/26/2022 at 7:09 AM

## 2022-04-13 ENCOUNTER — TELEPHONE (OUTPATIENT)
Dept: FAMILY MEDICINE CLINIC | Age: 62
End: 2022-04-13

## 2022-04-13 NOTE — TELEPHONE ENCOUNTER
----- Message from Virginia Padilla DO sent at 4/11/2022  4:02 PM EDT -----  Patient needs follow-up appointment or to let us know if she is following with a different physician.

## 2022-05-02 ENCOUNTER — OFFICE VISIT (OUTPATIENT)
Dept: PODIATRY | Age: 62
End: 2022-05-02
Payer: COMMERCIAL

## 2022-05-02 VITALS — BODY MASS INDEX: 31.93 KG/M2 | WEIGHT: 186 LBS | TEMPERATURE: 97.7 F

## 2022-05-02 DIAGNOSIS — M79.671 PAIN IN RIGHT FOOT: Primary | ICD-10-CM

## 2022-05-02 DIAGNOSIS — G57.81 DISORDER OF RIGHT SURAL NERVE: ICD-10-CM

## 2022-05-02 PROCEDURE — 99213 OFFICE O/P EST LOW 20 MIN: CPT | Performed by: PODIATRIST

## 2022-05-03 ENCOUNTER — OFFICE VISIT (OUTPATIENT)
Dept: FAMILY MEDICINE CLINIC | Age: 62
End: 2022-05-03
Payer: COMMERCIAL

## 2022-05-03 VITALS
BODY MASS INDEX: 32.27 KG/M2 | WEIGHT: 189 LBS | SYSTOLIC BLOOD PRESSURE: 134 MMHG | HEART RATE: 82 BPM | DIASTOLIC BLOOD PRESSURE: 84 MMHG | TEMPERATURE: 98 F | HEIGHT: 64 IN | OXYGEN SATURATION: 98 %

## 2022-05-03 DIAGNOSIS — Z00.00 ENCOUNTER FOR WELL ADULT EXAM WITHOUT ABNORMAL FINDINGS: Primary | ICD-10-CM

## 2022-05-03 DIAGNOSIS — Z13.6 SCREENING FOR CARDIOVASCULAR CONDITION: ICD-10-CM

## 2022-05-03 DIAGNOSIS — Z00.00 ENCOUNTER FOR WELL ADULT EXAM WITHOUT ABNORMAL FINDINGS: ICD-10-CM

## 2022-05-03 DIAGNOSIS — Z12.12 SCREENING FOR MALIGNANT NEOPLASM OF THE RECTUM: ICD-10-CM

## 2022-05-03 LAB
BASOPHILS ABSOLUTE: 0.1 E9/L (ref 0–0.2)
BASOPHILS RELATIVE PERCENT: 1.6 % (ref 0–2)
CREATININE URINE: 56 MG/DL (ref 29–226)
EOSINOPHILS ABSOLUTE: 0.23 E9/L (ref 0.05–0.5)
EOSINOPHILS RELATIVE PERCENT: 3.8 % (ref 0–6)
HBA1C MFR BLD: 5.4 % (ref 4–5.6)
HCT VFR BLD CALC: 41.6 % (ref 34–48)
HEMOGLOBIN: 13.5 G/DL (ref 11.5–15.5)
IMMATURE GRANULOCYTES #: 0.01 E9/L
IMMATURE GRANULOCYTES %: 0.2 % (ref 0–5)
LYMPHOCYTES ABSOLUTE: 2.04 E9/L (ref 1.5–4)
LYMPHOCYTES RELATIVE PERCENT: 33.3 % (ref 20–42)
MCH RBC QN AUTO: 28.7 PG (ref 26–35)
MCHC RBC AUTO-ENTMCNC: 32.5 % (ref 32–34.5)
MCV RBC AUTO: 88.5 FL (ref 80–99.9)
MICROALBUMIN UR-MCNC: <12 MG/L
MICROALBUMIN/CREAT UR-RTO: ABNORMAL (ref 0–30)
MONOCYTES ABSOLUTE: 0.5 E9/L (ref 0.1–0.95)
MONOCYTES RELATIVE PERCENT: 8.2 % (ref 2–12)
NEUTROPHILS ABSOLUTE: 3.25 E9/L (ref 1.8–7.3)
NEUTROPHILS RELATIVE PERCENT: 52.9 % (ref 43–80)
PDW BLD-RTO: 13 FL (ref 11.5–15)
PLATELET # BLD: 218 E9/L (ref 130–450)
PMV BLD AUTO: 11.7 FL (ref 7–12)
RBC # BLD: 4.7 E12/L (ref 3.5–5.5)
WBC # BLD: 6.1 E9/L (ref 4.5–11.5)

## 2022-05-03 PROCEDURE — G0447 BEHAVIOR COUNSEL OBESITY 15M: HCPCS | Performed by: FAMILY MEDICINE

## 2022-05-03 PROCEDURE — 99396 PREV VISIT EST AGE 40-64: CPT | Performed by: FAMILY MEDICINE

## 2022-05-03 PROCEDURE — G0446 INTENS BEHAVE THER CARDIO DX: HCPCS | Performed by: FAMILY MEDICINE

## 2022-05-03 ASSESSMENT — PATIENT HEALTH QUESTIONNAIRE - PHQ9
2. FEELING DOWN, DEPRESSED OR HOPELESS: 0
SUM OF ALL RESPONSES TO PHQ QUESTIONS 1-9: 0
1. LITTLE INTEREST OR PLEASURE IN DOING THINGS: 0
2. FEELING DOWN, DEPRESSED OR HOPELESS: 0
SUM OF ALL RESPONSES TO PHQ QUESTIONS 1-9: 0
SUM OF ALL RESPONSES TO PHQ9 QUESTIONS 1 & 2: 0
SUM OF ALL RESPONSES TO PHQ9 QUESTIONS 1 & 2: 0
SUM OF ALL RESPONSES TO PHQ QUESTIONS 1-9: 0
SUM OF ALL RESPONSES TO PHQ QUESTIONS 1-9: 0
1. LITTLE INTEREST OR PLEASURE IN DOING THINGS: 0
SUM OF ALL RESPONSES TO PHQ QUESTIONS 1-9: 0

## 2022-05-03 ASSESSMENT — ENCOUNTER SYMPTOMS
DIARRHEA: 0
BLOOD IN STOOL: 0
SORE THROAT: 0
BACK PAIN: 0
NAUSEA: 0
SHORTNESS OF BREATH: 0
PHOTOPHOBIA: 0
CONSTIPATION: 0
VOMITING: 0
COUGH: 0
ABDOMINAL PAIN: 0

## 2022-05-03 NOTE — PATIENT INSTRUCTIONS
Body Mass Index: Care Instructions  Your Care Instructions     Body mass index (BMI) can help you see if your weight is raising your risk for health problems. It uses a formula to compare how much you weigh with how tallyou are.  A BMI lower than 18.5 is considered underweight.  A BMI between 18.5 and 24.9 is considered healthy.  A BMI between 25 and 29.9 is considered overweight. A BMI of 30 or higher is considered obese. If your BMI is in the normal range, it means that you have a lower risk for weight-related health problems. If your BMI is in the overweight or obese range, you may be at increased risk for weight-related health problems, such as high blood pressure, heart disease, stroke, arthritis or joint pain, and diabetes. If your BMI is in the underweight range, you may be at increased risk for health problems such as fatigue, lower protection (immunity) againstillness, muscle loss, bone loss, hair loss, and hormone problems. BMI is just one measure of your risk for weight-related health problems. You may be at higher risk for health problems if you are not active, you eat anunhealthy diet, or you drink too much alcohol or use tobacco products. Follow-up care is a key part of your treatment and safety. Be sure to make and go to all appointments, and call your doctor if you are having problems. It's also a good idea to know your test results and keep alist of the medicines you take. How can you care for yourself at home?  Practice healthy eating habits. This includes eating plenty of fruits, vegetables, whole grains, lean protein, and low-fat dairy.  If your doctor recommends it, get more exercise. Walking is a good choice. Bit by bit, increase the amount you walk every day. Try for at least 30 minutes on most days of the week.  Do not smoke. Smoking can increase your risk for health problems. If you need help quitting, talk to your doctor about stop-smoking programs and medicines. These can increase your chances of quitting for good.  Limit alcohol to 2 drinks a day for men and 1 drink a day for women. Too much alcohol can cause health problems. If you have a BMI higher than 25   Your doctor may do other tests to check your risk for weight-related health problems. This may include measuring the distance around your waist. A waist measurement of more than 40 inches in men or 35 inches in women can increase the risk of weight-related health problems.  Talk with your doctor about steps you can take to stay healthy or improve your health. You may need to make lifestyle changes to lose weight and stay healthy, such as changing your diet and getting regular exercise. If you have a BMI lower than 18.5   Your doctor may do other tests to check your risk for health problems.  Talk with your doctor about steps you can take to stay healthy or improve your health. You may need to make lifestyle changes to gain or maintain weight and stay healthy, such as getting more healthy foods in your diet and doing exercises to build muscle. Where can you learn more? Go to https://Appsdaily Solutionsangela.[employer]. org and sign in to your [employer] account. Enter S176 in the [employer] box to learn more about \"Body Mass Index: Care Instructions. \"     If you do not have an account, please click on the \"Sign Up Now\" link. Current as of: December 27, 2021               Content Version: 13.2  © 2006-2022 Healthwise, Incorporated. Care instructions adapted under license by [facility] ([facility]). If you have questions about a medical condition or this instruction, always ask your healthcare professional. [place] [address] any warranty or liability for your use of this information. DASH Diet: Care Instructions  Your Care Instructions     The DASH diet is an eating plan that can help lower your blood pressure. DASH stands for Dietary Approaches to Stop Hypertension.  Hypertension is high bloodpressure. The DASH diet focuses on eating foods that are high in calcium, potassium, and magnesium. These nutrients can lower blood pressure. The foods that are highest in these nutrients are fruits, vegetables, low-fat dairy products, nuts, seeds, and legumes. But taking calcium, potassium, and magnesium supplements instead of eating foods that are high in those nutrients does not have the same effect. The DASH diet also includes whole grains, fish, and poultry. The DASH diet is one of several lifestyle changes your doctor may recommend to lower your high blood pressure. Your doctor may also want you to decrease the amount of sodium in your diet. Lowering sodium while following the DASH dietcan lower blood pressure even further than just the DASH diet alone. Follow-up care is a key part of your treatment and safety. Be sure to make and go to all appointments, and call your doctor if you are having problems. It's also a good idea to know your test results and keep alist of the medicines you take. How can you care for yourself at home? Following the DASH diet   Eat 4 to 5 servings of fruit each day. A serving is 1 medium-sized piece of fruit, ½ cup chopped or canned fruit, 1/4 cup dried fruit, or 4 ounces (½ cup) of fruit juice. Choose fruit more often than fruit juice.  Eat 4 to 5 servings of vegetables each day. A serving is 1 cup of lettuce or raw leafy vegetables, ½ cup of chopped or cooked vegetables, or 4 ounces (½ cup) of vegetable juice. Choose vegetables more often than vegetable juice.  Get 2 to 3 servings of low-fat and fat-free dairy each day. A serving is 8 ounces of milk, 1 cup of yogurt, or 1 ½ ounces of cheese.  Eat 6 to 8 servings of grains each day. A serving is 1 slice of bread, 1 ounce of dry cereal, or ½ cup of cooked rice, pasta, or cooked cereal. Try to choose whole-grain products as much as possible.  Limit lean meat, poultry, and fish to 2 servings each day.  A serving is 3 ounces, about the size of a deck of cards.  Eat 4 to 5 servings of nuts, seeds, and legumes (cooked dried beans, lentils, and split peas) each week. A serving is 1/3 cup of nuts, 2 tablespoons of seeds, or ½ cup of cooked beans or peas.  Limit fats and oils to 2 to 3 servings each day. A serving is 1 teaspoon of vegetable oil or 2 tablespoons of salad dressing.  Limit sweets and added sugars to 5 servings or less a week. A serving is 1 tablespoon jelly or jam, ½ cup sorbet, or 1 cup of lemonade.  Eat less than 2,300 milligrams (mg) of sodium a day. If you limit your sodium to 1,500 mg a day, you can lower your blood pressure even more.  Be aware that all of these are the suggested number of servings for people who eat 1,800 to 2,000 calories a day. Your recommended number of servings may be different if you need more or fewer calories. Tips for success   Start small. Do not try to make dramatic changes to your diet all at once. You might feel that you are missing out on your favorite foods and then be more likely to not follow the plan. Make small changes, and stick with them. Once those changes become habit, add a few more changes.  Try some of the following:  ? Make it a goal to eat a fruit or vegetable at every meal and at snacks. This will make it easy to get the recommended amount of fruits and vegetables each day. ? Try yogurt topped with fruit and nuts for a snack or healthy dessert. ? Add lettuce, tomato, cucumber, and onion to sandwiches. ? Combine a ready-made pizza crust with low-fat mozzarella cheese and lots of vegetable toppings. Try using tomatoes, squash, spinach, broccoli, carrots, cauliflower, and onions. ? Have a variety of cut-up vegetables with a low-fat dip as an appetizer instead of chips and dip. ? Sprinkle sunflower seeds or chopped almonds over salads. Or try adding chopped walnuts or almonds to cooked vegetables. ? Try some vegetarian meals using beans and peas. Add garbanzo or kidney beans to salads. Make burritos and tacos with mashed hyatt beans or black beans. Where can you learn more? Go to https://Sina Weiboangela.[employer]. org and sign in to your [employer] account. Enter F773 in the [facility] box to learn more about \"DASH Diet: Care Instructions. \"     If you do not have an account, please click on the \"Sign Up Now\" link. Current as of: January 10, 2022               Content Version: 13.2  © 0410-4840 [employer]. Care instructions adapted under license by [facility] ([facility]). If you have questions about a medical condition or this instruction, always ask your healthcare professional. [address] any warranty or liability for your use of this information. Well Visit, Women 48 to 72: Care Instructions  Overview     Well visits can help you stay healthy. Your doctor has checked your overall health and may have suggested ways to take good care of yourself. Your doctor also may have recommended tests. At home, you can help prevent illness withhealthy eating, regular exercise, and other steps. Follow-up care is a key part of your treatment and safety. Be sure to make and go to all appointments, and call your doctor if you are having problems. It's also a good idea to know your test results and keep alist of the medicines you take. How can you care for yourself at home?  Get screening tests that you and your doctor decide on. Screening helps find diseases before any symptoms appear.  Eat healthy foods. Choose fruits, vegetables, whole grains, protein, and low-fat dairy foods. Limit fat, especially saturated fat. Reduce salt in your diet.  Limit alcohol. Have no more than 1 drink a day or 7 drinks a week.  Get at least 30 minutes of exercise on most days of the week. Walking is a good choice.  You also may want to do other activities, such as running, swimming, cycling, or playing tennis or team sports.  Reach and stay at a healthy weight. This will lower your risk for many problems, such as obesity, diabetes, heart disease, and high blood pressure.  Do not smoke. Smoking can make health problems worse. If you need help quitting, talk to your doctor about stop-smoking programs and medicines. These can increase your chances of quitting for good.  Care for your mental health. It is easy to get weighed down by worry and stress. Learn strategies to manage stress, like deep breathing and mindfulness, and stay connected with your family and community. If you find you often feel sad or hopeless, talk with your doctor. Treatment can help.  Talk to your doctor about whether you have any risk factors for sexually transmitted infections (STIs). You can help prevent STIs if you wait to have sex with a new partner (or partners) until you've each been tested for STIs. It also helps if you use condoms (male or female condoms) and if you limit your sex partners to one person who only has sex with you. Vaccines are available for some STIs.  If you think you may have a problem with alcohol or drug use, talk to your doctor. This includes prescription medicines (such as amphetamines and opioids) and illegal drugs (such as cocaine and methamphetamine). Your doctor can help you figure out what type of treatment is best for you.  Protect your skin from too much sun. When you're outdoors from 10 a.m. to 4 p.m., stay in the shade or cover up with clothing and a hat with a wide brim. Wear sunglasses that block UV rays. Even when it's cloudy, put broad-spectrum sunscreen (SPF 30 or higher) on any exposed skin.  See a dentist one or two times a year for checkups and to have your teeth cleaned.  Wear a seat belt in the car. When should you call for help? Watch closely for changes in your health, and be sure to contact your doctor if you have any problems or symptoms that concern you. Where can you learn more?   Go to https://chpepiceweb.healthAtlantia [email or web address] org and sign in to your [employer] account. Enter I836 in the [facility] box to learn more about \"Well Visit, Women 50 to 72: Care Instructions. \"     If you do not have an account, please click on the \"Sign Up Now\" link. Current as of: October 6, 2021               Content Version: 13.2  © 1870-0317 Healthwise, Incorporated. Care instructions adapted under license by [facility] ([facility]). If you have questions about a medical condition or this instruction, always ask your healthcare professional. [place] [address] any warranty or liability for your use of this information.

## 2022-05-03 NOTE — PROGRESS NOTES
Well Adult Note  Name: Ghulam Osborne Date: 5/3/2022   MRN: [id number] Sex: Female   Age: 65 y.o. Ethnicity: Non- / Non    : 1960 Race: White (non-)      Paul Toledo is here for well adult exam.  History: At this time patient is here for yearly physical.  Patient states she continues to follow with podiatry and states that they are currently trying to treat her previous symptoms. Denies any chest pain or shortness of breath. Denies any other current complaints or issues. Review of Systems   Constitutional: Negative for chills and fever. HENT: Negative for congestion, hearing loss, nosebleeds and sore throat. Eyes: Negative for photophobia. Respiratory: Negative for cough and shortness of breath. Cardiovascular: Negative for chest pain, palpitations and leg swelling. Gastrointestinal: Negative for abdominal pain, blood in stool, constipation, diarrhea, nausea and vomiting. Endocrine: Negative for polydipsia. Genitourinary: Negative for dysuria, frequency, hematuria and urgency. Musculoskeletal: Positive for arthralgias, gait problem and myalgias. Negative for back pain. Skin: Negative. Neurological: Negative for dizziness, tremors, weakness and headaches. Hematological: Does not bruise/bleed easily. Psychiatric/Behavioral: Negative for hallucinations and suicidal ideas. All other systems reviewed and are negative.       Allergies   Allergen Reactions    Latex      Leaves marks on skin    Aspirin Hives and Shortness Of Breath     sick  Other reaction(s): Disease type AND/OR category unknown (finding), GI Upset    Codeine Hives and Shortness Of Breath     fatique  Other reaction(s): Disease type AND/OR category unknown (finding)    Penicillins Hives and Shortness Of Breath     swelling  Other reaction(s): Disease type AND/OR category unknown (finding), GI Upset  headaches      Soap Hives and Shortness Of Breath    Bee Venom      Other reaction(s): Disease type AND/OR category unknown (finding)    Sulfa Antibiotics      Family history          Prior to Visit Medications    Not on File         Past Medical History:   Diagnosis Date    Asthma     Right foot pain        Past Surgical History:   Procedure Laterality Date    ANKLE SURGERY Right 2021    REPAIR ANTERIOR TALAR FIBULAR LIGAMENT RIGHT FOOT REPAIR PERONEAL TENDON RIGHT FOOT, SURAL NERVE LYSIS  W [facility]P performed [facility] Panchito Harper D[facility]      FOOT SURGERY Right     Tendon repair    HYSTERECTOMY      KNEE SURGERY Bilateral     MENISCECTOMY Right        No family history on file. Social History     Tobacco Use    Smoking status: Former Smoker     Types: Cigarettes     Quit date: 1996     Years since quittin.0    Smokeless tobacco: Never Used   Vaping Use    Vaping Use: Never used   Substance Use Topics    Alcohol use: Not Currently    Drug use: Never       Objective   /84   Pulse 82   Temp 98 °F (36.7 °C)   Ht 5' 4\" (1.626 m)   Wt 189 lb (85.7 kg)   SpO2 98%   BMI 32.44 kg/m²   Wt Readings from Last 3 Encounters:   22 189 lb (85.7 kg)   22 186 lb (84.4 kg)   22 186 lb (84.4 kg)     There were no vitals filed for this visit. Physical Exam  Vitals reviewed. Constitutional:       Appearance: She is obese. HENT:      Head: Normocephalic and atraumatic. Eyes:      General: No scleral icterus. Conjunctiva/sclera: Conjunctivae normal.      Pupils: Pupils are equal, round, and reactive to light. Neck:      Thyroid: No thyromegaly. Cardiovascular:      Rate and Rhythm: Normal rate and regular rhythm. Heart sounds: Normal heart sounds. No murmur heard. Pulmonary:      Effort: Pulmonary effort is normal.      Breath sounds: Normal breath sounds. No rales. Abdominal:      General: Bowel sounds are normal. There is no distension. Palpations: Abdomen is soft. Tenderness:  There is no abdominal tenderness. Musculoskeletal:         General: Normal range of motion. Cervical back: Neck supple. Lymphadenopathy:      Cervical: No cervical adenopathy. Skin:     General: Skin is warm and dry. Findings: No erythema or rash. Neurological:      Mental Status: She is alert and oriented to person, place, and time. Cranial Nerves: No cranial nerve deficit. Gait: Gait abnormal.   Psychiatric:         Judgment: Judgment normal.           Assessment   Plan   1. Encounter for well adult exam without abnormal findings  -     ME Intens behave ther cardio dx, 15 minutes []  -     ME Behavior  obesity 15m []  -     CBC with Auto Differential; Future  -     T4, Free; Future  -     Uric Acid; Future  -     TSH; Future  -     Hepatic Function Panel; Future  -     Basic Metabolic Panel; Future  -     Lipid Panel; Future  -     Microalbumin / Creatinine Urine Ratio; Future  -     Hemoglobin A1C; Future  -     C-Reactive Protein; Future  2. Screening for cardiovascular condition  -     ME Intens behave ther cardio dx, 15 minutes []  -     ME Behavior  obesity 15m []  3. Screening for malignant neoplasm of the rectum  -     Fecal DNA Colorectal cancer screening (Cologuard)         Personalized Preventive Plan   Current Health Maintenance Status    There is no immunization history on file for this patient.      Health Maintenance   Topic Date Due    COVID-19 Vaccine (1) Never done    HIV screen  Never done    DTaP/Tdap/Td vaccine (1 - Tdap) Never done    Colorectal Cancer Screen  Never done    Breast cancer screen  Never done    Shingles vaccine (1 of 2) Never done    TSH  04/15/2022    Flu vaccine (Season Ended) 09/01/2022    Depression Screen  05/03/2023    Diabetes screen  04/15/2024    Lipids  04/15/2026    Hepatitis C screen  Completed    Hepatitis A vaccine  Aged Out    Hepatitis B vaccine  Aged Out    Hib vaccine  Aged Out    Meningococcal (ACWY) vaccine  Aged Out    Pneumococcal 0-64 years Vaccine  Aged Out     Recommendations for [facility] Due: see orders and patient instructions/AVS.    No follow-ups on file. Obesity Counseling: Assessed behavioral health risks and factors affecting choice of behavior. Suggested weight control approaches, including dietary changes behavioral modification and follow up plan. Provided educational and support documentation. Time spent (minutes): 10    Cardiovascular Disease Risk Counseling: Assessed the patient's risk to develop cardiovascular disease and reviewed main risk factors. Reviewed steps to reduce disease risk including:   · Quitting tobacco use, reducing amount smoked, or not starting the habit  · Making healthy food choices  · Being physically active and gradualy increasing activity levels   · Reduce weight and determine a healthy BMI goal  · Monitor blood pressure and treat if higher than 140/90 mmHg  · Maintain blood total cholesterol levels under 5 mmol/l or 190 mg/dl  · Maintain LDL cholesterol levels under 3.0 mmol/l or 115 mg/dl   · Control blood glucose levels  · Consider taking aspirin (75 mg daily), once blood pressure is controlled   Provided a follow up plan.   Time spent (minutes): 10

## 2022-05-03 NOTE — PROGRESS NOTES
5/3/22  Alpha Pila : 1960 Sex: female  Age: 65 y.o. Patient was referred by Carrie Huang DO    Chief Complaint   Patient presents with    Post-Op Check     21 sx removed a suture in March still having issues        SUBJECTIVE this patient is seen today for issues with her right ankle. The area that was had a surgical removal of a suture is doing well she still getting a burning sensation on the outside of the right ankle. HPI  Review of Systems  Const: Denies constitutional symptoms  Musculo: Denies symptoms other than stated above  Skin: Denies symptoms other than stated above     No current outpatient medications on file. Allergies   Allergen Reactions    Latex      Leaves marks on skin    Aspirin Hives and Shortness Of Breath     sick  Other reaction(s): Disease type AND/OR category unknown (finding), GI Upset    Codeine Hives and Shortness Of Breath     fatique  Other reaction(s): Disease type AND/OR category unknown (finding)    Penicillins Hives and Shortness Of Breath     swelling  Other reaction(s): Disease type AND/OR category unknown (finding), GI Upset  headaches      Soap Hives and Shortness Of Breath    Bee Venom      Other reaction(s): Disease type AND/OR category unknown (finding)    Sulfa Antibiotics      Family history        Past Medical History:   Diagnosis Date    Asthma     Right foot pain      Past Surgical History:   Procedure Laterality Date    ANKLE SURGERY Right 2021    REPAIR ANTERIOR TALAR FIBULAR LIGAMENT RIGHT FOOT REPAIR PERONEAL TENDON RIGHT FOOT, SURAL NERVE LYSIS  W PRP performed by Marquis Roberto DPM at [address] [place] [address] Right     Tendon repair    HYSTERECTOMY      KNEE SURGERY Bilateral     MENISCECTOMY Right      No family history on file.   Social History     Socioeconomic History    Marital status:      Spouse name: Not on file    Number of children: Not on file    Years of education: Not on file    Highest education level: Not on file   Occupational History    Not on file   Tobacco Use    Smoking status: Former Smoker     Types: Cigarettes     Quit date: 1996     Years since quittin.0    Smokeless tobacco: Never Used   Vaping Use    Vaping Use: Never used   Substance and Sexual Activity    Alcohol use: Not Currently    Drug use: Never    Sexual activity: Not on file   Other Topics Concern    Not on file   Social History Narrative    Not on file     Social Determinants of Health     Financial Resource Strain:     Difficulty of Paying Living Expenses: Not on file   Food Insecurity:     Worried About Running Out of Food in the Last Year: Not on file    Hiren of Food in the Last Year: Not on file   Transportation Needs:     Lack of Transportation (Medical): Not on file    Lack of Transportation (Non-Medical):  Not on file   Physical Activity:     Days of Exercise per Week: Not on file    Minutes of Exercise per Session: Not on file   Stress:     Feeling of Stress : Not on file   Social Connections:     Frequency of Communication with Friends and Family: Not on file    Frequency of Social Gatherings with Friends and Family: Not on file    Attends Holiness Services: Not on file    Active Member of [address] [place] [address] or Organizations: Not on file    Attends Club or Organization Meetings: Not on file    Marital Status: Not on file   Intimate Partner Violence:     Fear of Current or Ex-Partner: Not on file    Emotionally Abused: Not on file    Physically Abused: Not on file    Sexually Abused: Not on file   Housing Stability:     Unable to Pay for Housing in the Last Year: Not on file    Number of [place] in the Last Year: Not on file    Unstable Housing in the Last Year: Not on file       Vitals:    22 1622   Temp: 97.7 °F (36.5 °C)   TempSrc: Temporal   Weight: 186 lb (84.4 kg)       Focused Lower Extremity Physical Exam:  Vitals:    22 1622   Temp: 97.7 °F (36.5 °C)        Foot Exam    General  General Appearance: appears stated age and healthy   Orientation: alert and oriented to person, place, and time       Right Foot/Ankle     Neurovascular  Dorsalis pedis: 3+  Posterior tibial: 3+  Saphenous nerve sensation: normal  Tibial nerve sensation: normal  Superficial peroneal nerve sensation: normal  Deep peroneal nerve sensation: normal  Sural nerve sensation: diminished  Achilles reflex: 2+  Babinski reflex: 2+    Muscle Strength  Ankle dorsiflexion: 5  Ankle plantar flexion: 5  Ankle inversion: 5  Ankle eversion: 5  Great toe extension: 5  Great toe flexion: 5    Range of Motion    Normal right ankle ROM             Right Ankle Exam   Right ankle exam is normal.    Range of Motion   The patient has normal right ankle ROM. Muscle Strength   Dorsiflexion:  5/5  Plantar flexion:  5/5            Vascular: pulses  dp  pt palpable  Capillary Refill Time:   Hair growth  Skin:    Edema:    Neurologic:      Musculoskeletal/ Orthopedic examination: Incision site has healed. There is pain with palpation along the sural nerve of the right ankle. New x-rays were taken showing no osseous pathology. NAIL  Web space   Derm:          Assessment and Plan: Today we reviewed the x-rays I still feel that the pain is coming from a sural nerve entrapment or damage to the sural nerve we will start with a small Marcaine injection. There was 1 cc of 0.5% Marcaine plain injected around the sural nerve patient is to go back into the cam walker for 3 to 5 days limited activities reappoint 2 weeks  TYRONE FAULKNER [facility] was seen today for post-op check. Diagnoses and all orders for this visit:    Pain in right foot  -     XR FOOT RIGHT (MIN 3 VIEWS); Future    Disorder of right sural nerve        No follow-ups on file. Seen By:  Niko Sánchez DPM      Document was created using voice recognition software. Note was reviewed, however may contain grammatical errors.

## 2022-05-04 LAB
ALBUMIN SERPL-MCNC: 4.6 G/DL (ref 3.5–5.2)
ALP BLD-CCNC: 80 U/L (ref 35–104)
ALT SERPL-CCNC: 17 U/L (ref 0–32)
ANION GAP SERPL CALCULATED.3IONS-SCNC: 14 MMOL/L (ref 7–16)
AST SERPL-CCNC: 19 U/L (ref 0–31)
BILIRUB SERPL-MCNC: 0.2 MG/DL (ref 0–1.2)
BILIRUBIN DIRECT: <0.2 MG/DL (ref 0–0.3)
BILIRUBIN, INDIRECT: NORMAL MG/DL (ref 0–1)
BUN BLDV-MCNC: 14 MG/DL (ref 6–23)
C-REACTIVE PROTEIN: 0.3 MG/DL (ref 0–0.4)
CALCIUM SERPL-MCNC: 9.3 MG/DL (ref 8.6–10.2)
CHLORIDE BLD-SCNC: 105 MMOL/L (ref 98–107)
CHOLESTEROL, TOTAL: 298 MG/DL (ref 0–199)
CO2: 23 MMOL/L (ref 22–29)
CREAT SERPL-MCNC: 0.8 MG/DL (ref 0.5–1)
GFR AFRICAN AMERICAN: >60
GFR NON-AFRICAN AMERICAN: >60 ML/MIN/1.73
GLUCOSE BLD-MCNC: 125 MG/DL (ref 74–99)
HDLC SERPL-MCNC: 79 MG/DL
LDL CHOLESTEROL CALCULATED: 191 MG/DL (ref 0–99)
POTASSIUM SERPL-SCNC: 3.9 MMOL/L (ref 3.5–5)
SODIUM BLD-SCNC: 142 MMOL/L (ref 132–146)
T4 FREE: 0.94 NG/DL (ref 0.93–1.7)
TOTAL PROTEIN: 7.2 G/DL (ref 6.4–8.3)
TRIGL SERPL-MCNC: 139 MG/DL (ref 0–149)
TSH SERPL DL<=0.05 MIU/L-ACNC: 3.08 UIU/ML (ref 0.27–4.2)
URIC ACID, SERUM: 4.7 MG/DL (ref 2.4–5.7)
VLDLC SERPL CALC-MCNC: 28 MG/DL

## 2022-05-16 ENCOUNTER — OFFICE VISIT (OUTPATIENT)
Dept: PODIATRY | Age: 62
End: 2022-05-16
Payer: COMMERCIAL

## 2022-05-16 VITALS
TEMPERATURE: 97.8 F | SYSTOLIC BLOOD PRESSURE: 138 MMHG | DIASTOLIC BLOOD PRESSURE: 78 MMHG | BODY MASS INDEX: 32.44 KG/M2 | WEIGHT: 189 LBS

## 2022-05-16 DIAGNOSIS — G57.81 DISORDER OF RIGHT SURAL NERVE: Primary | ICD-10-CM

## 2022-05-16 DIAGNOSIS — M79.671 PAIN IN RIGHT FOOT: ICD-10-CM

## 2022-05-16 PROCEDURE — 99213 OFFICE O/P EST LOW 20 MIN: CPT | Performed by: PODIATRIST

## 2022-05-16 NOTE — PROGRESS NOTES
5/3/22  Rhonda Ng : 1960 Sex: female  Age: 65 y.o. Patient was referred by Herminia Zamora DO    Chief Complaint   Patient presents with    Post-Op Check     right foot in cam walker last visit had one inj and knee walker    Foot Pain       SUBJECTIVE this patient is seen today for issues with her right ankle. The area that was had a surgical removal of a suture is doing well she still getting a burning sensation on the outside of the right ankle. HPI  Review of Systems  Const: Denies constitutional symptoms  Musculo: Denies symptoms other than stated above  Skin: Denies symptoms other than stated above     No current outpatient medications on file. Allergies   Allergen Reactions    Latex      Leaves marks on skin    Aspirin Hives and Shortness Of Breath     sick  Other reaction(s): Disease type AND/OR category unknown (finding), GI Upset    Codeine Hives and Shortness Of Breath     fatique  Other reaction(s): Disease type AND/OR category unknown (finding)    Penicillins Hives and Shortness Of Breath     swelling  Other reaction(s): Disease type AND/OR category unknown (finding), GI Upset  headaches      Soap Hives and Shortness Of Breath    Bee Venom      Other reaction(s): Disease type AND/OR category unknown (finding)    Sulfa Antibiotics      Family history        Past Medical History:   Diagnosis Date    Asthma     Right foot pain      Past Surgical History:   Procedure Laterality Date    ANKLE SURGERY Right 2021    REPAIR ANTERIOR TALAR FIBULAR LIGAMENT RIGHT FOOT REPAIR PERONEAL TENDON RIGHT FOOT, SURAL NERVE LYSIS  W PRP performed by Gissell Taylor DPM at [address] [place] [address] Right     Tendon repair    HYSTERECTOMY      KNEE SURGERY Bilateral     MENISCECTOMY Right      No family history on file.   Social History     Socioeconomic History    Marital status:      Spouse name: Not on file    Number of children: Not on file    Years of education: Not on file    Highest education level: Not on file   Occupational History    Not on file   Tobacco Use    Smoking status: Former Smoker     Types: Cigarettes     Quit date: 1996     Years since quittin.0    Smokeless tobacco: Never Used   Vaping Use    Vaping Use: Never used   Substance and Sexual Activity    Alcohol use: Not Currently    Drug use: Never    Sexual activity: Not on file   Other Topics Concern    Not on file   Social History Narrative    Not on file     Social Determinants of Health     Financial Resource Strain:     Difficulty of Paying Living Expenses: Not on file   Food Insecurity:     Worried About [address] [employer] in the Last Year: Not on file    Hiren of Food in the Last Year: Not on file   Transportation Needs:     Lack of Transportation (Medical): Not on file    Lack of Transportation (Non-Medical):  Not on file   Physical Activity:     Days of Exercise per Week: Not on file    Minutes of Exercise per Session: Not on file   Stress:     Feeling of Stress : Not on file   Social Connections:     Frequency of Communication with Friends and Family: Not on file    Frequency of Social Gatherings with Friends and Family: Not on file    Attends Orthodoxy Services: Not on file    Active Member of [address] [place] [address] or Organizations: Not on file    Attends Club or Organization Meetings: Not on file    Marital Status: Not on file   Intimate Partner Violence:     Fear of Current or Ex-Partner: Not on file    Emotionally Abused: Not on file    Physically Abused: Not on file    Sexually Abused: Not on file   Housing Stability:     Unable to Pay for Housing in the Last Year: Not on file    Number of [place] in the Last Year: Not on file    Unstable Housing in the Last Year: Not on file       Vitals:    22 1624   BP: 138/78   Temp: 97.8 °F (36.6 °C)   TempSrc: Temporal   Weight: 189 lb (85.7 kg)       Focused Lower Extremity Physical Exam:  Vitals: 05/16/22 1624   BP: 138/78   Temp: 97.8 °F (36.6 °C)        Foot Exam    General  General Appearance: appears stated age and healthy   Orientation: alert and oriented to person, place, and time       Right Foot/Ankle     Neurovascular  Dorsalis pedis: 3+  Posterior tibial: 3+  Saphenous nerve sensation: normal  Tibial nerve sensation: normal  Superficial peroneal nerve sensation: normal  Deep peroneal nerve sensation: normal  Sural nerve sensation: diminished  Achilles reflex: 2+  Babinski reflex: 2+    Muscle Strength  Ankle dorsiflexion: 5  Ankle plantar flexion: 5  Ankle inversion: 5  Ankle eversion: 5  Great toe extension: 5  Great toe flexion: 5    Range of Motion    Normal right ankle ROM             Right Ankle Exam   Right ankle exam is normal.    Range of Motion   The patient has normal right ankle ROM. Muscle Strength   Dorsiflexion:  5/5  Plantar flexion:  5/5            Vascular: pulses  dp  pt palpable  Capillary Refill Time:   Hair growth  Skin:    Edema:    Neurologic:      Musculoskeletal/ Orthopedic examination: Incision site has healed. There is pain with palpation along the sural nerve of the right ankle. New x-rays were taken showing no osseous pathology. NAIL  Web space   Derm:          Assessment and Plan: injection did not  Help cam walker  X 3 weeks if no improvement   Resection sural nerve    Zaynab Tobin was seen today for post-op check and foot pain. Diagnoses and all orders for this visit:    Disorder of right sural nerve    Pain in right foot        No follow-ups on file. Seen By:  Gabe Combs DPM      Document was created using voice recognition software. Note was reviewed, however may contain grammatical errors.

## 2022-06-13 ENCOUNTER — OFFICE VISIT (OUTPATIENT)
Dept: PODIATRY | Age: 62
End: 2022-06-13
Payer: COMMERCIAL

## 2022-06-13 VITALS
TEMPERATURE: 98.2 F | DIASTOLIC BLOOD PRESSURE: 82 MMHG | BODY MASS INDEX: 32.44 KG/M2 | SYSTOLIC BLOOD PRESSURE: 128 MMHG | WEIGHT: 189 LBS

## 2022-06-13 DIAGNOSIS — G57.81 DISORDER OF RIGHT SURAL NERVE: Primary | ICD-10-CM

## 2022-06-13 PROCEDURE — 99213 OFFICE O/P EST LOW 20 MIN: CPT | Performed by: PODIATRIST

## 2022-06-13 NOTE — PROGRESS NOTES
5/3/22  Chantelle Serrato : 1960 Sex: female  Age: 65 y.o. Patient was referred by Dennise Florez DO    Chief Complaint   Patient presents with    Foot Pain    Post-Op Check       SUBJECTIVE this patient is seen today for issues with her right ankle. The area that was had a surgical removal of a suture is doing well she still getting a burning sensation on the outside of the right ankle. Foot Pain   This is a recurrent problem. The current episode started more than 1 month ago. There has been no history of extremity trauma. Review of Systems  Const: Denies constitutional symptoms  Musculo: Denies symptoms other than stated above  Skin: Denies symptoms other than stated above     No current outpatient medications on file. Allergies   Allergen Reactions    Latex      Leaves marks on skin    Aspirin Hives and Shortness Of Breath     sick  Other reaction(s): Disease type AND/OR category unknown (finding), GI Upset    Codeine Hives and Shortness Of Breath     fatique  Other reaction(s): Disease type AND/OR category unknown (finding)    Penicillins Hives and Shortness Of Breath     swelling  Other reaction(s): Disease type AND/OR category unknown (finding), GI Upset  headaches      Soap Hives and Shortness Of Breath    Bee Venom      Other reaction(s): Disease type AND/OR category unknown (finding)    Sulfa Antibiotics      Family history        Past Medical History:   Diagnosis Date    Asthma     Right foot pain      Past Surgical History:   Procedure Laterality Date    ANKLE SURGERY Right 2021    REPAIR ANTERIOR TALAR FIBULAR LIGAMENT RIGHT FOOT REPAIR PERONEAL TENDON RIGHT FOOT, SURAL NERVE LYSIS  W PRP performed by Helene Branch DPM at [address] [place] [address] Right     Tendon repair    HYSTERECTOMY (CERVIX STATUS UNKNOWN)      KNEE SURGERY Bilateral     MENISCECTOMY Right      No family history on file.   Social History     Socioeconomic History    Marital status:      Spouse name: Not on file    Number of children: Not on file    Years of education: Not on file    Highest education level: Not on file   Occupational History    Not on file   Tobacco Use    Smoking status: Former Smoker     Types: Cigarettes     Quit date: 1996     Years since quittin.1    Smokeless tobacco: Never Used   Vaping Use    Vaping Use: Never used   Substance and Sexual Activity    Alcohol use: Not Currently    Drug use: Never    Sexual activity: Not on file   Other Topics Concern    Not on file   Social History Narrative    Not on file     Social Determinants of Health     Financial Resource Strain:     Difficulty of Paying Living Expenses: Not on file   Food Insecurity:     Worried About [address] [employer] in the Last Year: Not on file    Hiren of Food in the Last Year: Not on file   Transportation Needs:     Lack of Transportation (Medical): Not on file    Lack of Transportation (Non-Medical):  Not on file   Physical Activity:     Days of Exercise per Week: Not on file    Minutes of Exercise per Session: Not on file   Stress:     Feeling of Stress : Not on file   Social Connections:     Frequency of Communication with Friends and Family: Not on file    Frequency of Social Gatherings with Friends and Family: Not on file    Attends Islam Services: Not on file    Active Member of [address] [place] [address] [employer] or Organizations: Not on file    Attends Club or Organization Meetings: Not on file    Marital Status: Not on file   Intimate Partner Violence:     Fear of Current or Ex-Partner: Not on file    Emotionally Abused: Not on file    Physically Abused: Not on file    Sexually Abused: Not on file   Housing Stability:     Unable to Pay for Housing in the Last Year: Not on file    Number of [place] in the Last Year: Not on file    Unstable Housing in the Last Year: Not on file       Vitals:    22 1613   BP: 128/82   Temp: 98.2 °F (36.8 °C) TempSrc: Temporal   Weight: 189 lb (85.7 kg)       Focused Lower Extremity Physical Exam:  Vitals:    06/13/22 1613   BP: 128/82   Temp: 98.2 °F (36.8 °C)        Foot Exam    General  General Appearance: appears stated age and healthy   Orientation: alert and oriented to person, place, and time       Right Foot/Ankle     Neurovascular  Dorsalis pedis: 3+  Posterior tibial: 3+  Saphenous nerve sensation: normal  Tibial nerve sensation: normal  Superficial peroneal nerve sensation: normal  Deep peroneal nerve sensation: normal  Sural nerve sensation: diminished  Achilles reflex: 2+  Babinski reflex: 2+    Muscle Strength  Ankle dorsiflexion: 5  Ankle plantar flexion: 5  Ankle inversion: 5  Ankle eversion: 5  Great toe extension: 5  Great toe flexion: 5    Range of Motion    Normal right ankle ROM             Right Ankle Exam   Right ankle exam is normal.    Range of Motion   The patient has normal right ankle ROM. Muscle Strength   Dorsiflexion:  5/5  Plantar flexion:  5/5            Vascular: pulses  dp  pt palpable  Capillary Refill Time:   Hair growth  Skin:    Edema:    Neurologic:      Musculoskeletal/ Orthopedic examination: Incision site has healed. There is pain with palpation along the sural nerve of the right ankle. New x-rays were taken showing no osseous pathology. NAIL  Web space   Derm:          Assessment and Plan: injection did not help  Sx will be discussed   Resection and entrancement of sural nerve    I had a long discussion today with the patient about the likely diagnosis and its natural history, physical exam and imaging findings, as well as treatment options. We discussed both surgical and nonsurgical treatments, including risks and benefits. From a nonoperative standpoint, we discussed rest/activity modification, NSAIDs/Acetaminophen/topical anesthetics, orthotics, bracing/immobilization, and physical therapy.   After conservative treatment has failed patient agrees and would like to have surgery today, we reviewed all aspects of the surgery including the risks the complications and the postop course there are no guarantees,   Patient agrees with surgery   we reviewed all risk and benefits all complications including anesthesia infection further surgery over under correction patient agrees to proceed with surgery. General the terms and procedures of the treatment were undertaken there was alternative procedures and methods discussed with the patient the patient declined these and opted for surgery. All the risks with the procedure were reviewed. Patient will have surgery at [address] [facility] in the future      Pat Acosta was seen today for foot pain and post-op check. Diagnoses and all orders for this visit:    Disorder of right sural nerve        No follow-ups on file. Seen By:  Nadine Mckeon DPM      Document was created using voice recognition software. Note was reviewed, however may contain grammatical errors.

## 2022-08-22 ENCOUNTER — OFFICE VISIT (OUTPATIENT)
Dept: PODIATRY | Age: 62
End: 2022-08-22
Payer: COMMERCIAL

## 2022-08-22 VITALS
WEIGHT: 189 LBS | DIASTOLIC BLOOD PRESSURE: 79 MMHG | SYSTOLIC BLOOD PRESSURE: 126 MMHG | BODY MASS INDEX: 32.44 KG/M2 | TEMPERATURE: 98.2 F

## 2022-08-22 DIAGNOSIS — M79.671 PAIN IN RIGHT FOOT: ICD-10-CM

## 2022-08-22 DIAGNOSIS — G57.81 DISORDER OF RIGHT SURAL NERVE: Primary | ICD-10-CM

## 2022-08-22 DIAGNOSIS — M79.5 FOREIGN BODY (FB) IN SOFT TISSUE: ICD-10-CM

## 2022-08-22 PROCEDURE — 99213 OFFICE O/P EST LOW 20 MIN: CPT | Performed by: PODIATRIST

## 2022-08-22 NOTE — PATIENT INSTRUCTIONS
SURGERY WILL SCHEDULED FOR 10/14/22 AT [facility]  They will contact you to give you all your preop instructions  Do not eat or drink anything after midnight on 10/13/22 nothing morning of surgery  Call Natalia Miranda if any issues [phone]

## 2022-08-22 NOTE — PROGRESS NOTES
5/3/22  Heidi García : 1960 Sex: female  Age: 65 y.o. Patient was referred by Andrew Chicas DO    Chief Complaint   Patient presents with    Post-Op Check     PT WANTS TO DISCUSS SX 10/14/22 RIGHT FOOT       SUBJECTIVE this patient is seen today for issues with her right ankle. The area that was had a surgical removal of a suture is doing well she still getting a burning sensation on the outside of the right ankle. Foot Pain   This is a recurrent problem. The current episode started more than 1 month ago. There has been no history of extremity trauma. Review of Systems  Const: Denies constitutional symptoms  Musculo: Denies symptoms other than stated above  Skin: Denies symptoms other than stated above     No current outpatient medications on file. Allergies   Allergen Reactions    Latex      Leaves marks on skin    Aspirin Hives and Shortness Of Breath     sick  Other reaction(s): Disease type AND/OR category unknown (finding), GI Upset    Codeine Hives and Shortness Of Breath     fatique  Other reaction(s): Disease type AND/OR category unknown (finding)    Penicillins Hives and Shortness Of Breath     swelling  Other reaction(s): Disease type AND/OR category unknown (finding), GI Upset  headaches      Soap Hives and Shortness Of Breath    Bee Venom      Other reaction(s): Disease type AND/OR category unknown (finding)    Sulfa Antibiotics      Family history        Past Medical History:   Diagnosis Date    Asthma     Right foot pain      Past Surgical History:   Procedure Laterality Date    ANKLE SURGERY Right 2021    REPAIR ANTERIOR TALAR FIBULAR LIGAMENT RIGHT FOOT REPAIR PERONEAL TENDON RIGHT FOOT, SURAL NERVE LYSIS  W PRP performed by Miguelangel Ng DPM at [address] Right     Tendon repair    HYSTERECTOMY (CERVIX STATUS UNKNOWN)      KNEE SURGERY Bilateral     MENISCECTOMY Right      No family history on file.   Social History     Socioeconomic History    Marital status:      Spouse name: Not on file    Number of children: Not on file    Years of education: Not on file    Highest education level: Not on file   Occupational History    Not on file   Tobacco Use    Smoking status: Former     Types: Cigarettes     Quit date: 1996     Years since quittin.3    Smokeless tobacco: Never   Vaping Use    Vaping Use: Never used   Substance and Sexual Activity    Alcohol use: Not Currently    Drug use: Never    Sexual activity: Not on file   Other Topics Concern    Not on file   Social History Narrative    Not on file     Social Determinants of Health     Financial Resource Strain: Not on file   Food Insecurity: Not on file   Transportation Needs: Not on file   Physical Activity: Not on file   Stress: Not on file   Social Connections: Not on file   Intimate Partner Violence: Not on file   Housing Stability: Not on file       Vitals:    22 1611   BP: 126/79   Temp: 98.2 °F (36.8 °C)   TempSrc: Temporal   Weight: 189 lb (85.7 kg)       Focused Lower Extremity Physical Exam:  Vitals:    22 1611   BP: 126/79   Temp: 98.2 °F (36.8 °C)        Foot Exam    General  General Appearance: appears stated age and healthy   Orientation: alert and oriented to person, place, and time       Right Foot/Ankle     Neurovascular  Dorsalis pedis: 3+  Posterior tibial: 3+  Saphenous nerve sensation: normal  Tibial nerve sensation: normal  Superficial peroneal nerve sensation: normal  Deep peroneal nerve sensation: normal  Sural nerve sensation: diminished  Achilles reflex: 2+  Babinski reflex: 2+    Muscle Strength  Ankle dorsiflexion: 5  Ankle plantar flexion: 5  Ankle inversion: 5  Ankle eversion: 5  Great toe extension: 5  Great toe flexion: 5    Range of Motion    Normal right ankle ROM           Right Ankle Exam   Right ankle exam is normal.    Range of Motion   The patient has normal right ankle ROM.     Muscle Strength   Dorsiflexion:  5/5  Plantar flexion:  5/5           Vascular: pulses  dp  pt palpable  Capillary Refill Time:   Hair growth  Skin:    Edema:    Neurologic:      Musculoskeletal/ Orthopedic examination: Incision site has healed. There is pain with palpation along the sural nerve of the right ankle. New x-rays were taken showing no osseous pathology. NAIL  Web space   Derm:          Assessment and Plan: injection did not help  Sx will be discussed   Resection and entrancement of sural nerve  ,removal painful suture right foot   I had a long discussion today with the patient about the likely diagnosis and its natural history, physical exam and imaging findings, as well as treatment options. We discussed both surgical and nonsurgical treatments, including risks and benefits. From a nonoperative standpoint, we discussed rest/activity modification, NSAIDs/Acetaminophen/topical anesthetics, orthotics, bracing/immobilization, and physical therapy. After conservative treatment has failed patient agrees and would like to have surgery today, we reviewed all aspects of the surgery including the risks the complications and the postop course there are no guarantees,   Patient agrees with surgery   we reviewed all risk and benefits all complications including anesthesia infection further surgery over under correction patient agrees to proceed with surgery. General the terms and procedures of the treatment were undertaken there was alternative procedures and methods discussed with the patient the patient declined these and opted for surgery. All the risks with the procedure were reviewed. Patient will have surgery at [facility] in the future      Rosaura Briscoe was seen today for post-op check. Diagnoses and all orders for this visit:    Disorder of right sural nerve    Pain in right foot    Foreign body (FB) in soft tissue      No follow-ups on file. Seen By:  Jack Rojas DPM      Document was created using voice recognition software.   Note was reviewed, however may contain grammatical errors.

## 2022-08-22 NOTE — LETTER
[address] [place] [address]  Diego V[place]la  Phone: [phone]  Fax: [place]        August 22, 2022     Patient: Alfredo Monge   YOB: 1960   Date of Visit: 8/22/2022       To Whom it May Concern:    Yonas Beard was seen in my clinic on 8/22/2022. She is having foot surgery on 10/14/22 will be off of work until 10/24/22    If you have any questions or concerns, please don't hesitate to call.     Sincerely,         Sandra Mcdonald, MAAMEM

## 2022-08-23 ENCOUNTER — TELEPHONE (OUTPATIENT)
Dept: PODIATRY | Age: 62
End: 2022-08-23

## 2022-08-23 NOTE — TELEPHONE ENCOUNTER
Prior Authorization Form:      DEMOGRAPHICS:                     Patient Name:  Ambrose Show  Patient :  1960            Insurance:  Payor: Adelita Echols / Plan: Adelita Echols / Product Type: *No Product type* /   Insurance ID Number:    Payer/Plan Subscr  Sex Relation Sub. Ins. ID Effective Group Num   1.  [address] 1960 Female Self [id number]                                    [id number]         DIAGNOSIS & PROCEDURE:                       Procedure/Operation:  sural nerve resection right foot  Removal FB right foot          CPT Code: 45018, 78676    Diagnosis:  sural nerve intrapement   Foreign body right foot    ICD10 Code: m79.5, g57.81    Location:  right foot    Surgeon:  dr. Renee wilson    SCHEDULING INFORMATION:                          Date: 10/14/22  Time: 11am             Anesthesia:  lmac                                                       Status:  Outpatient        Special Comments:        Electronically signed by Kenrick Rios LPN on 3/53/2480 at 2:36 AM

## 2022-10-04 ENCOUNTER — OFFICE VISIT (OUTPATIENT)
Dept: FAMILY MEDICINE CLINIC | Age: 62
End: 2022-10-04
Payer: COMMERCIAL

## 2022-10-04 VITALS
TEMPERATURE: 97.4 F | HEIGHT: 64 IN | DIASTOLIC BLOOD PRESSURE: 88 MMHG | BODY MASS INDEX: 32.37 KG/M2 | SYSTOLIC BLOOD PRESSURE: 136 MMHG | HEART RATE: 74 BPM | WEIGHT: 189.6 LBS | OXYGEN SATURATION: 98 %

## 2022-10-04 DIAGNOSIS — L03.115 CELLULITIS OF RIGHT LEG: ICD-10-CM

## 2022-10-04 DIAGNOSIS — Z01.818 PREOP EXAMINATION: Primary | ICD-10-CM

## 2022-10-04 DIAGNOSIS — Z23 NEED FOR TETANUS BOOSTER: ICD-10-CM

## 2022-10-04 PROCEDURE — 90471 IMMUNIZATION ADMIN: CPT | Performed by: FAMILY MEDICINE

## 2022-10-04 PROCEDURE — 90715 TDAP VACCINE 7 YRS/> IM: CPT | Performed by: FAMILY MEDICINE

## 2022-10-04 PROCEDURE — 99212 OFFICE O/P EST SF 10 MIN: CPT | Performed by: FAMILY MEDICINE

## 2022-10-04 RX ORDER — DOXYCYCLINE 100 MG/1
100 TABLET ORAL 2 TIMES DAILY
Qty: 20 TABLET | Refills: 0 | Status: SHIPPED | OUTPATIENT
Start: 2022-10-04 | End: 2022-10-14

## 2022-10-04 RX ORDER — CHLORAL HYDRATE 500 MG
CAPSULE ORAL DAILY
COMMUNITY

## 2022-10-04 ASSESSMENT — ENCOUNTER SYMPTOMS
BACK PAIN: 0
CONSTIPATION: 0
ABDOMINAL PAIN: 0
COUGH: 0
BLOOD IN STOOL: 0
SHORTNESS OF BREATH: 0
SORE THROAT: 0
NAUSEA: 0
PHOTOPHOBIA: 0
VOMITING: 0
DIARRHEA: 0

## 2022-10-04 NOTE — PROGRESS NOTES
Lee Ann Rodriguez (:  1960) is a 65 y.o. female,Established patient, here for evaluation of the following chief complaint(s):  Pre-op Exam (Sx on R foot with Dr. Merry Beyer on 10/14)         ASSESSMENT/PLAN:  1. Preop examination  -     [id number] Ambulatory; Future  2. Cellulitis of right leg  -     doxycycline monohydrate (ADOXA) 100 MG tablet; Take 1 tablet by mouth 2 times daily for 10 days, Disp-20 tablet, R-0Normal  3. Need for tetanus booster  -     Tdap, BOOSTRIX, (age 8 yrs+), IM  At this time patient is cleared for surgery without need for further work-up. Considerable risk. At this time  At this timePatient is cleared for surgery without need for further evaluation. Considered low risk at this time. No follow-ups on file. Subjective   SUBJECTIVE/OBJECTIVE:  HPI  Patient presents today for preoperative clearance prior to upcoming foot surgery. Patient states she has been doing well without any current issues at this time. She does however have an injury from a juanis thorn which has been present for the last several weeks. Denies any other concerns or complaints at this time. Review of Systems   Constitutional:  Negative for chills and fever. HENT:  Negative for congestion, hearing loss, nosebleeds and sore throat. Eyes:  Negative for photophobia. Respiratory:  Negative for cough and shortness of breath. Cardiovascular:  Negative for chest pain, palpitations and leg swelling. Gastrointestinal:  Negative for abdominal pain, blood in stool, constipation, diarrhea, nausea and vomiting. Endocrine: Negative for polydipsia. Genitourinary:  Negative for dysuria, frequency, hematuria and urgency. Musculoskeletal:  Positive for arthralgias, gait problem and myalgias. Negative for back pain. Skin:  Positive for rash and wound. Neurological:  Negative for dizziness, tremors, weakness and headaches. Hematological:  Does not bruise/bleed easily.    Psychiatric/Behavioral: Negative for hallucinations and suicidal ideas. All other systems reviewed and are negative.        Current Outpatient Medications:     Omega-3 Fatty Acids (FISH OIL) 1000 MG capsule, Take by mouth daily, Disp: , Rfl:     doxycycline monohydrate (ADOXA) 100 MG tablet, Take 1 tablet by mouth 2 times daily for 10 days, Disp: 20 tablet, Rfl: 0   Patient Active Problem List   Diagnosis    Hypothyroidism    Abnormal EKG    Disorder of ligament of ankle, right    Floating ossicle of ankle    Disorder of right sural nerve    Spontaneous rupture of flexor tendon of right foot     Past Medical History:   Diagnosis Date    Asthma     Right foot pain      Past Surgical History:   Procedure Laterality Date    ANKLE SURGERY Right 2021    REPAIR ANTERIOR TALAR FIBULAR LIGAMENT RIGHT FOOT REPAIR PERONEAL TENDON RIGHT FOOT, SURAL NERVE LYSIS  W PRP performed by Bowen Hall DPM at [address] Right     Tendon repair    HYSTERECTOMY (CERVIX STATUS UNKNOWN)      KNEE SURGERY Bilateral     MENISCECTOMY Right      Social History     Socioeconomic History    Marital status:      Spouse name: Not on file    Number of children: Not on file    Years of education: Not on file    Highest education level: Not on file   Occupational History    Not on file   Tobacco Use    Smoking status: Former     Types: Cigarettes     Quit date: 1996     Years since quittin.4    Smokeless tobacco: Never   Vaping Use    Vaping Use: Never used   Substance and Sexual Activity    Alcohol use: Not Currently    Drug use: Never    Sexual activity: Not on file   Other Topics Concern    Not on file   Social History Narrative    Not on file     Social Determinants of Health     Financial Resource Strain: Not on file   Food Insecurity: Not on file   Transportation Needs: Not on file   Physical Activity: Not on file   Stress: Not on file   Social Connections: Not on file   Intimate Partner Violence: Not on file Housing Stability: Not on file     History reviewed. No pertinent family history. There are no preventive care reminders to display for this patient. There are no preventive care reminders to display for this patient. There are no preventive care reminders to display for this patient. Health Maintenance Due   Topic    Shingles vaccine (1 of 2)      Health Maintenance   Topic Date Due    COVID-19 Vaccine (1) Never done    HIV screen  Never done    Cervical cancer screen  Never done    Colorectal Cancer Screen  Never done    Breast cancer screen  Never done    Shingles vaccine (1 of 2) Never done    Flu vaccine (1) Never done    Depression Screen  05/03/2023    Diabetes screen  05/03/2025    Lipids  05/03/2027    DTaP/Tdap/Td vaccine (2 - Td or Tdap) 10/04/2032    Hepatitis C screen  Completed    Hepatitis A vaccine  Aged Out    Hepatitis B vaccine  Aged Out    Hib vaccine  Aged Out    Meningococcal (ACWY) vaccine  Aged Out    Pneumococcal 0-64 years Vaccine  Aged Out      There are no preventive care reminders to display for this patient. There are no preventive care reminders to display for this patient. /88   Pulse 74   Temp 97.4 °F (36.3 °C) (Temporal)   Ht 5' 4\" (1.626 m)   Wt 189 lb 9.6 oz (86 kg)   SpO2 98%   BMI 32.54 kg/m²     Objective   Physical Exam  Vitals reviewed. Constitutional:       Appearance: She is obese. HENT:      Head: Normocephalic and atraumatic. Eyes:      General: No scleral icterus. Conjunctiva/sclera: Conjunctivae normal.      Pupils: Pupils are equal, round, and reactive to light. Neck:      Thyroid: No thyromegaly. Cardiovascular:      Rate and Rhythm: Normal rate and regular rhythm. Heart sounds: Normal heart sounds. No murmur heard. Pulmonary:      Effort: Pulmonary effort is normal.      Breath sounds: Normal breath sounds. No rales. Abdominal:      General: Bowel sounds are normal. There is no distension.       Palpations: Abdomen is soft.      Tenderness: There is no abdominal tenderness. Musculoskeletal:         General: Normal range of motion. Cervical back: Neck supple. Lymphadenopathy:      Cervical: No cervical adenopathy. Skin:     General: Skin is warm and dry. Findings: Erythema present. No rash. Comments: Areas of erythema over the right anterior tibial region. Mild cellulitis. No lymphatic streaking. Neurological:      Mental Status: She is alert and oriented to person, place, and time. Cranial Nerves: No cranial nerve deficit. Gait: Gait abnormal.   Psychiatric:         Judgment: Judgment normal.                An electronic signature was used to authenticate this note.     --Teddy Godinez, DO

## 2022-10-04 NOTE — LETTER
[address], [address]  Phone: [phone]  Fax: [phone]    Светлана Akhtar, DO          To Dr Maricarmen Fulton:    It is my medical opinion that Navya Galaviz  is deemed to be an acceptable risk and is medically optimized for upcoming foot surgery. It is my belief that she will be an acceptable candidate for any sedation that is planned for the above noted procedure. If you have any questions or concerns, please don't hesitate to call.     Sincerely,        Светлана Akhtar, DO

## 2022-10-11 NOTE — PROGRESS NOTES
Andrea PRE-ADMISSION TESTING INSTRUCTIONS    The Preadmission Testing patient is instructed accordingly using the following criteria (check applicable):    ARRIVAL INSTRUCTIONS:  [x] Parking the day of Surgery is located in the Main Entrance lot. Upon entering the door, make an immediate right to the surgery reception desk    [x] Bring photo ID and insurance card    [] Bring in a copy of Living will or Durable Power of  papers. [x] Please be sure to arrange for responsible adult to provide transportation to and from the hospital    [x] Please arrange for responsible adult to be with you for the 24 hour period post procedure due to having anesthesia    [x] If you awake am of surgery not feeling well or have temperature >100 please call [phone]    GENERAL INSTRUCTIONS:    [x] Nothing by mouth after midnight, including gum, candy, mints or water    [x] You may brush your teeth, but do not swallow any water    [] Take medications as instructed with 1-2 oz of water    [x] Stop herbal supplements and vitamins 5 days prior to procedure    [x] Follow preop dosing of blood thinners per physician instructions    [] Take 1/2 dose of evening insulin, but no insulin after midnight    [] No oral diabetic medications after midnight    [] If diabetic and have low blood sugar or feel symptomatic, take 1-2oz apple juice only    [] Bring inhalers day of surgery    [] Bring C-PAP/ Bi-Pap day of surgery    [] Bring urine specimen day of surgery    [x] Shower or bath with soap, lather and rinse well, AM of Surgery, no lotion, powders or creams to surgical site    [] Follow bowel prep as instructed per surgeon    [x] No tobacco products within 24 hours of surgery     [x] No alcohol or illegal drug use within 24 hours of surgery.     [x] Jewelry, body piercing's, eyeglasses, contact lenses and dentures are not permitted into surgery (bring cases)      [x] Please do not wear any nail polish, make up or hair products on the day of surgery    [x] You can expect a call the business day prior to procedure to notify you if your arrival time changes    [x] If you receive a survey after surgery we would greatly appreciate your comments    [] Parent/guardian of a minor must accompany their child and remain on the premises  the entire time they are under our care     [] Pediatric patients may bring favorite toy, blanket or comfort item with them    [] A caregiver or family member must remain with the patient during their stay if they are mentally handicapped, have dementia, disoriented or unable to use a call light or would be a safety concern if left unattended    [x] Please notify surgeon if you develop any illness between now and time of surgery (cold, cough, sore throat, fever, nausea, vomiting) or any signs of infections  including skin, wounds, and dental.    [x]  The Outpatient Pharmacy is available to fill your prescription here on your day of surgery, ask your preop nurse for details    [] Other instructions    EDUCATIONAL MATERIALS PROVIDED:    [] PAT Preoperative Education Packet/Booklet     [] Medication List    [] Transfusion bracelet applied with instructions    [] Shower with soap, lather and rinse well, and use CHG wipes provided the evening before surgery as instructed    [] Incentive spirometer with instructions

## 2022-10-12 ENCOUNTER — PREP FOR PROCEDURE (OUTPATIENT)
Dept: PODIATRY | Age: 62
End: 2022-10-12

## 2022-10-12 RX ORDER — SODIUM CHLORIDE 0.9 % (FLUSH) 0.9 %
5-40 SYRINGE (ML) INJECTION PRN
Status: CANCELLED | OUTPATIENT
Start: 2022-10-12

## 2022-10-12 RX ORDER — SODIUM CHLORIDE 9 MG/ML
INJECTION, SOLUTION INTRAVENOUS PRN
Status: CANCELLED | OUTPATIENT
Start: 2022-10-12

## 2022-10-12 RX ORDER — SODIUM CHLORIDE 0.9 % (FLUSH) 0.9 %
5-40 SYRINGE (ML) INJECTION EVERY 12 HOURS SCHEDULED
Status: CANCELLED | OUTPATIENT
Start: 2022-10-12

## 2022-10-13 ENCOUNTER — ANESTHESIA EVENT (OUTPATIENT)
Dept: OPERATING ROOM | Age: 62
End: 2022-10-13
Payer: COMMERCIAL

## 2022-10-14 ENCOUNTER — HOSPITAL ENCOUNTER (OUTPATIENT)
Age: 62
Setting detail: OUTPATIENT SURGERY
Discharge: HOME | End: 2022-10-14
Attending: PODIATRIST | Admitting: PODIATRIST
Payer: COMMERCIAL

## 2022-10-14 ENCOUNTER — ANESTHESIA (OUTPATIENT)
Dept: OPERATING ROOM | Age: 62
End: 2022-10-14
Payer: COMMERCIAL

## 2022-10-14 VITALS
HEIGHT: 64 IN | SYSTOLIC BLOOD PRESSURE: 151 MMHG | BODY MASS INDEX: 32.44 KG/M2 | OXYGEN SATURATION: 100 % | DIASTOLIC BLOOD PRESSURE: 84 MMHG | WEIGHT: 190 LBS | RESPIRATION RATE: 20 BRPM | TEMPERATURE: 97.1 F | HEART RATE: 74 BPM

## 2022-10-14 DIAGNOSIS — M66.371 SPONTANEOUS RUPTURE OF FLEXOR TENDON OF RIGHT FOOT: Primary | ICD-10-CM

## 2022-10-14 DIAGNOSIS — M79.5 RESIDUAL FOREIGN BODY IN SOFT TISSUE: ICD-10-CM

## 2022-10-14 DIAGNOSIS — G57.81 DISORDER OF RIGHT SURAL NERVE: ICD-10-CM

## 2022-10-14 PROCEDURE — 7100000011 HC PHASE II RECOVERY - ADDTL 15 MIN: Performed by: PODIATRIST

## 2022-10-14 PROCEDURE — 3600000002 HC SURGERY LEVEL 2 BASE: Performed by: PODIATRIST

## 2022-10-14 PROCEDURE — 2500000003 HC RX 250 WO HCPCS

## 2022-10-14 PROCEDURE — 2500000003 HC RX 250 WO HCPCS: Performed by: PODIATRIST

## 2022-10-14 PROCEDURE — 2500000003 HC RX 250 WO HCPCS: Performed by: ANESTHESIOLOGIST ASSISTANT

## 2022-10-14 PROCEDURE — 6360000002 HC RX W HCPCS: Performed by: ANESTHESIOLOGIST ASSISTANT

## 2022-10-14 PROCEDURE — 2709999900 HC NON-CHARGEABLE SUPPLY: Performed by: PODIATRIST

## 2022-10-14 PROCEDURE — 3700000001 HC ADD 15 MINUTES (ANESTHESIA): Performed by: PODIATRIST

## 2022-10-14 PROCEDURE — 88304 TISSUE EXAM BY PATHOLOGIST: CPT

## 2022-10-14 PROCEDURE — 3600000012 HC SURGERY LEVEL 2 ADDTL 15MIN: Performed by: PODIATRIST

## 2022-10-14 PROCEDURE — 2580000003 HC RX 258: Performed by: PODIATRIST

## 2022-10-14 PROCEDURE — 3700000000 HC ANESTHESIA ATTENDED CARE: Performed by: PODIATRIST

## 2022-10-14 PROCEDURE — 7100000010 HC PHASE II RECOVERY - FIRST 15 MIN: Performed by: PODIATRIST

## 2022-10-14 RX ORDER — MIDAZOLAM HYDROCHLORIDE 1 MG/ML
INJECTION INTRAMUSCULAR; INTRAVENOUS PRN
Status: DISCONTINUED | OUTPATIENT
Start: 2022-10-14 | End: 2022-10-14 | Stop reason: SDUPTHER

## 2022-10-14 RX ORDER — SODIUM CHLORIDE 0.9 % (FLUSH) 0.9 %
5-40 SYRINGE (ML) INJECTION EVERY 12 HOURS SCHEDULED
Status: DISCONTINUED | OUTPATIENT
Start: 2022-10-14 | End: 2022-10-14 | Stop reason: HOSPADM

## 2022-10-14 RX ORDER — KETAMINE HYDROCHLORIDE 10 MG/ML
INJECTION, SOLUTION INTRAMUSCULAR; INTRAVENOUS PRN
Status: DISCONTINUED | OUTPATIENT
Start: 2022-10-14 | End: 2022-10-14 | Stop reason: SDUPTHER

## 2022-10-14 RX ORDER — SODIUM CHLORIDE 9 MG/ML
INJECTION, SOLUTION INTRAVENOUS PRN
Status: DISCONTINUED | OUTPATIENT
Start: 2022-10-14 | End: 2022-10-14 | Stop reason: HOSPADM

## 2022-10-14 RX ORDER — SODIUM CHLORIDE 9 MG/ML
INJECTION, SOLUTION INTRAVENOUS PRN
Status: CANCELLED | OUTPATIENT
Start: 2022-10-14

## 2022-10-14 RX ORDER — SODIUM CHLORIDE 0.9 % (FLUSH) 0.9 %
5-40 SYRINGE (ML) INJECTION EVERY 12 HOURS SCHEDULED
Status: CANCELLED | OUTPATIENT
Start: 2022-10-14

## 2022-10-14 RX ORDER — GABAPENTIN 100 MG/1
100 CAPSULE ORAL 2 TIMES DAILY
Qty: 60 CAPSULE | Refills: 0 | Status: SHIPPED | OUTPATIENT
Start: 2022-10-14 | End: 2022-11-13

## 2022-10-14 RX ORDER — SODIUM CHLORIDE 0.9 % (FLUSH) 0.9 %
5-40 SYRINGE (ML) INJECTION PRN
Status: CANCELLED | OUTPATIENT
Start: 2022-10-14

## 2022-10-14 RX ORDER — FENTANYL CITRATE 50 UG/ML
25 INJECTION, SOLUTION INTRAMUSCULAR; INTRAVENOUS EVERY 5 MIN PRN
Status: CANCELLED | OUTPATIENT
Start: 2022-10-14

## 2022-10-14 RX ORDER — BUPIVACAINE HYDROCHLORIDE 5 MG/ML
INJECTION, SOLUTION EPIDURAL; INTRACAUDAL PRN
Status: DISCONTINUED | OUTPATIENT
Start: 2022-10-14 | End: 2022-10-14 | Stop reason: ALTCHOICE

## 2022-10-14 RX ORDER — FENTANYL CITRATE 50 UG/ML
50 INJECTION, SOLUTION INTRAMUSCULAR; INTRAVENOUS EVERY 5 MIN PRN
Status: CANCELLED | OUTPATIENT
Start: 2022-10-14

## 2022-10-14 RX ORDER — SODIUM CHLORIDE 0.9 % (FLUSH) 0.9 %
5-40 SYRINGE (ML) INJECTION PRN
Status: DISCONTINUED | OUTPATIENT
Start: 2022-10-14 | End: 2022-10-14 | Stop reason: HOSPADM

## 2022-10-14 RX ORDER — PROPOFOL 10 MG/ML
INJECTION, EMULSION INTRAVENOUS PRN
Status: DISCONTINUED | OUTPATIENT
Start: 2022-10-14 | End: 2022-10-14 | Stop reason: SDUPTHER

## 2022-10-14 RX ORDER — ONDANSETRON 2 MG/ML
4 INJECTION INTRAMUSCULAR; INTRAVENOUS
Status: CANCELLED | OUTPATIENT
Start: 2022-10-14 | End: 2022-10-15

## 2022-10-14 RX ORDER — FENTANYL CITRATE 50 UG/ML
INJECTION, SOLUTION INTRAMUSCULAR; INTRAVENOUS PRN
Status: DISCONTINUED | OUTPATIENT
Start: 2022-10-14 | End: 2022-10-14 | Stop reason: SDUPTHER

## 2022-10-14 RX ADMIN — FENTANYL CITRATE 50 MCG: 50 INJECTION, SOLUTION INTRAMUSCULAR; INTRAVENOUS at 07:14

## 2022-10-14 RX ADMIN — SODIUM CHLORIDE: 9 INJECTION, SOLUTION INTRAVENOUS at 06:54

## 2022-10-14 RX ADMIN — PROPOFOL 50 MG: 10 INJECTION, EMULSION INTRAVENOUS at 07:09

## 2022-10-14 RX ADMIN — FENTANYL CITRATE 50 MCG: 50 INJECTION, SOLUTION INTRAMUSCULAR; INTRAVENOUS at 07:58

## 2022-10-14 RX ADMIN — PROPOFOL 100 MCG/KG/MIN: 10 INJECTION, EMULSION INTRAVENOUS at 07:10

## 2022-10-14 RX ADMIN — KETAMINE HYDROCHLORIDE 30 MG: 10 INJECTION INTRAMUSCULAR; INTRAVENOUS at 07:09

## 2022-10-14 RX ADMIN — MIDAZOLAM 2 MG: 1 INJECTION INTRAMUSCULAR; INTRAVENOUS at 07:00

## 2022-10-14 ASSESSMENT — PAIN DESCRIPTION - ORIENTATION
ORIENTATION: RIGHT
ORIENTATION: RIGHT

## 2022-10-14 ASSESSMENT — PAIN DESCRIPTION - LOCATION
LOCATION: FOOT
LOCATION: FOOT

## 2022-10-14 ASSESSMENT — PAIN SCALES - GENERAL
PAINLEVEL_OUTOF10: 0
PAINLEVEL_OUTOF10: 0

## 2022-10-14 ASSESSMENT — PAIN - FUNCTIONAL ASSESSMENT: PAIN_FUNCTIONAL_ASSESSMENT: 0-10

## 2022-10-14 ASSESSMENT — PAIN DESCRIPTION - DESCRIPTORS: DESCRIPTORS: SHARP

## 2022-10-14 ASSESSMENT — PAIN DESCRIPTION - PAIN TYPE: TYPE: SURGICAL PAIN

## 2022-10-14 NOTE — ANESTHESIA POSTPROCEDURE EVALUATION
Department of Anesthesiology  Postprocedure Note    Patient: Jed Maynard  MRN: [id number]  YOB: 1960  Date of evaluation: 10/14/2022      Procedure Summary     Date: 10/14/22 Room / Location: [facility] OR 07 / [facility]    Anesthesia Start: 1537 Anesthesia Stop: 0805    Procedure: SURAL NERVE RESECTION RIGHT FOOT (Right: Ankle) Diagnosis:       Residual foreign body in soft tissue      (Residual foreign body in soft tissue [M79.5])    Surgeons: Starr Ellsworth DPM Responsible Provider: Jacquelyn Valles DO    Anesthesia Type: MAC ASA Status: 3          Anesthesia Type: MAC    Jasen Phase I:      Jasen Phase II: Jasen Score: 10      Anesthesia Post Evaluation    Patient location during evaluation: PACU  Patient participation: complete - patient participated  Level of consciousness: awake and alert  Pain score: 1  Airway patency: patent  Nausea & Vomiting: no nausea and no vomiting  Complications: no  Cardiovascular status: hemodynamically stable  Respiratory status: acceptable  Hydration status: euvolemic

## 2022-10-14 NOTE — PROGRESS NOTES
CLINICAL PHARMACY NOTE: MEDS TO BEDS    Total # of Prescriptions Filled: 1   The following medications were delivered to the patient:  Gabapentin 100    Additional Documentation:

## 2022-10-14 NOTE — DISCHARGE INSTRUCTIONS
Home Care    Follow these guidelines after surgery:   Rest. Try to move as tolerated. A mix of rest and light activity improves healing. The incision area may be sore for a few days. To minimize pain and soreness: Take pain medicine as directed. Avoid weight bearing to the operative extremity until cleared with physician    Diet    You can return to your regular diet. You may work with a dietician who will help you follow a heart-healthy diet. Physical Activity             Ask your doctor when you will be able to return to work. Do not drive unless your doctor has given you permission to do so. When taking medicines:   Take your medicine as directed. Do not change the amount or the schedule. Do not stop taking them without talking to your doctor. Do not share them. Know what results and side effects to look for. Report them to your doctor. Some drugs can be dangerous when mixed. Talk to a doctor or pharmacist if you are taking more than one drug. This includes over-the-counter medicines and herb or dietary supplements. Plan ahead for refills so you do not run out. Lifestyle Changes    You and your doctor will plan lifestyle changes that will help you recover. Some things to keep in mind include: Atherosclerosis and high blood pressure should be carefully managed. This can be done with medicines and a healthy lifestyle. If you smoke, talk to your doctor about quitting.      Follow-up   Call Your Doctor If Any of the Following Occurs   After you leave the hospital, call your doctor if any of the following occurs:   Redness, swelling, increasing pain, excessive bleeding, or discharge at the incision site   Signs of infection, including fever and chills   Any change of color or sensation in your legs or feet   Burning, pain, or other problems when urinating   Nausea or vomiting   Abdominal cramps or diarrhea   Unusual fatigue or depression   Disorientation or confusion   Numbness or tingling in the legs   New, unexplained symptoms   Cough   Call 911 or go to the emergency room right away if you have:   Shortness of breath   Chest pain   If you think you have an emergency,  CALL 911. Medications  -Take prescription medications only as directed  -If pain is not severe, you may take the non-prescription medication that you normally take.   -If any side effects or adverse reactions occur, discontinue the medication and contact your doctor.  -Review the patient drug information leaflet, when provided, before you begin taking the medication    Ambulation and Activity  -You are advised to go directly home from the hospital  -Use the prescribed walking aids  -Surgical shoe or boot  - Leg/Foot.  -Do no lift or move heavy objects  -Do not Drive    Post Anesthesia Instructions  -Do not drive or operate machinery  -Do not consume alcohol, tranquilizers, sleeping medications, or a non-prescription pain medication  -Do not make important decisions  -You should have someone home with you tonight    Care of the Operative Site  -Keep cast or bandage clean, dry, and intact  -Do not shower  -Do not remove bandage  -Elevate Operative extremity as much as possible to reduce swelling and discomfort for the first 72 hours  -Apply ice bag wrapped in thick towel over bandage 20 minutes of every hour for the first 72 hours while awake  -Do not attempt to put anything between the cast or dressing and skin, some itching is normal    Special Instructions: Call doctor immediately if you develop any of the following:  -Fever over 100 degrees by mouth - take your temperature daily  -Pain not relieved by medication ordered  -Swelling, increased redness, warmth, or hardness around operative area  -Numb, tingling or cold toes  -Toe(s) become white or bluish  -Bandage becomes wet, soiled, or blood soaked (a small amount of bleeding may be normal)  -Increasing or progressive drainage from surgical area    Follow up - Call   Colette M[facility]ull[facility] office for f/u appointment.  Cell phone  [phone]

## 2022-10-14 NOTE — ANESTHESIA PRE PROCEDURE
Department of Anesthesiology  Preprocedure Note       Name:  Thi Mercado   Age:  65 y.o.  :  1960                                          MRN:  [id number]         Date:  10/14/2022      Surgeon: Rimma Paerl):  Chidi Rider DPM    Procedure: Procedure(s):  SURAL NERVE RESECTION RIGHT FOOT REMOVAL FOREIGN BODY RIGHT FOOT   +++LATEX ALLERGY+++    Medications prior to admission:   Prior to Admission medications    Medication Sig Start Date End Date Taking? Authorizing Provider   Omega-3 Fatty Acids (FISH OIL) 1000 MG capsule Take by mouth daily    Historical Provider, MD   doxycycline monohydrate (ADOXA) 100 MG tablet Take 1 tablet by mouth 2 times daily for 10 days 10/4/22 10/14/22  Bruno Godinez DO       Current medications:    No current facility-administered medications for this visit. No current outpatient medications on file. Facility-Administered Medications Ordered in Other Visits   Medication Dose Route Frequency Provider Last Rate Last Admin    sodium chloride flush 0.9 % injection 5-40 mL  5-40 mL IntraVENous 2 times per day Chidi Rider DPM        sodium chloride flush 0.9 % injection 5-40 mL  5-40 mL IntraVENous PRN Chidi Rider DPM        0.9 % sodium chloride infusion   IntraVENous PRN Chidi Rider DPM        ceFAZolin (ANCEF) 2,000 mg in sterile water 20 mL IV syringe  2,000 mg IntraVENous On Call to Lulu Chaney 272, DPM           Allergies:     Allergies   Allergen Reactions    Latex      Leaves marks on skin    Aspirin Hives and Shortness Of Breath     sick  Other reaction(s): Disease type AND/OR category unknown (finding), GI Upset    Bee Venom Anaphylaxis     Other reaction(s): Disease type AND/OR category unknown (finding)    Codeine Hives and Shortness Of Breath     fatique  Other reaction(s): Disease type AND/OR category unknown (finding)    Penicillins Hives and Shortness Of Breath     swelling  Other reaction(s): Disease type AND/OR category unknown (finding), GI Upset  headaches      Soap Hives and Shortness Of Breath    Sulfa Antibiotics      Family history        Problem List:    Patient Active Problem List   Diagnosis Code    Hypothyroidism E03.9    Abnormal EKG R94.31    Disorder of ligament of ankle, right M24.271    Floating ossicle of ankle R29.898    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    Spontaneous rupture of flexor tendon of right foot M66.371       Past Medical History:        Diagnosis Date    Asthma     well controlled per patient    Right foot pain     For OR 10/14/22       Past Surgical History:        Procedure Laterality Date    ANKLE SURGERY Right 2021    REPAIR ANTERIOR TALAR FIBULAR LIGAMENT RIGHT FOOT REPAIR PERONEAL TENDON RIGHT FOOT, SURAL NERVE LYSIS  W PRP performed by Starr Ellsworth DPM at [facility] Right     Tendon repair    HYSTERECTOMY (CERVIX STATUS UNKNOWN)      KNEE SURGERY Bilateral     MENISCECTOMY Right        Social History:    Social History     Tobacco Use    Smoking status: Former     Types: Cigarettes     Quit date: 1996     Years since quittin.5    Smokeless tobacco: Never   Substance Use Topics    Alcohol use: Not Currently                                Counseling given: Not Answered      Vital Signs (Current): There were no vitals filed for this visit.                                            BP Readings from Last 3 Encounters:   10/14/22 (!) 162/92   10/04/22 136/88   22 126/79       NPO Status:                                                                                 BMI:   Wt Readings from Last 3 Encounters:   10/14/22 190 lb (86.2 kg)   10/04/22 189 lb 9.6 oz (86 kg)   22 189 lb (85.7 kg)     There is no height or weight on file to calculate BMI.    CBC:   Lab Results   Component Value Date/Time    WBC 6.1 2022 04:43 PM    RBC 4.70 2022 04:43 PM    HGB 13.5 2022 04:43 PM    HCT 41.6 2022 04:43 PM    MCV 88.5 05/03/2022 04:43 PM    RDW 13.0 05/03/2022 04:43 PM     05/03/2022 04:43 PM       CMP:   Lab Results   Component Value Date/Time     05/03/2022 04:43 PM    K 3.9 05/03/2022 04:43 PM     05/03/2022 04:43 PM    CO2 23 05/03/2022 04:43 PM    BUN 14 05/03/2022 04:43 PM    CREATININE 0.8 05/03/2022 04:43 PM    GFRAA >60 05/03/2022 04:43 PM    LABGLOM >60 05/03/2022 04:43 PM    GLUCOSE 125 05/03/2022 04:43 PM    PROT 7.2 05/03/2022 04:43 PM    CALCIUM 9.3 05/03/2022 04:43 PM    BILITOT 0.2 05/03/2022 04:43 PM    ALKPHOS 80 05/03/2022 04:43 PM    AST 19 05/03/2022 04:43 PM    ALT 17 05/03/2022 04:43 PM       POC Tests: No results for input(s): POCGLU, POCNA, POCK, POCCL, POCBUN, POCHEMO, POCHCT in the last 72 hours. Coags: No results found for: PROTIME, INR, APTT    HCG (If Applicable): No results found for: PREGTESTUR, PREGSERUM, HCG, HCGQUANT     ABGs: No results found for: PHART, PO2ART, EOF7HQS, JEE3CUK, BEART, N9GKRXZL     Type & Screen (If Applicable):  No results found for: LABABO, LABRH    Drug/Infectious Status (If Applicable):  No results found for: HIV, HEPCAB    COVID-19 Screening (If Applicable):   Lab Results   Component Value Date/Time    COVID19 Not Detected 04/26/2021 06:06 AM           Anesthesia Evaluation  Patient summary reviewed no history of anesthetic complications:   Airway: Mallampati: III  TM distance: >3 FB   Neck ROM: full  Mouth opening: > = 3 FB   Dental: normal exam         Pulmonary: breath sounds clear to auscultation  (+) asthma:                            Cardiovascular:Negative CV ROS            Rhythm: regular  Rate: normal           Beta Blocker:  Not on Beta Blocker         Neuro/Psych:   Negative Neuro/Psych ROS              GI/Hepatic/Renal: Neg GI/Hepatic/Renal ROS            Endo/Other:    (+) hypothyroidism::., .                 Abdominal:             Vascular: negative vascular ROS.          Other Findings:             Anesthesia Plan      MAC     ASA 3 Induction: intravenous. MIPS: Postoperative opioids intended and Prophylactic antiemetics administered. Anesthetic plan and risks discussed with patient and mother. Plan discussed with CRNA. Charmaine Hutton DO   10/14/2022     DOS STAFF ADDENDUM:    Pt seen and examined, physical exam updated, chart reviewed including anesthesia, drug and allergy history. H&P reviewed. No interval changes to history or physical examination (unless noted above). NPO status confirmed. Anesthetic plan, risks, benefits, alternatives discussed with patient. Patient verbalized an understanding and agrees to proceed.      Charmaine Hutton DO  Staff Anesthesiologist  6:45 AM

## 2022-10-14 NOTE — OP NOTE
Operative Note      Patient: Rachelle Calvert  YOB: 1960  MRN: [id number]    Date of Procedure: 10/14/2022    Pre-Op Diagnosis: Residual foreign body in soft tissue [M79.5]    Post-Op Diagnosis: Same       Procedure(s):  SURAL NERVE RESECTION RIGHT FOOT REMOVAL FOREIGN BODY RIGHT FOOT   +++LATEX ALLERGY+++    Surgeon(s):  Ulises Mccarty DPM    Assistant:   Resident: Amiel Essex, MD [employer]  Anesthesia: Monitor Anesthesia Care    Estimated Blood Loss (mL): Minimal    Complications: None    Specimens:   ID Type Source Tests Collected by Time Destination   A : Sural Nerve Tissue Tissue SURGICAL PATHOLOGY Ulises Mccarty DPM 10/14/2022 0727        Implants:  * No implants in log *      Drains: * No LDAs found *    Findings: Scar tissue surrounding Right ankle sural nerve    Detailed Description of Procedure: The patient was properly identified and brought into the operating room with her right lower extremity properly marked. She was then transferred to the OR table and left in the supine position. A thigh tourniquet was then applied to her Right lower extremity. Following administration of MAC sedation, the right lower extremity was scrubbed, prepped, and draped in the usual sterile and aseptic manner. A local block of 0.5% Marcaine was then infiltrated about the lateral ankle. Tourniquet was then inflated. Attention was then directed to the lateral aspect of the right ankle where a fresh #15 blade was utilized to create a 4 cm incision was created over the sural nerve. This was deepened to the level of the sural nerve. The sural nerve was quickly identified and scar tissue was adequately removed and decompression achieved. A sample of tissue was then resected and sent to pathology for further evaluation. At this point a second incision was created medial to the initial incision on the anterior ankle.  This was deepened to the level of ligament where  original surgery was performed and where patient was experiencing irritation. After careful exploration, the foreign material causing her irritation was unable to be found. Further exploration performed but also unable to discover source of her irritation of lateral ankle. The wounds were then flushed with copious amounts of normal sterile saline. A post-op block was then performed using 0.5% Marcaine solution. The initial wound was closed deep with Vicryl subQ sutures and closed superficially with 4-0 Nylon. The second incision was closed with 4-0 Nylon on skin only. Tourniquet was deflated at this time. A post-op dressing consisting of 'jump-start', 4x4 gauze, Kerlix, and ACE was then applied. Neurovascular status remained intact to patient's right lower extremity. She tolerated the procedure and anesthesia quite well without complication. She was transferred to PACU where she will reman for a brief period of time before being discharged to home.       Electronically signed by Anai Galeano MD on 10/14/2022 at 8:00 AM no

## 2022-10-14 NOTE — BRIEF OP NOTE
Brief Postoperative Note      Patient: Nohelia Angel  YOB: 1960  MRN: [id number]    Date of Procedure: 10/14/2022    Pre-Op Diagnosis: Residual foreign body in soft tissue [M79.5]    Post-Op Diagnosis: Same       Procedure(s):  SURAL NERVE RESECTION RIGHT FOOT REMOVAL FOREIGN BODY RIGHT FOOT   +++LATEX ALLERGY+++    Surgeon(s):  Kalia Craft DPM    Assistant:  Resident: Tammy Dyer MD  [facility]  Anesthesia: Monitor Anesthesia Care    Estimated Blood Loss (mL): less than 50     Complications: None    Specimens:   ID Type Source Tests Collected by Time Destination   A : Sural Nerve Tissue Tissue SURGICAL PATHOLOGY Kalia Craft DPM 10/14/2022 0727        Implants:  * No implants in log *      Drains: * No LDAs found *    Findings: Sural nerve scar tissue surrounding of Right ankel    Electronically signed by Tammy Dyer MD on 10/14/2022 at 7:59 AM

## 2022-10-14 NOTE — PROGRESS NOTES
PT TOELRATING PO , DISCHARGE INSTRUCTIONS GIVEN TO PT AND HER MOTHER VERBALIZED UNDERSTANDING OF INSTRUCTIONS [facility] PHLETS GIVEN WAIITSTEVE FOR PRESCRIPTION TO BE FILLED

## 2022-10-17 ENCOUNTER — OFFICE VISIT (OUTPATIENT)
Dept: PODIATRY | Age: 62
End: 2022-10-17

## 2022-10-17 VITALS — WEIGHT: 190 LBS | BODY MASS INDEX: 32.61 KG/M2 | TEMPERATURE: 97.8 F

## 2022-10-17 DIAGNOSIS — Z09 POSTOPERATIVE EXAMINATION: Primary | ICD-10-CM

## 2022-10-17 PROCEDURE — 99024 POSTOP FOLLOW-UP VISIT: CPT | Performed by: PODIATRIST

## 2022-10-17 NOTE — PROGRESS NOTES
Postop Progress Note    Subjective    Tawanda Mena presents to the office 3 days  following ankle sx . The patient is not having any pain. Objective    Vitals:    10/17/22 1425   Temp: 97.8 °F (36.6 °C)     General: alert, cooperative, and no distress  Incision: healing well, no drainage, no erythema, no hernia, no seroma, no swelling, well approximated    Assessment    Doing well postoperatively. Plan   1. Continue any current medications  2. Wound care discussed  3. Wound/Incision: healing well  4. Disposition:   Limited activities. No heavy lifting. No strenuous exercise. Should not return to gym class or sports until cleared by a physician. 5. Diet: regular diet  6. Follow up: 2 week.            Electronically signed by Tammy Tuttle DPM on 10/17/2022 at 2:38 PM

## 2022-10-27 ENCOUNTER — OFFICE VISIT (OUTPATIENT)
Dept: PODIATRY | Age: 62
End: 2022-10-27

## 2022-10-27 VITALS — WEIGHT: 190 LBS | BODY MASS INDEX: 32.61 KG/M2 | TEMPERATURE: 98.2 F

## 2022-10-27 DIAGNOSIS — Z09 POSTOPERATIVE EXAMINATION: Primary | ICD-10-CM

## 2022-10-27 PROCEDURE — 99024 POSTOP FOLLOW-UP VISIT: CPT | Performed by: PODIATRIST

## 2022-10-27 NOTE — PROGRESS NOTES
Postop Progress Note    Subjective    Sonia Riser presents to the office 2 weeks  following ankle . The patient is not having any pain. Objective    Vitals:    10/27/22 1619   Temp: 98.2 °F (36.8 °C)     General: alert, cooperative, and no distress  Incision: healing well    Assessment    Doing well postoperatively. Plan   1. Continue any current medications  2. Wound care discussed  3. Wound/Incision: healing well, sutures removed  4. Disposition:   Limited activities. No heavy lifting. No strenuous exercise. Should not return to gym class or sports until cleared by a physician. 5. Diet: regular diet  6. Follow up: 2 week.            Electronically signed by Ann Marie Bailey DPM on 10/27/2022 at 4:32 PM

## 2022-10-27 NOTE — LETTER
[place]  [address] [facility] [address]  Phone: [phone]  Fax: [phone]        October 27, 2022     Patient: Nohelia Angel   YOB: 1960   Date of Visit: 10/27/2022       To Whom it May Concern:    George Garcia was seen in my clinic on 10/27/2022. She is to be off of work from 10/14/22 until 11/28/22 due to recent foot surgery     If you have any questions or concerns, please don't hesitate to call.     Sincerely,         Kalia Craft DPM

## 2022-11-14 ENCOUNTER — OFFICE VISIT (OUTPATIENT)
Dept: PODIATRY | Age: 62
End: 2022-11-14

## 2022-11-14 VITALS — BODY MASS INDEX: 32.61 KG/M2 | TEMPERATURE: 98.2 F | WEIGHT: 190 LBS

## 2022-11-14 DIAGNOSIS — G57.81 DISORDER OF RIGHT SURAL NERVE: Primary | ICD-10-CM

## 2022-11-14 DIAGNOSIS — Z09 POSTOPERATIVE EXAMINATION: ICD-10-CM

## 2022-11-14 PROCEDURE — 99024 POSTOP FOLLOW-UP VISIT: CPT | Performed by: PODIATRIST

## 2022-11-14 RX ORDER — L-METHYLFOLATE-ALGAE-VIT B12-B6 CAP 3-90.314-2-35 MG 3-90.314-2-35 MG
1 CAP ORAL DAILY
Qty: 60 CAPSULE | Refills: 2 | Status: SHIPPED
Start: 2022-11-14 | End: 2022-11-28 | Stop reason: SDUPTHER

## 2022-11-14 NOTE — PROGRESS NOTES
Postop Progress Note    Subjective    Real Mead presents to the office 4 weeks  following ankle . Pain is controlled without any medications. Objective    Vitals:    11/14/22 0757   Temp: 98.2 °F (36.8 °C)     General: alert, cooperative, and no distress  Incision: healing well    Assessment    Doing well postoperatively. Encounter Diagnosis   Name Primary? Postoperative examination Yes          Plan   1. Continue any current medications  2. Wound care discussed  3. Wound/Incision: healing well  4. Disposition:   Limited activities. No heavy lifting. No strenuous exercise. Should remain out of work until January 2nd .  5. Diet: regular diet  6. Follow up: 3 weeks.            Electronically signed by Baron Forte DPM on 11/14/2022 at 8:05 AM

## 2022-11-16 ENCOUNTER — EVALUATION (OUTPATIENT)
Dept: PHYSICAL THERAPY | Age: 62
End: 2022-11-16
Payer: COMMERCIAL

## 2022-11-16 DIAGNOSIS — G57.81 DISORDER OF RIGHT SURAL NERVE: ICD-10-CM

## 2022-11-16 DIAGNOSIS — Z09 POSTOPERATIVE EXAMINATION: Primary | ICD-10-CM

## 2022-11-16 PROCEDURE — 97161 PT EVAL LOW COMPLEX 20 MIN: CPT | Performed by: PHYSICAL THERAPIST

## 2022-11-16 NOTE — PROGRESS NOTES
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 Has had 4 ankle surgeries. Most recent was . Pt is NWB and using scooter entering clinic. Pt spoke with doctor in person, just before evaluation, and is to wear boot but not wearing today. Clarified during session with doctor;  pt is WBAT with boot on. Previous PT: yes - helped    Medical Management for Current Problem:  no medication    Chief complaint: pain, decreased motion, weakness    Behavior: condition is staying the same    Pain:   Current: 5/10     Best: 5/10     Worst:9/10    Symptom Type/Quality: tingling, pinching   Location[de-identified] Ankle: lateral side      Relieved by:  elevation    Imaging results: No results found.     Past Medical History  Past Medical History:   Diagnosis Date    Asthma     well controlled per patient    Right foot pain     For OR 10/14/22     Past Surgical History:   Procedure Laterality Date    ANKLE SURGERY Right 2021    REPAIR ANTERIOR TALAR FIBULAR LIGAMENT RIGHT FOOT REPAIR PERONEAL TENDON RIGHT FOOT, SURAL NERVE LYSIS  W PRP performed by Charley Villa DPM at [address] Right     Tendon repair    HYSTERECTOMY (CERVIX STATUS UNKNOWN)      KNEE SURGERY Bilateral     LEG SURGERY Right 10/14/2022    SURAL NERVE RESECTION RIGHT FOOT performed by Charley Villa DPM at [facility]        Medications:   Current Outpatient Medications   Medication Sig Dispense Refill    L-methylfolate-B6-B12 (Reshma Budds) 2-75.721-6-85 MG CAPS capsule Take 1 capsule by mouth daily 60 capsule 2    Omega-3 Fatty Acids (FISH OIL) 1000 MG capsule Take by mouth daily       No current facility-administered medications for this visit. 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 Physical demands include: lifting, carrying, pushing, pulling medium weighted items (20 - 50 lbs Occasionally), walking, standing. Status:  off currently was light duty prior to surgery. Exercise regimen:  long distance walk    Hobbies: gardening     Patient Goals: pain relief, return to prior activity    Precautions/Contraindications: recent surgery, WBAT with boot - verbally per Dr. Nadege Angela    OBJECTIVE:     Observations: well nourished female      Edema: figure 8     R: 53 cm   L:53 cm     Gait:  using knee scooter . Did not assess gait due to pt didn't  have boot with her today. Joint/Motion:    Ankle:  Right:   AROM: 5° Dorsiflexion,  45° Plantarflexion, 8° Inversion, 5° Eversion     Left:   AROM: 20° Dorsiflexion,  50° Plantarflexion, 20° Inversion, 10° Eversion      Strength:     Ankle:  Right: grossly 3/5 - did not formally test.   Left: Dorsiflexion 5/5, Plantarflexion 5/5, Inversion 5/5, Eversion 5/5    Palpation: Tender to palpation around lateral malleolus over /around incisions     Special Tests/Functional Screens: NT d/t postop   [] Anterior Drawer []+ / [] -  [] Eversion Stress: []+ / [] -  [] Butler Test []+ / [] -     [] Tib-Fib Compression Test []+ / [] -    [] Inversion Stress []+ / [] -     [] Squeeze Test []+ / [] -   [] Marie's Sign []+ / [] -   [] Other: []+ / []          ASSESSMENT     Outcome Measure:   Lower Extremity Functional Scale (LEFS) 15/80 impairment    Problems:   Pain reported 5-9/10  ROM decreased  Strength decreased   Decreased functional ability with walking, stairs, standing, work     [x] There are no barriers affecting plan of care or recovery    [] Barriers to this patient's plan of care or recovery include. Domestic Concerns:  [x] No  [] Yes:    Short Term goals (3 weeks)  Decrease reported pain to 0-6/10  Increase ROM to AROM: 10° Dorsiflexion,  50° Plantarflexion, 10° Inversion, 8° Eversion   Increase Strength to 4/5   Able to perform/complete the following functions/tasks: Pt able to walk 20 minutes with min pain/limitation. Pt able to go up/down 5 steps with min pain/limitation. Pt able to stand 15 + minutes with min pain/limitation. Lower Extremity Functional Scale (LEFS) 35/80 impairment    Long Term goals (6 weeks)  Decrease reported pain to 0-3/10  Increase ROM to AROM: 20° Dorsiflexion,  50° Plantarflexion, 20° Inversion, 10 ° Eversion   Increase strength to 4+ to 5/5   Able to complete the following functions/tasks: pt able to walk 40 + minutes with no to min pain/limitation. Pt able to go up/down flight of steps with no pain/limitation. Pt able to stand 20+ minutes with no pain/limitation. LEFS  50/80 impairment  Independent with home exercise program (HEP)    Rehab Potential: [x] Good  [] Fair  [] Poor    PLAN       Treatment Plan:   [x] Therapeutic Exercise  [x] Therapeutic Activity  [x] Neuromuscular Re-education   [x] Gait Training  [x] Balance Training  [x] Aerobic conditioning  [x] Manual Therapy  [x] Massage/Fascial release   [] Work/Sport specific activities    [] Pain Neuroscience [x] Cold/hotpack  [] Vasocompression  [] Electrical Stimulation  [] Lumbar/Cervical Traction  [] Ultrasound   [] Iontophoresis: 4 mg/mL Dexamethasone Sodium Phosphate 40-80 mAmin        [x] Instruction in HEP      []  Medication allergies reviewed for use of Dexamethasone Sodium Phosphate 4mg/ml  with iontophoresis treatments. Patient is not allergic.       The following CPT codes are likely to be used in the care of this patient: [address] PT Evaluation: [address] [employer] PT Re-Evaluation   [address] Neuromuscular Re-Education   [address] [facility] Therapeutic Activities   [phone] Manual Therapy   [id number] Gait Training       Suggested Professional Referral: [x] No  [] Yes:     Patient Education:  [x] Plans/Goals, Risks/Benefits discussed  [x] Home exercise program  Method of Education: [x] Verbal  [x] Demo  [x] Written  Comprehension of Education:  [x] Verbalizes understanding. [x] Demonstrates understanding. [] Needs Review. [] Demonstrates/verbalizes understanding of HEP/Ed previously given. Frequency: 2 days per week for 6 weeks    Patient understands diagnosis/prognosis and consents to treatment, plan and goals: [x] Yes    [] No     Thank you for the opportunity to work with your patient. If you have questions or comments, please contact me at numbers listed above. Electronically signed by: Zacarias Maldonado, PT DPT, PT [id number]         Please sign Physician's Certification and return to: [address]  [address] [facility] [phone]  Dept: [phone]  Dept Fax: [phone]:  Certification / Comments     Frequency/Duration 2 days per week for 6 weeks. Certification period from 11/16/2022  to 12/30/2022. I have reviewed the Plan of Care established for skilled therapy services and certify that the services are required and that they will be provided while the patient is under my care.     Physician's Comments/Revisions:               Physician's Printed Name:                                           [de-identified] Signature:                                                               Date:

## 2022-11-16 NOTE — PROGRESS NOTES
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 Nadege Angela pt is WBAT in boot. Outcome Measure:  LEFS 15/80     S: See eval.   O:   Time 0920- 0950     Visit  Repeat outcome measure at mid point and end. Pain 5/10     ROM Ankle:  Right:   AROM: 5° Dorsiflexion,  45° Plantarflexion, 8° Inversion, 5° Eversion      Left:   AROM: 20° Dorsiflexion,  50° Plantarflexion, 20° Inversion, 10° Eversion     Modalities            Manual            Stretch                  Exercise      Bike      Ankle 4 way motion      Ankle T band      Ankle bosu motion      Ankle alphabet      SLS            TG squats      TG calf raises      Step-ups - FWD      Step-ups - LAT      Step-ups - BWD        NMR To improve balance for safe community and home ambulation    Resisted walk      FWD      BKWD      lat      March      Side stepping      Retro walk      Heel to toe      A:  Tolerated well.       P: Continue with rehab plan  Sen Savage PT DPT, PT [id number]    Treatment Charges: Mins Units   Initial Evaluation 30 1   Re-Evaluation     Ther Exercise         TE     Manual Therapy     MT     Ther Activities        TA     Gait Training          GT     Neuro Re-education NR     Modalities     Non-Billable Service Time     Other     Total Time/Units 30 1

## 2022-11-17 ENCOUNTER — TREATMENT (OUTPATIENT)
Dept: PHYSICAL THERAPY | Age: 62
End: 2022-11-17

## 2022-11-17 DIAGNOSIS — G57.81 DISORDER OF RIGHT SURAL NERVE: Primary | ICD-10-CM

## 2022-11-17 DIAGNOSIS — Z09 POSTOPERATIVE EXAMINATION: ICD-10-CM

## 2022-11-17 NOTE — PROGRESS NOTES
[address] [facility]   Phone: [phone]             Fax: [phone]      Physical Therapy Daily Treatment Note    Date: 2022  Patient Name: Fabiano Boyer  : 1960   MRN: [id number]  DOInjury: years    DOSx: 10/14/2022 - SURAL NERVE RESECTION RIGHT FOOT REMOVAL FOREIGN BODY RIGHT FOOT   Referring Provider: No referring provider defined for this encounter. 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 Ruben Bernstein pt is WBAT in boot. Outcome Measure:  LEFS 15/80     S: Pt reports pain today 8/10 with WBing in the boot. Pt arrived using knee scooter with NWB. Educated pt on the importance of WBAT in boot. Pt reports she has not put wt on it at all in the last month. Reiterated the importance of beginning Neoga while wearing boot. Recommend pt use ww at home to gradually begin putting wt on R foot. O:   Time 720-800     Visit  Repeat outcome measure at mid point and end. Pain 8/10     ROM Ankle:  Right:   AROM: 5° Dorsiflexion,  45° Plantarflexion, 8° Inversion, 5° Eversion      Left:   AROM: 20° Dorsiflexion,  50° Plantarflexion, 20° Inversion, 10° Eversion     Modalities            Manual            Stretch      Gastroc stretch 3 x 30s     Soleous stretch  3 x 30s     Exercise      Bike 5 min     Ankle 4 way motion      Ankle T band      Ankle bosu motion df, pf, ev, iv and circles CW, CCW X 20 each      Ankle alphabet      SLS            TG squats      TG calf raises      Step-ups - FWD      Step-ups - LAT      Step-ups - BWD        NMR To improve balance for safe community and home ambulation    Resisted walk      FWD      BKWD      lat      March      Side stepping      Retro walk      Heel to toe      A:  Tolerated well. Pt instructed to use ww at home and WBAT with boot.  Long distances she can still use the knee scooter grocery shopping etc. And would like her to wean off by  next week and then would like her to discontinue scooter.       P: Continue with rehab plan  Jamar Ogden, PTA 71566    Treatment Charges: Mins Units   Initial Evaluation     Re-Evaluation     Ther Exercise         TE 40 3   Manual Therapy     MT     Ther Activities        TA     Gait Training          GT     Neuro Re-education NR     Modalities     Non-Billable Service Time     Other     Total Time/Units 40 3

## 2022-11-28 ENCOUNTER — TREATMENT (OUTPATIENT)
Dept: PHYSICAL THERAPY | Age: 62
End: 2022-11-28
Payer: COMMERCIAL

## 2022-11-28 ENCOUNTER — OFFICE VISIT (OUTPATIENT)
Dept: PODIATRY | Age: 62
End: 2022-11-28

## 2022-11-28 VITALS — BODY MASS INDEX: 32.61 KG/M2 | TEMPERATURE: 98.2 F | WEIGHT: 190 LBS

## 2022-11-28 DIAGNOSIS — S86.311D PERONEAL TENDON RUPTURE, RIGHT, SUBSEQUENT ENCOUNTER: Primary | ICD-10-CM

## 2022-11-28 DIAGNOSIS — G89.29 CHRONIC PAIN OF RIGHT ANKLE: ICD-10-CM

## 2022-11-28 DIAGNOSIS — M25.571 CHRONIC PAIN OF RIGHT ANKLE: ICD-10-CM

## 2022-11-28 DIAGNOSIS — Z09 POSTOPERATIVE EXAMINATION: Primary | ICD-10-CM

## 2022-11-28 PROCEDURE — 97110 THERAPEUTIC EXERCISES: CPT

## 2022-11-28 PROCEDURE — 99024 POSTOP FOLLOW-UP VISIT: CPT | Performed by: PODIATRIST

## 2022-11-28 RX ORDER — L-METHYLFOLATE-ALGAE-VIT B12-B6 CAP 3-90.314-2-35 MG 3-90.314-2-35 MG
1 CAP ORAL DAILY
Qty: 60 CAPSULE | Refills: 2 | Status: SHIPPED | OUTPATIENT
Start: 2022-11-28

## 2022-11-28 NOTE — PROGRESS NOTES
[facility]   Phone: [phone]             Fax: [phone]      Physical Therapy Daily Treatment Note    Date: 2022  Patient Name: Lani Glynn  : 1960   MRN: [id number]  DOInjury: years    DOSx: 10/14/2022 - SURAL NERVE RESECTION RIGHT FOOT REMOVAL FOREIGN BODY RIGHT FOOT   Referring Provider: No referring provider defined for this encounter. 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 Pt saw Dr. Liz Newell today and discontinued boot and knee scooter. O:   Time 850-928     Visit 3/12 Repeat outcome measure at mid point and end. Pain none     ROM Ankle:  Right:   AROM: 5° Dorsiflexion,  45° Plantarflexion, 8° Inversion, 5° Eversion      Left:   AROM: 20° Dorsiflexion,  50° Plantarflexion, 20° Inversion, 10° Eversion     Modalities            Manual            Stretch      Gastroc stretch 3 x 30s     Soleous stretch  3 x 30s     Exercise      Bike 8 min     Ankle 4 way motion      Ankle T band      Ankle bosu motion df, pf, ev, iv and circles CW, CCW X 20 each      Ankle alphabet      SLS      Sit to stand  2 x 10 One blue foam     TG squats      TG calf raises      Step-ups - FWD      Step-ups - LAT      Step-ups - BWD        NMR To improve balance for safe community and home ambulation    Resisted walk      FWD      BKWD      lat      March      Side stepping      Retro walk      Heel to toe      A:  Tolerated well. No c/o pain following treatment. No reports of pain or discomfort during any of today's exercises.       P: Continue with rehab plan  Chiara Mcclain, PTA 77938    Treatment Charges: Mins Units   Initial Evaluation     Re-Evaluation     Ther Exercise         TE 38 3   Manual Therapy     MT     Ther Activities        TA     Gait Training          GT     Neuro Re-education NR     Modalities     Non-Billable Service Time     Other     Total Time/Units 38 3

## 2022-11-28 NOTE — PROGRESS NOTES
Postop Progress Note    Subjective    Maria R Torres presents to the office 6 weeks  following ankle sx . Pain is controlled with current analgesics. Objective    Vitals:    11/28/22 0816   Temp: 98.2 °F (36.8 °C)     General: alert, cooperative, and no distress  Incision: healing well    Assessment    Doing well postoperatively. Plan   1. Continue any current medications  2. Wound care discussed  3. Wound/Incision: healing well  4. Disposition:   Pt is to increase activities as tolerated. Should remain out of work until until 1-1-2023 .  5. Diet: regular diet  6. Follow up: 3 weeks. Continue pt   d/c  lai Koch Feeling was seen today for post-op check. Diagnoses and all orders for this visit:    Postoperative examination    Other orders  -     L-methylfolate-B6-B12 (METANX) 0-65.182-4-01 MG CAPS capsule;  Take 1 capsule by mouth daily         Electronically signed by Melodie Hernandez DPM on 11/28/2022 at 8:26 AM

## 2022-11-28 NOTE — LETTER
[address] [place] [address]  [address]   Phone: [phone]  Fax: [place]        November 28, 2022     Patient: Romario Wiley   YOB: 1960   Date of Visit: 11/28/2022       To Whom it May Concern:    Jd Soria was seen in my clinic on 11/28/2022. She can return to work on 1/2/23 per Dr. Valdemar Sawant    If you have any questions or concerns, please don't hesitate to call.     Sincerely,         Sanjay Wiggins DPM

## 2022-11-30 ENCOUNTER — TREATMENT (OUTPATIENT)
Dept: PHYSICAL THERAPY | Age: 62
End: 2022-11-30

## 2022-11-30 DIAGNOSIS — Z09 POSTOPERATIVE EXAMINATION: Primary | ICD-10-CM

## 2022-11-30 DIAGNOSIS — G57.81 DISORDER OF RIGHT SURAL NERVE: ICD-10-CM

## 2022-11-30 NOTE — PROGRESS NOTES
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 Attributes to the rain and went shopping yesterday. Pt presents to session wearing boot but completes therapy session without it. O:   Time 5614 - 6190     Visit 3/12 Repeat outcome measure at mid point and end. Pain See above. ROM Ankle:  Right:   AROM: 5° Dorsiflexion,  45° Plantarflexion, 8° Inversion, 5° Eversion      Left:   AROM: 20° Dorsiflexion,  50° Plantarflexion, 20° Inversion, 10° Eversion     Modalities            Manual            Stretch      Gastroc stretch 3 x 30s     Soleous stretch  3 x 30s     Exercise      Bike 10  min     Ankle 4 way motion      Ankle T band 2 x 10  GTB     Ankle bosu motion df, pf, ev, iv and circles CW, CCW X 20 each      Ankle alphabet X 1      SLS      Sit to stand  2 x 10 no blue foam     TG squats      TG calf raises      Step-ups - FWD      Step-ups - LAT      Step-ups - BWD        NMR To improve balance for safe community and home ambulation    Resisted walk      FWD      BKWD      lat      March      Side stepping      Retro walk      Heel to toe      A:  Tolerated well. Pt reports a little sore end of session but a 'good sore'.       P: Continue with rehab plan  Dimitri Marti, PT DPT [id number]    Treatment Charges: Mins Units   Initial Evaluation     Re-Evaluation     Ther Exercise TE 35 2   Manual Therapy     MT     Ther Activities        TA     Gait Training          GT     Neuro Re-education NR     Modalities     Non-Billable Service Time     Other     Total Time/Units 35 2

## 2022-12-05 ENCOUNTER — TREATMENT (OUTPATIENT)
Dept: PHYSICAL THERAPY | Age: 62
End: 2022-12-05
Payer: COMMERCIAL

## 2022-12-05 DIAGNOSIS — G89.29 CHRONIC PAIN OF RIGHT ANKLE: ICD-10-CM

## 2022-12-05 DIAGNOSIS — S86.311D PERONEAL TENDON RUPTURE, RIGHT, SUBSEQUENT ENCOUNTER: ICD-10-CM

## 2022-12-05 DIAGNOSIS — M25.571 CHRONIC PAIN OF RIGHT ANKLE: ICD-10-CM

## 2022-12-05 DIAGNOSIS — S93.401D RUPTURE OF LIGAMENT OF ANKLE, RIGHT, SUBSEQUENT ENCOUNTER: Primary | ICD-10-CM

## 2022-12-05 PROCEDURE — 97110 THERAPEUTIC EXERCISES: CPT

## 2022-12-05 PROCEDURE — 97530 THERAPEUTIC ACTIVITIES: CPT

## 2022-12-05 NOTE — PROGRESS NOTES
[facility]   Phone: [phone]             Fax: [phone]      Physical Therapy Daily Treatment Note    Date: 2022  Patient Name: Navya Galaviz  : 1960   MRN: [id number]  DOInjury: years    DOSx: 10/14/2022 - SURAL NERVE RESECTION RIGHT FOOT REMOVAL FOREIGN BODY RIGHT FOOT   Referring Provider: No referring provider defined for this encounter. 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 Pt reports \"pain I gotta live with\"  O:   Time 5525 - 1391     Visit  Repeat outcome measure at mid point and end. Pain See above. ROM Ankle:  Right:   AROM: 5° Dorsiflexion,  45° Plantarflexion, 8° Inversion, 5° Eversion      Left:   AROM: 20° Dorsiflexion,  50° Plantarflexion, 20° Inversion, 10° Eversion     Modalities            Manual            Stretch      Gastroc stretch 3 x 30s     Soleous stretch  3 x 30s     Exercise      Bike 10  min     Ankle 4 way motion      Ankle T band 2 x 10  GTB     Ankle bosu motion df, pf, ev, iv and circles CW, CCW X 20 each      Ankle alphabet X 1      SLS      Sit to stand  2 x 10 no blue foam     TG squats      TG calf raises seated  2 x 10     Hip add and ext  2 x 10     Step-ups - FWD 2 x 10 4 inch     Step-ups - LAT X 10 4 inch     Step-ups - BWD        NMR To improve balance for safe community and home ambulation    Resisted walk      FWD      BKWD      lat      March            Side stepping      Retro walk      Heel to toe      A:  Tolerated well. Pt reports slight increase in pain following treatment.      P: Continue with rehab plan  Jakub Che, PTA 30020    Treatment Charges: Mins Units   Initial Evaluation     Re-Evaluation     Ther Exercise         TE 30 2   Manual Therapy     MT     Ther Activities        TA 8 1   Gait Training          GT     Neuro Re-education NR     Modalities     Non-Billable Service Time     Other     Total Time/Units 38 3

## 2022-12-07 ENCOUNTER — TREATMENT (OUTPATIENT)
Dept: PHYSICAL THERAPY | Age: 62
End: 2022-12-07

## 2022-12-07 DIAGNOSIS — G89.29 CHRONIC PAIN OF RIGHT ANKLE: ICD-10-CM

## 2022-12-07 DIAGNOSIS — M25.571 CHRONIC PAIN OF RIGHT ANKLE: ICD-10-CM

## 2022-12-07 DIAGNOSIS — G89.18 PAIN FOLLOWING SURGERY OR PROCEDURE: ICD-10-CM

## 2022-12-07 DIAGNOSIS — S93.401D RUPTURE OF LIGAMENT OF ANKLE, RIGHT, SUBSEQUENT ENCOUNTER: Primary | ICD-10-CM

## 2022-12-07 DIAGNOSIS — S86.311D PERONEAL TENDON RUPTURE, RIGHT, SUBSEQUENT ENCOUNTER: ICD-10-CM

## 2022-12-07 NOTE — PROGRESS NOTES
[facility]   Phone: [phone]             Fax: [phone]      Physical Therapy Daily Treatment Note    Date: 2022  Patient Name: Rachelle Calvert  : 1960   MRN: [id number]  DOInjury: years    DOSx: 10/14/2022 - SURAL NERVE RESECTION RIGHT FOOT REMOVAL FOREIGN BODY RIGHT FOOT   Referring Provider: No referring provider defined for this encounter. 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 Pt continues to wear CAM boot into therapy despite discontinued. She reports she \"needs it for walking a lot\". O:   Time 399 - 9605     Visit  Repeat outcome measure at mid point and end. Pain See above. ROM Ankle:  Right:   AROM: 5° Dorsiflexion,  45° Plantarflexion, 8° Inversion, 5° Eversion      Left:   AROM: 20° Dorsiflexion,  50° Plantarflexion, 20° Inversion, 10° Eversion     Modalities            Manual            Stretch      Gastroc stretch 3 x 30s     Soleous stretch  3 x 30s     Exercise      Bike 10  min     Ankle 4 way motion      Ankle T band 2 x 10  GTB     Ankle bosu motion df, pf, ev, iv and circles CW, CCW X 20 each      Ankle alphabet X 1      SLS      Sit to stand  2 x 10 no blue foam     TG squats      TG calf raises seated  2 x 10     Hip add and ext  2 x 10     Step-ups - FWD 2 x 10 4 inch     Step-ups - LAT X 10 4 inch     Step-ups - BWD        NMR To improve balance for safe community and home ambulation    Resisted walk      FWD      BKWD      lat      March      SLS 3 x 30s      Side stepping      Retro walk      Heel to toe      A:  Tolerated well. No reports of ankle discomfort during treatment, however reports B knee pain.      P: Continue with rehab plan  Leonora Gowers, PTA 99480    Treatment Charges: Mins Units   Initial Evaluation Re-Evaluation     Ther Exercise         TE 30 2   Manual Therapy     MT     Ther Activities        TA 8 1   Gait Training          GT     Neuro Re-education NR 1    Modalities     Non-Billable Service Time     Other     Total Time/Units 39 3

## 2022-12-12 ENCOUNTER — TREATMENT (OUTPATIENT)
Dept: PHYSICAL THERAPY | Age: 62
End: 2022-12-12

## 2022-12-12 DIAGNOSIS — G57.81 DISORDER OF RIGHT SURAL NERVE: ICD-10-CM

## 2022-12-12 DIAGNOSIS — Z09 POSTOPERATIVE EXAMINATION: Primary | ICD-10-CM

## 2022-12-12 NOTE — PROGRESS NOTES
[facility]   Phone: [phone]             Fax: [phone]      Physical Therapy Daily Treatment Note    Date: 2022  Patient Name: Thi Mercado  : 1960   MRN: [id number]  DOInjury: years    DOSx: 10/14/2022 - SURAL NERVE RESECTION RIGHT FOOT REMOVAL FOREIGN BODY RIGHT FOOT   Referring Provider: No referring provider defined for this encounter. 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 Pain See above. ROM Ankle:  Right:   AROM: 5° Dorsiflexion,  45° Plantarflexion, 8° Inversion, 5° Eversion      Left:   AROM: 20° Dorsiflexion,  50° Plantarflexion, 20° Inversion, 10° Eversion     Modalities            Manual            Stretch      Gastroc stretch 3 x 30s     Soleous stretch  3 x 30s     Exercise      Bike 10  min     Ankle 4 way motion      Ankle T band 2 x 10  GTB     Ankle bosu motion df, pf, ev, iv and circles CW, CCW X 20 each      Ankle alphabet     SLS      Sit to stand  2 x 10 no blue foam     TG squats      TG calf raises 2 x 10     Hip add and ext  2 x 10     Step-ups - FWD 2 x 10 6 inch     Step-ups - LAT X 10 6 inch     Step-ups - BWD        NMR To improve balance for safe community and home ambulation    Resisted walk      FWD      BKWD      lat      March      SLS     Side stepping      Retro walk      Heel to toe      A:  Tolerated well. Pt reports discomfort on lateral aspect of foot with shoes on. Pt performed therex with no shoes this session. Pt continues to wear CAM boot. As she reports discomfort in shoes. Recommend that pt try a different pain of shoes.      P: Continue with rehab plan  Cassidy Cooper PTA 56543    Treatment Charges: Mins Units   Initial Evaluation     Re-Evaluation     Ther Exercise         TE 30 2   Manual Therapy     MT     Ther Activities        TA 9 1   Gait Training          GT     Neuro Re-education NR     Modalities     Non-Billable Service Time     Other     Total Time/Units 39 3

## 2022-12-14 ENCOUNTER — TREATMENT (OUTPATIENT)
Dept: PHYSICAL THERAPY | Age: 62
End: 2022-12-14

## 2022-12-14 DIAGNOSIS — G89.18 PAIN FOLLOWING SURGERY OR PROCEDURE: Primary | ICD-10-CM

## 2022-12-14 DIAGNOSIS — G57.81 DISORDER OF RIGHT SURAL NERVE: ICD-10-CM

## 2022-12-14 NOTE — PROGRESS NOTES
[address] [facility]   Phone: [phone]             Fax: [phone]      Physical Therapy Daily Treatment Note    Date: 2022  Patient Name: Chris Allen  : 1960   MRN: [id number]  DOInjury: years    DOSx: 10/14/2022 - SURAL NERVE RESECTION RIGHT FOOT REMOVAL FOREIGN BODY RIGHT FOOT   Referring Provider: No referring provider defined for this encounter. 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 Pt continues to wear CAM boot into therapy because her regular shoes rub on that spot. Again recommended that pt stop using CAM boot and try a different shoe, to see if this helps pain. O:   Time 800 - [id number]     Visit  Repeat outcome measure at mid point and end. Pain See above. ROM Ankle:  Right:   AROM: 5° Dorsiflexion,  45° Plantarflexion, 8° Inversion, 5° Eversion      Left:   AROM: 20° Dorsiflexion,  50° Plantarflexion, 20° Inversion, 10° Eversion     Modalities            Manual            Stretch      Gastroc stretch 3 x 30s     Soleous stretch  3 x 30s     Exercise      Bike 10  min     Ankle 4 way motion      Ankle T band 2 x 10  GTB     Ankle bosu motion df, pf, ev, iv and circles CW, CCW X 20 each      Ankle alphabet     SLS      Sit to stand  2 x 10 no blue foam     TG squats      TG calf raises 2 x 10     Hip add and ext  2 x 10     Step-ups - FWD 2 x 10 6 inch     Step-ups - LAT X 10 6 inch     Step-ups - BWD        NMR To improve balance for safe community and home ambulation    Resisted walk      FWD      BKWD      lat      March      SLS     Side stepping      Retro walk      Heel to toe      A:  Tolerated well. Pt reports muscle fatigue and continued pain following treatment.      P: Continue with rehab plan  Ramona Nash [place], [place] [address]    Treatment Charges: Mins Units   Initial Evaluation     Re-Evaluation     Ther Exercise         TE 30 2   Manual Therapy     MT     Ther Activities        TA 8 1   Gait Training          GT     Neuro Re-education NR     Modalities     Non-Billable Service Time     Other     Total Time/Units 38 3

## 2022-12-19 ENCOUNTER — TREATMENT (OUTPATIENT)
Dept: PHYSICAL THERAPY | Age: 62
End: 2022-12-19
Payer: COMMERCIAL

## 2022-12-19 DIAGNOSIS — S86.311D PERONEAL TENDON RUPTURE, RIGHT, SUBSEQUENT ENCOUNTER: ICD-10-CM

## 2022-12-19 DIAGNOSIS — G89.18 PAIN FOLLOWING SURGERY OR PROCEDURE: Primary | ICD-10-CM

## 2022-12-19 PROCEDURE — 97530 THERAPEUTIC ACTIVITIES: CPT

## 2022-12-19 PROCEDURE — 97110 THERAPEUTIC EXERCISES: CPT

## 2022-12-19 NOTE — PROGRESS NOTES
[facility]   Phone: [phone]             Fax: [phone]      Physical Therapy Daily Treatment Note    Date: 2022  Patient Name: Keri Lorenzo  : 1960   MRN: [id number]  DOInjury: years    DOSx: 10/14/2022 - SURAL NERVE RESECTION RIGHT FOOT REMOVAL FOREIGN BODY RIGHT FOOT   Referring Provider: No referring provider defined for this encounter. 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 No c/o pain this morning. O:   Time 309 - [id number]     Visit  Repeat outcome measure at mid point and end. Pain See above. ROM Ankle:  Right:   AROM: 5° Dorsiflexion,  45° Plantarflexion, 8° Inversion, 5° Eversion      Left:   AROM: 20° Dorsiflexion,  50° Plantarflexion, 20° Inversion, 10° Eversion     Modalities            Manual            Stretch      Gastroc stretch 3 x 30s     Soleous stretch  3 x 30s     Exercise      Bike 10  min     Ankle 4 way motion      Ankle T band 2 x 10  GTB     Ankle bosu motion df, pf, ev, iv and circles CW, CCW X 20 each      Ankle alphabet     SLS      Sit to stand  2 x 10 no blue foam     TG squats      TG calf raises 2 x 10     Hip add and ext  2 x 10     Step-ups - FWD 2 x 10 6 inch     Step-ups - LAT X 10 6 inch     Step-ups - BWD        NMR To improve balance for safe community and home ambulation    Resisted walk      FWD      BKWD      lat      March      SLS     Side stepping      Retro walk      Heel to toe      A:  Tolerated well. No c/o pain following treatment.      P: Continue with rehab plan  Raji Oas, PTA [id number]    Treatment Charges: Mins Units   Initial Evaluation     Re-Evaluation     Ther Exercise         TE 30 2   Manual Therapy     MT     Ther Activities        TA 10 1   Gait Training          GT     Neuro Re-education NR     Modalities     Non-Billable Service Time     Other     Total Time/Units 40 3

## 2022-12-21 ENCOUNTER — OFFICE VISIT (OUTPATIENT)
Dept: PODIATRY | Age: 62
End: 2022-12-21

## 2022-12-21 ENCOUNTER — TREATMENT (OUTPATIENT)
Dept: PHYSICAL THERAPY | Age: 62
End: 2022-12-21

## 2022-12-21 VITALS — TEMPERATURE: 97.7 F | BODY MASS INDEX: 32.61 KG/M2 | WEIGHT: 190 LBS

## 2022-12-21 DIAGNOSIS — G57.81 DISORDER OF RIGHT SURAL NERVE: ICD-10-CM

## 2022-12-21 DIAGNOSIS — Z09 POSTOPERATIVE EXAMINATION: Primary | ICD-10-CM

## 2022-12-21 DIAGNOSIS — S86.311D PERONEAL TENDON RUPTURE, RIGHT, SUBSEQUENT ENCOUNTER: ICD-10-CM

## 2022-12-21 DIAGNOSIS — G89.18 PAIN FOLLOWING SURGERY OR PROCEDURE: Primary | ICD-10-CM

## 2022-12-21 PROCEDURE — 99024 POSTOP FOLLOW-UP VISIT: CPT | Performed by: PODIATRIST

## 2022-12-21 NOTE — PROGRESS NOTES
[facility]   Phone: [phone]             Fax: [phone]      Physical Therapy Daily Treatment Note    Date: 2022  Patient Name: Jamaica Hummel  : 1960   MRN: [id number]  DOInjury: years    DOSx: 10/14/2022 - SURAL NERVE RESECTION RIGHT FOOT REMOVAL FOREIGN BODY RIGHT FOOT   Referring Provider: No referring provider defined for this encounter. 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 No c/o pain this morning. O:   Time 154- 765     Visit  Repeat outcome measure at mid point and end. Pain See above. ROM Ankle:  Right:   AROM: 5° Dorsiflexion,  45° Plantarflexion, 8° Inversion, 5° Eversion      Left:   AROM: 20° Dorsiflexion,  50° Plantarflexion, 20° Inversion, 10° Eversion     Modalities            Manual            Stretch      Gastroc stretch 3 x 30s     Soleous stretch  3 x 30s     Exercise      Bike 10  min     Ankle 4 way motion      Ankle T band 2 x 10  GTB     Ankle bosu motion df, pf, ev, iv and circles CW, CCW X 20 each      Ankle alphabet     SLS      Sit to stand  2 x 10 no blue foam     TG squats      TG calf raises 2 x 10     Hip add and ext  2 x 10     Step-ups - FWD 2 x 10 6 inch     Step-ups - LAT X 10 6 inch     Step-ups - BWD        NMR To improve balance for safe community and home ambulation    Resisted walk      FWD      BKWD      lat      March      SLS     Side stepping      Retro walk      Heel to toe      A:  Tolerated well. No c/o pain following treatment.      P: Continue with rehab plan  Kelly Roy, MIO [address]    Treatment Charges: Mins Units   Initial Evaluation     Re-Evaluation     Ther Exercise         TE 30 2   Manual Therapy     MT     Ther Activities        TA 8 1   Gait Training          GT     Neuro Re-education

## 2022-12-21 NOTE — PROGRESS NOTES
Postop Progress Note    Subjective    Chris Allen presents to the office 2 months  following ankle. Pain is controlled with current analgesics. Objective    Vitals:    12/21/22 0800   Temp: 97.7 °F (36.5 °C)     General: alert, cooperative, and no distress  Incision: healing well    Assessment    Doing well postoperatively. Plan   1. Continue any current medications  2. Wound care discussed  3. Wound/Incision: healing well  4. Disposition:   Pt is to increase activities as tolerated. Should remain out of work until 1-2-2023  .  5. Diet: regular diet  6. Follow up: 2 weeks.            Electronically signed by Dwayne Malcolm DPM on 12/21/2022 at 8:09 AM

## 2022-12-21 NOTE — LETTER
[address]  [address]   Phone: [phone]  Fax: Jackelin Lee 26 December 21, 2022     Patient: Lidya Rodriguez   YOB: 1960   Date of Visit: 12/21/2022       To Whom it May Concern:    Columba Zhang was seen in my clinic on 12/21/2022. She is to be off of work until 1/9/2023 est. Date per Dr. Colette Mccullough  She will be reevaluated on 1/6/23    If you have any questions or concerns, please don't hesitate to call.     Sincerely,         Afia Albright, DPM

## 2022-12-28 ENCOUNTER — TREATMENT (OUTPATIENT)
Dept: PHYSICAL THERAPY | Age: 62
End: 2022-12-28
Payer: COMMERCIAL

## 2022-12-28 DIAGNOSIS — G89.18 PAIN FOLLOWING SURGERY OR PROCEDURE: Primary | ICD-10-CM

## 2022-12-28 DIAGNOSIS — S86.311D PERONEAL TENDON RUPTURE, RIGHT, SUBSEQUENT ENCOUNTER: ICD-10-CM

## 2022-12-28 DIAGNOSIS — G57.81 DISORDER OF RIGHT SURAL NERVE: ICD-10-CM

## 2022-12-28 PROCEDURE — 97530 THERAPEUTIC ACTIVITIES: CPT

## 2022-12-28 PROCEDURE — 97110 THERAPEUTIC EXERCISES: CPT

## 2022-12-28 NOTE — PROGRESS NOTES
[facility]   Phone: [phone]             Fax: [phone]      Physical Therapy Daily Treatment Note    Date: 2022  Patient Name: Steve Woo  : 1960   MRN: [id number]  DOInjury: years    DOSx: 10/14/2022 - SURAL NERVE RESECTION RIGHT FOOT REMOVAL FOREIGN BODY RIGHT FOOT   Referring Provider: No referring provider defined for this encounter. 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 No c/o pain this morning. O:   Time 456- 708     Visit 10/12 Repeat outcome measure at mid point and end. Pain See above. ROM Ankle:  Right:   AROM: 5° Dorsiflexion,  45° Plantarflexion, 8° Inversion, 5° Eversion      Left:   AROM: 20° Dorsiflexion,  50° Plantarflexion, 20° Inversion, 10° Eversion     Modalities            Manual            Stretch      Gastroc stretch 3 x 30s     Soleous stretch  3 x 30s     Exercise      Bike 10  min     Ankle 4 way motion      Ankle T band 2 x 10  GTB     Ankle bosu motion df, pf, ev, iv and circles CW, CCW X 20 each      Ankle alphabet     SLS      Sit to stand  2 x 10 no blue foam     TG squats      TG calf raises 2 x 10     Hip add and ext  2 x 10     Step-ups - FWD 2 x 10 6 inch     Step-ups - LAT X 10 6 inch     Step-ups - BWD        NMR To improve balance for safe community and home ambulation    Resisted walk      FWD      BKWD      lat      March      SLS     Side stepping      Retro walk      Heel to toe      A:  Tolerated well. No c/o pain following treatment.      P: Continue with rehab plan  Pretty Sterling, PTA [address]    Treatment Charges: Mins Units   Initial Evaluation     Re-Evaluation     Ther Exercise         TE 30 2   Manual Therapy     MT     Ther Activities        TA 8 1   Gait Training          GT     Neuro Re-education NR     Modalities     Non-Billable Service Time     Other     Total Time/Units 38 3

## 2022-12-30 ENCOUNTER — COMMUNITY OUTREACH (OUTPATIENT)
Dept: PRIMARY CARE CLINIC | Age: 62
End: 2022-12-30

## 2022-12-30 NOTE — PROGRESS NOTES
Patient's  shows they are overdue for Mammogram Screening. Care Everywhere and  files searched. Mamm found 2008, hm updated.

## 2023-01-04 ENCOUNTER — TREATMENT (OUTPATIENT)
Dept: PHYSICAL THERAPY | Age: 63
End: 2023-01-04
Payer: COMMERCIAL

## 2023-01-04 DIAGNOSIS — G57.81 DISORDER OF RIGHT SURAL NERVE: ICD-10-CM

## 2023-01-04 DIAGNOSIS — Z09 POSTOPERATIVE EXAMINATION: Primary | ICD-10-CM

## 2023-01-04 PROCEDURE — 97110 THERAPEUTIC EXERCISES: CPT | Performed by: PHYSICAL THERAPIST

## 2023-01-04 PROCEDURE — 97164 PT RE-EVAL EST PLAN CARE: CPT | Performed by: PHYSICAL THERAPIST

## 2023-01-04 NOTE — PROGRESS NOTES
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 Reports pain not bad today just one 'pinch' on lateral ankle. 3-4/10. O:   Time 710- 0750      Visit  Repeat outcome measure at mid point and end. Pain See above. ROM Ankle:  Right:   AROM: 10° Dorsiflexion,  45° Plantarflexion, 13° Inversion, 7° Eversion      Left:   AROM: 20° Dorsiflexion,  50° Plantarflexion, 25° Inversion, 15° Eversion        Modalities            Manual            Stretch      Gastroc stretch     Soleous stretch      Exercise      Bike 10  min     Ankle 4 way motion      Ankle T band GTB     Ankle bosu motion df, pf, ev, iv and circles CW, CCW     Ankle alphabet     SLS     Sit to stand  no blue foam     TG squats     TG calf raises     Hip add and ext      Step-ups - FWD 6 inch     Step-ups - LAT 6 inch     Step-ups - BWD        NMR To improve balance for safe community and home ambulation    Resisted walk      FWD      BKWD      lat      March      SLS     Side stepping      Retro walk      Heel to toe      A:  Tolerated well. Reassessment completed today. See note for details. P: Continue with rehab plan 2x week x 3 more weeks.    [place], [place] [address]    Treatment Charges: Mins Units   Initial Evaluation     Re-Evaluation 30 1   Ther Exercise         TE 10 1   Manual Therapy     MT     Ther Activities TA     Gait Training          GT     Neuro Re-education NR     Modalities     Non-Billable Service Time     Other     Total Time/Units 40 2

## 2023-01-04 NOTE — PROGRESS NOTES
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 ATTENDANCE:  Patient has attended 15 of 12 scheduled treatments from 11/16/2022  to 1/4/2023. TREATMENTS RECEIVED:    therapeutic exercise, therapeutic activity     INITIAL STATUS:    Observations: well nourished female        Edema: figure 8      R: 53 cm   L:53 cm      Gait:  using knee scooter . Did not assess gait due to pt didn't  have boot with her today. Joint/Motion:     Ankle:  Right:   AROM: 5° Dorsiflexion,  45° Plantarflexion, 8° Inversion, 5° Eversion      Left:   AROM: 20° Dorsiflexion,  50° Plantarflexion, 20° Inversion, 10° Eversion        Strength: Ankle:  Right: grossly 3/5 - did not formally test.   Left: Dorsiflexion 5/5, Plantarflexion 5/5, Inversion 5/5, Eversion 5/5     Palpation: Tender to palpation around lateral malleolus over /around incisions      Special Tests/Functional Screens: NT d/t postop   [] Anterior Drawer []+ / [] -  [] Eversion Stress: []+ / [] -  [] Butler Test []+ / [] -             [] Tib-Fib Compression Test []+ / [] -     [] Inversion Stress []+ / [] -     [] Squeeze Test []+ / [] -   [] Marie's Sign []+ / [] -   [] Other: []+ / []         CURRENT STATUS:       Observations: well nourished female        Edema: figure 8      R: 51 cm   L:51 cm      Gait:  ambulates without assistive device. Pt ambulating without boot. Joint/Motion:     Ankle:  Right:   AROM: 10° Dorsiflexion,  45° Plantarflexion, 13° Inversion, 7° Eversion      Left:   AROM: 20° Dorsiflexion,  50° Plantarflexion, 25° Inversion, 15° Eversion        Strength:      Ankle:  Right:  Dorsiflexion 4+/5, Plantarflexion 4+/5, Inversion 4/5, Eversion 4/5  Left: Dorsiflexion 5/5, Plantarflexion 5/5, Inversion 5/5, Eversion 5/5     Palpation: Tender to palpation around lateral malleolus over /around incisions and proximal dorsal foot near ankle     Special Tests/Functional Screens: NT d/t postop   [] Anterior Drawer []+ / [] -  [] Eversion Stress: []+ / [] -  [] Butler Test []+ / [] -             [] Tib-Fib Compression Test []+ / [] -     [] Inversion Stress []+ / [] -     [] Squeeze Test []+ / [] -   [] Marie's Sign []+ / [] -   [] Other: []+ / []            Short Term goals (3 weeks)  Decrease reported pain to 0-6/10 (goal met)   Increase ROM to AROM: 10° Dorsiflexion,  50° Plantarflexion, 10° Inversion, 8° Eversion (partial goal met)   Increase Strength to 4/5 (goal met)   Able to perform/complete the following functions/tasks: Pt able to walk 20 minutes with min pain/limitation. Pt able to go up/down 5 steps with min pain/limitation. Pt able to stand 15 + minutes with min pain/limitation.  (goal met)   Lower Extremity Functional Scale (LEFS) 35/80 impairment (goal met)      Long Term goals (6 weeks)  Decrease reported pain to 0-3/10 (not met)   Increase ROM to AROM: 20° Dorsiflexion,  50° Plantarflexion, 20° Inversion, 10 ° Eversion (not met)   Increase strength to 4+ to 5/5 (partial goal met)   Able to complete the following functions/tasks: pt able to walk 40 + minutes with no to min pain/limitation. Pt able to go up/down flight of steps with no pain/limitation. Pt able to stand 20+ minutes with no pain/limitation. (not met)   LEFS  50/80 impairment (not met)   Independent with home exercise program (HEP)    OUTCOME MEASURE:  LEFS 43/80      COMMENTS AND RECOMMENDATIONS:   Pt has met most short term goals but not long term goals. Pt reports able to walk a store about 30-45 minutes with min pain. Reports able to stand to cook about 30 minutes with min pain.   Pt able to go up/down flight of steps in session with some pain reported. Pt goes up steps with 1 rail reciprocally. Pt began descending with 1 rail but going down sideways. Pt reports that is how she does it at home due to only having 1 rail. With cues pt able to descend reciprocally with 2 rails going forwards. Pt does report some discomfort though. Pt reports has to be able to stand and walk majority of day at work. After discussion with pt, recommend pt be seen for PT for additional 3 weeks, 2 times a week to continue to improve walking and standing tolerance for return to work. Pt in agreement. Thank you for the opportunity to work with your patient. Tammy Barajas, PT DPT [id number]    I CERTIFY THAT THE ABOVE REASSESSMENT AND PLAN OF CARE FOR PHYSICAL THERAPY SERVICES ARE APPROPRIATE AND MEDICALLY NECESSARY.     Duration: From 1/4/2023 thru 1/27/2023    ________________________                _______________  Physician     Date

## 2023-01-06 ENCOUNTER — OFFICE VISIT (OUTPATIENT)
Dept: PODIATRY | Age: 63
End: 2023-01-06

## 2023-01-06 VITALS — WEIGHT: 190 LBS | TEMPERATURE: 98.2 F | BODY MASS INDEX: 32.61 KG/M2

## 2023-01-06 DIAGNOSIS — Z09 POSTOPERATIVE EXAMINATION: Primary | ICD-10-CM

## 2023-01-06 PROCEDURE — 99024 POSTOP FOLLOW-UP VISIT: CPT | Performed by: PODIATRIST

## 2023-01-06 NOTE — PROGRESS NOTES
Postop Progress Note    Subjective    Heidi García presents to the office 2 months  following ankle. Pain is controlled with current analgesics. In therapy doing better    Objective    Vitals:    01/06/23 1600   Temp: 98.2 °F (36.8 °C)     General: alert, cooperative, and no distress  Incision: healing well    Assessment    Doing well postoperatively. Plan   1. Continue any current medications  2. Wound care discussed  3. Wound/Incision: healing well  4. Disposition:   Pt is to increase activities as tolerated. Should remain out of work until 2-  .  5. Diet: regular diet  6. Follow up: 6  weeks.      In therapy       Electronically signed by Miguelangel Ng DPM on 1/6/2023 at 4:19 PM

## 2023-01-06 NOTE — LETTER
[address] [place] [address]  [facility]go [place]  Phone: [phone]  Fax: [place], [place]        January 6, 2023     Patient: Margy Whitney   YOB: 1960   Date of Visit: 1/6/2023       To Whom it May Concern:    Melinda Suarez was seen in my clinic on 1/6/2023. She can return to work 2/23/2023 per Dr. Kaushik Wilkins. If you have any questions or concerns, please don't hesitate to call.     Sincerely,         Fabiola Mead DPM

## 2023-01-09 ENCOUNTER — TREATMENT (OUTPATIENT)
Dept: PHYSICAL THERAPY | Age: 63
End: 2023-01-09
Payer: COMMERCIAL

## 2023-01-09 DIAGNOSIS — G89.18 PAIN FOLLOWING SURGERY OR PROCEDURE: ICD-10-CM

## 2023-01-09 DIAGNOSIS — S93.401D RUPTURE OF LIGAMENT OF ANKLE, RIGHT, SUBSEQUENT ENCOUNTER: ICD-10-CM

## 2023-01-09 DIAGNOSIS — S86.311D PERONEAL TENDON RUPTURE, RIGHT, SUBSEQUENT ENCOUNTER: Primary | ICD-10-CM

## 2023-01-09 DIAGNOSIS — G57.81 DISORDER OF RIGHT SURAL NERVE: ICD-10-CM

## 2023-01-09 PROCEDURE — 97112 NEUROMUSCULAR REEDUCATION: CPT

## 2023-01-09 PROCEDURE — 97110 THERAPEUTIC EXERCISES: CPT

## 2023-01-09 NOTE — PROGRESS NOTES
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 Pt purchases new supportive shoes and reports that she has noticed a significant improvement in pain. No pain reported today. O:   Time 840- 918     Visit   1 Repeat outcome measure at mid point and end. Pain See above. ROM Ankle:  Right:   AROM: 10° Dorsiflexion,  45° Plantarflexion, 13° Inversion, 7° Eversion      Left:   AROM: 20° Dorsiflexion,  50° Plantarflexion, 25° Inversion, 15° Eversion        Modalities            Manual            Stretch      Gastroc stretch 3 x 30s     Soleous stretch  3 x 30s     Exercise      Bike 10  min     Ankle 4 way motion      Ankle T band 2 x 10  BTB     Ankle bosu motion df, pf, ev, iv and circles CW, CCW X 20 each      Ankle alphabet     SLS     Sit to stand  2 x 10 no blue foam     TG squats      TG calf raises 2 x 10     Hip add and ext  2 x 10     Step-ups - FWD 6 inch     Step-ups - LAT 6 inch     Step-ups - BWD        NMR To improve balance for safe community and home ambulation    Resisted walk      FWD      BKWD      lat X 10  40#=10# cable system    March      SLS 3 x 30s  On blue foam     Side stepping      Retro walk      Heel to toe      A:  Tolerated well. Attempted step ups onto 8 inch step today, however pt reports pain and \"cracking\" on L knee with first step up. Pt walked around after with no pain noted. Recommend that pt call physician if she has any increased pain. No pain in R foot following treatment. P: Continue with rehab plan.   Sejal Yair, PTA [address]    Treatment Charges: Mins Units   Initial Evaluation     Re-Evaluation     Ther Exercise         TE 28 2   Manual Therapy     MT     Ther Activities        TA     Gait Training          GT     Neuro Re-education NR 10 1   Modalities     Non-Billable Service Time     Other     Total Time/Units 38 3

## 2023-01-10 ENCOUNTER — APPOINTMENT (OUTPATIENT)
Dept: GENERAL RADIOLOGY | Age: 63
DRG: 482 | End: 2023-01-10
Payer: COMMERCIAL

## 2023-01-10 ENCOUNTER — HOSPITAL ENCOUNTER (INPATIENT)
Age: 63
LOS: 4 days | Discharge: HOME HEALTH | DRG: 482 | End: 2023-01-15
Attending: EMERGENCY MEDICINE | Admitting: INTERNAL MEDICINE
Payer: COMMERCIAL

## 2023-01-10 DIAGNOSIS — S72.491A OTHER CLOSED FRACTURE OF DISTAL END OF RIGHT FEMUR, INITIAL ENCOUNTER (HCC): Primary | ICD-10-CM

## 2023-01-10 PROCEDURE — 99285 EMERGENCY DEPT VISIT HI MDM: CPT

## 2023-01-10 PROCEDURE — 73560 X-RAY EXAM OF KNEE 1 OR 2: CPT

## 2023-01-10 ASSESSMENT — PAIN DESCRIPTION - LOCATION: LOCATION: LEG

## 2023-01-10 ASSESSMENT — PAIN DESCRIPTION - PAIN TYPE: TYPE: ACUTE PAIN

## 2023-01-10 ASSESSMENT — PAIN DESCRIPTION - ORIENTATION: ORIENTATION: RIGHT

## 2023-01-10 ASSESSMENT — PAIN DESCRIPTION - DESCRIPTORS: DESCRIPTORS: TENDER;THROBBING

## 2023-01-10 ASSESSMENT — PAIN SCALES - GENERAL: PAINLEVEL_OUTOF10: 7

## 2023-01-10 ASSESSMENT — PAIN - FUNCTIONAL ASSESSMENT: PAIN_FUNCTIONAL_ASSESSMENT: 0-10

## 2023-01-11 ENCOUNTER — APPOINTMENT (OUTPATIENT)
Dept: GENERAL RADIOLOGY | Age: 63
DRG: 482 | End: 2023-01-11
Payer: COMMERCIAL

## 2023-01-11 PROBLEM — S72.8X1A OTHER FRACTURE OF RIGHT FEMUR, INITIAL ENCOUNTER FOR CLOSED FRACTURE (HCC): Status: ACTIVE | Noted: 2023-01-11

## 2023-01-11 PROBLEM — S72.011P: Status: ACTIVE | Noted: 2023-01-11

## 2023-01-11 LAB
ALBUMIN SERPL-MCNC: 4.2 G/DL (ref 3.5–5.2)
ALP BLD-CCNC: 77 U/L (ref 35–104)
ALT SERPL-CCNC: 20 U/L (ref 0–32)
ANION GAP SERPL CALCULATED.3IONS-SCNC: 11 MMOL/L (ref 7–16)
ANION GAP SERPL CALCULATED.3IONS-SCNC: 11 MMOL/L (ref 7–16)
AST SERPL-CCNC: 23 U/L (ref 0–31)
BASOPHILS ABSOLUTE: 0.08 E9/L (ref 0–0.2)
BASOPHILS ABSOLUTE: 0.08 E9/L (ref 0–0.2)
BASOPHILS RELATIVE PERCENT: 0.9 % (ref 0–2)
BASOPHILS RELATIVE PERCENT: 0.9 % (ref 0–2)
BILIRUB SERPL-MCNC: 0.3 MG/DL (ref 0–1.2)
BUN BLDV-MCNC: 16 MG/DL (ref 6–23)
BUN BLDV-MCNC: 18 MG/DL (ref 6–23)
CALCIUM SERPL-MCNC: 9.4 MG/DL (ref 8.6–10.2)
CALCIUM SERPL-MCNC: 9.8 MG/DL (ref 8.6–10.2)
CHLORIDE BLD-SCNC: 98 MMOL/L (ref 98–107)
CHLORIDE BLD-SCNC: 99 MMOL/L (ref 98–107)
CO2: 26 MMOL/L (ref 22–29)
CO2: 26 MMOL/L (ref 22–29)
CREAT SERPL-MCNC: 0.8 MG/DL (ref 0.5–1)
CREAT SERPL-MCNC: 0.8 MG/DL (ref 0.5–1)
EKG ATRIAL RATE: 75 BPM
EKG P AXIS: 47 DEGREES
EKG P-R INTERVAL: 162 MS
EKG Q-T INTERVAL: 402 MS
EKG QRS DURATION: 96 MS
EKG QTC CALCULATION (BAZETT): 448 MS
EKG R AXIS: -30 DEGREES
EKG T AXIS: 33 DEGREES
EKG VENTRICULAR RATE: 75 BPM
EOSINOPHILS ABSOLUTE: 0.09 E9/L (ref 0.05–0.5)
EOSINOPHILS ABSOLUTE: 0.12 E9/L (ref 0.05–0.5)
EOSINOPHILS RELATIVE PERCENT: 1.1 % (ref 0–6)
EOSINOPHILS RELATIVE PERCENT: 1.3 % (ref 0–6)
GFR SERPL CREATININE-BSD FRML MDRD: >60 ML/MIN/1.73
GFR SERPL CREATININE-BSD FRML MDRD: >60 ML/MIN/1.73
GLUCOSE BLD-MCNC: 114 MG/DL (ref 74–99)
GLUCOSE BLD-MCNC: 123 MG/DL (ref 74–99)
HCT VFR BLD CALC: 38.3 % (ref 34–48)
HCT VFR BLD CALC: 38.9 % (ref 34–48)
HEMOGLOBIN: 12.6 G/DL (ref 11.5–15.5)
HEMOGLOBIN: 12.9 G/DL (ref 11.5–15.5)
IMMATURE GRANULOCYTES #: 0.03 E9/L
IMMATURE GRANULOCYTES #: 0.04 E9/L
IMMATURE GRANULOCYTES %: 0.3 % (ref 0–5)
IMMATURE GRANULOCYTES %: 0.5 % (ref 0–5)
INR BLD: 1
LYMPHOCYTES ABSOLUTE: 1.17 E9/L (ref 1.5–4)
LYMPHOCYTES ABSOLUTE: 1.44 E9/L (ref 1.5–4)
LYMPHOCYTES RELATIVE PERCENT: 13.8 % (ref 20–42)
LYMPHOCYTES RELATIVE PERCENT: 16.2 % (ref 20–42)
MCH RBC QN AUTO: 29 PG (ref 26–35)
MCH RBC QN AUTO: 29.2 PG (ref 26–35)
MCHC RBC AUTO-ENTMCNC: 32.9 % (ref 32–34.5)
MCHC RBC AUTO-ENTMCNC: 33.2 % (ref 32–34.5)
MCV RBC AUTO: 88 FL (ref 80–99.9)
MCV RBC AUTO: 88 FL (ref 80–99.9)
MONOCYTES ABSOLUTE: 0.69 E9/L (ref 0.1–0.95)
MONOCYTES ABSOLUTE: 0.7 E9/L (ref 0.1–0.95)
MONOCYTES RELATIVE PERCENT: 7.9 % (ref 2–12)
MONOCYTES RELATIVE PERCENT: 8.1 % (ref 2–12)
NEUTROPHILS ABSOLUTE: 6.4 E9/L (ref 1.8–7.3)
NEUTROPHILS ABSOLUTE: 6.53 E9/L (ref 1.8–7.3)
NEUTROPHILS RELATIVE PERCENT: 73.4 % (ref 43–80)
NEUTROPHILS RELATIVE PERCENT: 75.6 % (ref 43–80)
PDW BLD-RTO: 12.8 FL (ref 11.5–15)
PDW BLD-RTO: 13.1 FL (ref 11.5–15)
PLATELET # BLD: 189 E9/L (ref 130–450)
PLATELET # BLD: 198 E9/L (ref 130–450)
PMV BLD AUTO: 10.6 FL (ref 7–12)
PMV BLD AUTO: 10.7 FL (ref 7–12)
POTASSIUM REFLEX MAGNESIUM: 3.9 MMOL/L (ref 3.5–5)
POTASSIUM SERPL-SCNC: 4.4 MMOL/L (ref 3.5–5)
PROTHROMBIN TIME: 10.5 SEC (ref 9.3–12.4)
RBC # BLD: 4.35 E12/L (ref 3.5–5.5)
RBC # BLD: 4.42 E12/L (ref 3.5–5.5)
REASON FOR REJECTION: NORMAL
REJECTED TEST: NORMAL
SODIUM BLD-SCNC: 135 MMOL/L (ref 132–146)
SODIUM BLD-SCNC: 136 MMOL/L (ref 132–146)
TOTAL PROTEIN: 6.7 G/DL (ref 6.4–8.3)
WBC # BLD: 8.5 E9/L (ref 4.5–11.5)
WBC # BLD: 8.9 E9/L (ref 4.5–11.5)

## 2023-01-11 PROCEDURE — 6360000002 HC RX W HCPCS

## 2023-01-11 PROCEDURE — 2580000003 HC RX 258: Performed by: NURSE PRACTITIONER

## 2023-01-11 PROCEDURE — 93010 ELECTROCARDIOGRAM REPORT: CPT | Performed by: INTERNAL MEDICINE

## 2023-01-11 PROCEDURE — 80053 COMPREHEN METABOLIC PANEL: CPT

## 2023-01-11 PROCEDURE — 71045 X-RAY EXAM CHEST 1 VIEW: CPT

## 2023-01-11 PROCEDURE — 80048 BASIC METABOLIC PNL TOTAL CA: CPT

## 2023-01-11 PROCEDURE — 36415 COLL VENOUS BLD VENIPUNCTURE: CPT

## 2023-01-11 PROCEDURE — 6360000002 HC RX W HCPCS: Performed by: ORTHOPAEDIC SURGERY

## 2023-01-11 PROCEDURE — 85025 COMPLETE CBC W/AUTO DIFF WBC: CPT

## 2023-01-11 PROCEDURE — 1200000000 HC SEMI PRIVATE

## 2023-01-11 PROCEDURE — 73552 X-RAY EXAM OF FEMUR 2/>: CPT

## 2023-01-11 PROCEDURE — 85610 PROTHROMBIN TIME: CPT

## 2023-01-11 PROCEDURE — 93005 ELECTROCARDIOGRAM TRACING: CPT | Performed by: EMERGENCY MEDICINE

## 2023-01-11 RX ORDER — SENNA PLUS 8.6 MG/1
1 TABLET ORAL DAILY PRN
Status: DISCONTINUED | OUTPATIENT
Start: 2023-01-11 | End: 2023-01-15 | Stop reason: HOSPADM

## 2023-01-11 RX ORDER — POTASSIUM CHLORIDE 20 MEQ/1
40 TABLET, EXTENDED RELEASE ORAL PRN
Status: DISCONTINUED | OUTPATIENT
Start: 2023-01-11 | End: 2023-01-15 | Stop reason: HOSPADM

## 2023-01-11 RX ORDER — ENOXAPARIN SODIUM 100 MG/ML
40 INJECTION SUBCUTANEOUS DAILY
Status: DISCONTINUED | OUTPATIENT
Start: 2023-01-12 | End: 2023-01-13

## 2023-01-11 RX ORDER — POTASSIUM CHLORIDE 7.45 MG/ML
10 INJECTION INTRAVENOUS PRN
Status: DISCONTINUED | OUTPATIENT
Start: 2023-01-11 | End: 2023-01-15 | Stop reason: HOSPADM

## 2023-01-11 RX ORDER — ONDANSETRON 2 MG/ML
4 INJECTION INTRAMUSCULAR; INTRAVENOUS EVERY 6 HOURS PRN
Status: DISCONTINUED | OUTPATIENT
Start: 2023-01-11 | End: 2023-01-15 | Stop reason: SDUPTHER

## 2023-01-11 RX ORDER — ACETAMINOPHEN 650 MG/1
650 SUPPOSITORY RECTAL EVERY 6 HOURS PRN
Status: DISCONTINUED | OUTPATIENT
Start: 2023-01-11 | End: 2023-01-15 | Stop reason: HOSPADM

## 2023-01-11 RX ORDER — SODIUM CHLORIDE 0.9 % (FLUSH) 0.9 %
10 SYRINGE (ML) INJECTION EVERY 12 HOURS SCHEDULED
Status: DISCONTINUED | OUTPATIENT
Start: 2023-01-11 | End: 2023-01-15 | Stop reason: HOSPADM

## 2023-01-11 RX ORDER — ONDANSETRON 4 MG/1
4 TABLET, ORALLY DISINTEGRATING ORAL EVERY 8 HOURS PRN
Status: DISCONTINUED | OUTPATIENT
Start: 2023-01-11 | End: 2023-01-15 | Stop reason: SDUPTHER

## 2023-01-11 RX ORDER — ACETAMINOPHEN 325 MG/1
650 TABLET ORAL EVERY 6 HOURS PRN
Status: DISCONTINUED | OUTPATIENT
Start: 2023-01-11 | End: 2023-01-15 | Stop reason: HOSPADM

## 2023-01-11 RX ORDER — SODIUM CHLORIDE 9 MG/ML
INJECTION, SOLUTION INTRAVENOUS PRN
Status: DISCONTINUED | OUTPATIENT
Start: 2023-01-11 | End: 2023-01-15 | Stop reason: HOSPADM

## 2023-01-11 RX ORDER — OXYCODONE HYDROCHLORIDE 5 MG/1
5 TABLET ORAL EVERY 4 HOURS PRN
Status: DISCONTINUED | OUTPATIENT
Start: 2023-01-11 | End: 2023-01-13 | Stop reason: SDUPTHER

## 2023-01-11 RX ORDER — SODIUM CHLORIDE 0.9 % (FLUSH) 0.9 %
10 SYRINGE (ML) INJECTION PRN
Status: DISCONTINUED | OUTPATIENT
Start: 2023-01-11 | End: 2023-01-15 | Stop reason: HOSPADM

## 2023-01-11 RX ADMIN — HYDROMORPHONE HYDROCHLORIDE 0.5 MG: 1 INJECTION, SOLUTION INTRAMUSCULAR; INTRAVENOUS; SUBCUTANEOUS at 19:38

## 2023-01-11 RX ADMIN — HYDROMORPHONE HYDROCHLORIDE 0.5 MG: 1 INJECTION, SOLUTION INTRAMUSCULAR; INTRAVENOUS; SUBCUTANEOUS at 11:31

## 2023-01-11 RX ADMIN — HYDROMORPHONE HYDROCHLORIDE 0.5 MG: 1 INJECTION, SOLUTION INTRAMUSCULAR; INTRAVENOUS; SUBCUTANEOUS at 23:14

## 2023-01-11 RX ADMIN — HYDROMORPHONE HYDROCHLORIDE 0.5 MG: 1 INJECTION, SOLUTION INTRAMUSCULAR; INTRAVENOUS; SUBCUTANEOUS at 02:11

## 2023-01-11 RX ADMIN — Medication 10 ML: at 11:27

## 2023-01-11 RX ADMIN — HYDROMORPHONE HYDROCHLORIDE 0.5 MG: 1 INJECTION, SOLUTION INTRAMUSCULAR; INTRAVENOUS; SUBCUTANEOUS at 07:28

## 2023-01-11 RX ADMIN — HYDROMORPHONE HYDROCHLORIDE 0.5 MG: 1 INJECTION, SOLUTION INTRAMUSCULAR; INTRAVENOUS; SUBCUTANEOUS at 15:54

## 2023-01-11 RX ADMIN — Medication 10 ML: at 19:38

## 2023-01-11 RX ADMIN — SODIUM CHLORIDE, PRESERVATIVE FREE 10 ML: 5 INJECTION INTRAVENOUS at 07:28

## 2023-01-11 ASSESSMENT — ENCOUNTER SYMPTOMS
DIARRHEA: 0
SORE THROAT: 0
COLOR CHANGE: 0
SHORTNESS OF BREATH: 0
COUGH: 0
VOMITING: 0
CONSTIPATION: 0
ABDOMINAL PAIN: 0
NAUSEA: 0
EYE DISCHARGE: 0
SINUS PAIN: 0

## 2023-01-11 ASSESSMENT — PAIN DESCRIPTION - LOCATION
LOCATION: LEG
LOCATION: HIP
LOCATION: LEG
LOCATION: LEG
LOCATION: HIP
LOCATION: LEG

## 2023-01-11 ASSESSMENT — PAIN DESCRIPTION - ORIENTATION
ORIENTATION: RIGHT

## 2023-01-11 ASSESSMENT — PAIN DESCRIPTION - PAIN TYPE
TYPE: ACUTE PAIN

## 2023-01-11 ASSESSMENT — PAIN SCALES - GENERAL
PAINLEVEL_OUTOF10: 7
PAINLEVEL_OUTOF10: 6
PAINLEVEL_OUTOF10: 6
PAINLEVEL_OUTOF10: 7
PAINLEVEL_OUTOF10: 3
PAINLEVEL_OUTOF10: 7
PAINLEVEL_OUTOF10: 6
PAINLEVEL_OUTOF10: 6
PAINLEVEL_OUTOF10: 8
PAINLEVEL_OUTOF10: 9
PAINLEVEL_OUTOF10: 3

## 2023-01-11 ASSESSMENT — PAIN DESCRIPTION - ONSET
ONSET: ON-GOING
ONSET: ON-GOING

## 2023-01-11 ASSESSMENT — PAIN DESCRIPTION - FREQUENCY
FREQUENCY: CONTINUOUS
FREQUENCY: CONTINUOUS

## 2023-01-11 ASSESSMENT — PAIN DESCRIPTION - DESCRIPTORS
DESCRIPTORS: DISCOMFORT
DESCRIPTORS: DISCOMFORT
DESCRIPTORS: ACHING
DESCRIPTORS: DISCOMFORT
DESCRIPTORS: ACHING
DESCRIPTORS: ACHING;DISCOMFORT;SORE;TENDER
DESCRIPTORS: ACHING
DESCRIPTORS: STABBING;THROBBING
DESCRIPTORS: BURNING;STABBING

## 2023-01-11 ASSESSMENT — PAIN - FUNCTIONAL ASSESSMENT
PAIN_FUNCTIONAL_ASSESSMENT: 0-10
PAIN_FUNCTIONAL_ASSESSMENT: ACTIVITIES ARE NOT PREVENTED
PAIN_FUNCTIONAL_ASSESSMENT: PREVENTS OR INTERFERES WITH ALL ACTIVE AND SOME PASSIVE ACTIVITIES
PAIN_FUNCTIONAL_ASSESSMENT: ACTIVITIES ARE NOT PREVENTED

## 2023-01-11 NOTE — CONSULTS
Orthopaedic Consultation  Nic Reza DO      CHIEF COMPLAINT: Right leg pain    History of Present Illness: This patient is a pleasant 61-year-old female. She recently had surgery on her right foot back in October by Dr. Karena Lewis. She had been doing well in her recovery, but states that she has been handling going up and down stairs at home by sidestepping. She slipped and fell down 2 stairs yesterday, landing with her right leg hyper extended behind her. This caused significant pain, as she felt a snap in the leg. She was unable to bear weight secondary to the pain. Patient states she does not typically ambulate with a wheeled walker or cane. She has no underlying or significant health issues. Does not take any anticoagulant medication regularly. She denies head injury or loss of consciousness. At this time, besides right leg pain, she has no other complaints. Past Medical History:   Diagnosis Date    Asthma     well controlled per patient    Right foot pain     For OR 10/14/22         Past Surgical History:   Procedure Laterality Date    ANKLE SURGERY Right 4/30/2021    REPAIR ANTERIOR TALAR FIBULAR LIGAMENT RIGHT FOOT REPAIR PERONEAL TENDON RIGHT FOOT, SURAL NERVE LYSIS  W PRP performed by Naveed Nicole DPM at [address] Right     Tendon repair    HYSTERECTOMY (CERVIX STATUS UNKNOWN)      KNEE SURGERY Bilateral     LEG SURGERY Right 10/14/2022    SURAL NERVE RESECTION RIGHT FOOT performed by Naveed Nicole DPM at [address] [place] [address]        Medications Prior to Admission:    Medications Prior to Admission: L-methylfolate-B6-B12 [facility]) 3-90.314-2-35 MG CAPS capsule, Take 1 capsule by mouth daily  Omega-3 Fatty Acids (FISH OIL) 1000 MG capsule, Take by mouth daily    Allergies:    Latex, Aspirin, Bee venom, Codeine, Penicillins, Soap, and Sulfa antibiotics    Social History:    reports that she quit smoking about 26 years ago.  Her smoking use included cigarettes. She has never used smokeless tobacco. She reports that she does not currently use alcohol. She reports that she does not use drugs. Family History:   family history is not on file. REVIEW OF SYSTEMS:  As above in the HPI, otherwise negative    PHYSICAL EXAM:    Vitals:  /68   Pulse 73   Temp 98.6 °F (37 °C) (Oral)   Resp 16   Ht 5' 4\" (1.626 m)   Wt 187 lb (84.8 kg)   SpO2 100%   BMI 32.10 kg/m²     General:  Awake, alert, oriented X 3. Well developed, well nourished, well groomed. No apparent distress. HEENT:  Normocephalic, atraumatic. Pupils equal, round, reactive to light. No scleral icterus. No conjunctival injection. Normal lips, teeth, and gums. No nasal discharge. Neck:  Supple  Heart:  RRR, no murmurs, gallops, or rubs  Lungs:  CTA bilaterally, bilat symmetrical expansion, no wheeze, rales, or rhonchi  Abdomen: Bowel sounds present, soft, nontender, no masses, no organomegaly, no peritoneal signs  Extremities: Splint in place about the right lower extremity. Patient is carefully able to wiggle all toes of difficulty. Range of motion not evaluated due to known underlying fracture. All compartments seem to be soft. Sensation grossly intact light touch, L3-S1.   Skin:  Warm and dry, no open lesions or rash  Neuro:  Cranial nerves 2-12 intact, no focal deficits  Breast: deferred  Rectal: deferred  Genitalia:  deferred    LABS:  CBC:   Lab Results   Component Value Date/Time    WBC 8.5 01/11/2023 05:10 AM    RBC 4.35 01/11/2023 05:10 AM    HGB 12.6 01/11/2023 05:10 AM    HCT 38.3 01/11/2023 05:10 AM    MCV 88.0 01/11/2023 05:10 AM    MCH 29.0 01/11/2023 05:10 AM    MCHC 32.9 01/11/2023 05:10 AM    RDW 13.1 01/11/2023 05:10 AM     01/11/2023 05:10 AM    MPV 10.7 01/11/2023 05:10 AM         ASSESSMENT:      Patient Active Problem List   Diagnosis    Hypothyroidism    Abnormal EKG    Floating ossicle of ankle    Disorder of right sural nerve    Spontaneous rupture of flexor tendon of right foot    Other fracture of right femur, initial encounter for closed fracture ([address]    Fracture of femur, subcapital, closed, right, with malunion, subsequent encounter       PLAN:    X-rays of the right knee and distal femur reviewed. Patient has an oblique/spiral type fracture of the distal femur which seems to be extra-articular. Fracture pattern seems to be amenable to retrograde nailing. I discussed the need for surgery at length with the patient. All risk, benefits alternatives to treatment include but not limited to loss of life, loss of limb, bleeding, infection, damage surrounding structures and potential need for further surgery reviewed. Informed consent was obtained. Tentatively, patient is scheduled for ORIF this Friday, 1/13/2023. We will keep her n.p.o. after midnight tomorrow. We discussed that she will likely be able to at least bear partial weight following the surgery. Of note greater than 50 minutes were spent on the floor and with patient performing H&P, reviewing labs and imaging, and discussing plan. Half the time was spent counseling and coordinating care.     rCistiana Hummel, [facility]

## 2023-01-11 NOTE — CARE COORDINATION
Internal Medicine On-call Care Coordination Note    I was called by the ED physician because they recommended admission for this patient and we cover their PCP. The history as I understand it after discussion with the ED physician is as follows:    Presents after suffering a mechanical fall at home  XR R knee showing transverse fracture distal femoral metadiaphysis  ED spoke with Dr. Miles Trejo    I placed admission orders. Including:    NPO  Bedrest    Either Dr. Karrie Michael, Dr. Milad Trujillo, or our coverage will see the patient tomorrow for H&P.     Electronically signed by ERAN Cavazos CNP on 1/11/2023 at 12:45 AM

## 2023-01-11 NOTE — ED NOTES
Long leg splint applied after pre-medicated with Dilaudid IV.       Jagjit Kessler RN  01/11/23 [id number]

## 2023-01-11 NOTE — PROGRESS NOTES
Dr Miles Trejo called to report that Dr. Mesfin Rizzo will be performing surgery on Friday, 1/13/23 at 0730. Also, received new orders for pain management. Pt and  updated. Pt given a diet at this time.

## 2023-01-11 NOTE — CARE COORDINATION
Social Work:    Reviewed chart notes. Pat Acosta fell at home and is scheduled for a retrograde intramedullary nailing of her right femur this Friday. She recently underwent right ankle repair this past October. Social service met with Pat Acosta and her , advised them about social service &  roles, as well as discussed discharge planning. Pat Acosta is a patient of Dr. Domingo Brunner and uses [facility] pharmacy. She resides in a bilevel home and has a wheelchair, wheeled walker, scooter, crutches, and was actively going to O.P. rehab prior to her new injury. Pat Acosta feels in-patient rehab will be required and requested [facility] snf, however, they do not contract with [employer]. Social work provided a snf list to Pat Acosta and requested three choices, as well as advised her about therapy evaluations and criteria that will be required to qualify for skilled rehab vs home care. Social work to follow.     Electronically signed by CARLOZ Ceja on 1/11/2023 at 3:06 PM

## 2023-01-11 NOTE — PROGRESS NOTES
Patient for surgery 1/13/23  Retrograde nail ORIF of right knee  NPO after midnight on 1/13  Formal consult note to follow

## 2023-01-11 NOTE — PROGRESS NOTES
[address] [facility]   Phone: [phone]   Fax: [phone]        Referring Provider: Laly Nieto DPM  [address] [facility] [address] [address], [address] [facility] Diagnosis:   Y10 (ICD-10-CM) - Postoperative examination   G57.81 (ICD-10-CM) - Disorder of right sural nerve       CERTIFICATION PERIOD:  1/4/2023 thru 1/27/2023    ATTENDANCE:  Patient has attended 1 of 6 scheduled treatments from 1/9/2023  to 1/25/2023. TREATMENTS RECEIVED:  therapeutic exercise , neuromuscular reeducation       COMMENTS AND RECOMMENDATIONS:   Pt called in to cancel all remaining appointments. Pt states is in hospital.  Reports broke her femur. Will discharge pt at this time. Recommend pt call with any questions. Thank you for the opportunity to work with your patient. Olu Aguillon, PT DPT [id number]    I CERTIFY THAT THE ABOVE REASSESSMENT AND PLAN OF CARE FOR PHYSICAL THERAPY SERVICES ARE APPROPRIATE AND MEDICALLY NECESSARY.         ________________________                _______________  Physician     Date

## 2023-01-11 NOTE — H&P
Internal Medicine History & Physical     Chief Complaint: Santiago Arriaza Bullion down a few steps while at home. No LOC. Right lateral distal thigh deformity noted. Patient did not hit head.)  Primary Care Physician: Case Koenig DO    History of Present Illness  Edison Sylvia is a 65y.o. year old female who  has a past medical history of Asthma and Right foot pain. .  Patient presents with chief complaint of right leg pain. Patient states that she has had multiple surgeries to her right foot. Patient states that she was walking down the steps on day of admission when she lost her balance onto her right side. Patient noted immediate pain on her right knee. She described the pain as sharp aching sensation she rated pain a 10 out of 10. Patient states the pain is worse with movement she was. She is concerned that she may be with her leg. Patient denies any dizziness, chest pain or shortness of breath prior to fall. She states that she has been in her usual state of health. Due to inability to ambulate as well as severe right leg pain patient came to emergency department for further evaluation. On arrival vital signs stable. Labs obtained CMP demonstrated urinalysis, CBC demonstrated no acute abnormalities. Chest x-ray obtained demonstrated no acute abnormalities, right femur and right knee x-rays obtained demonstrated mildly displaced metaphyseal diaphyseal fracture of the right femur. Orthopedic surgery was consulted and evaluated patient. Due to patient's age medicine admission was requested. On examination this morning patient is resting comfortably in bed. She states her pain is currently well controlled. Discussed that operative intervention will occur on Friday. Social work was in the room patient and  were discussing possible rehab after surgery.     Past Medical History:   Diagnosis Date    Asthma     well controlled per patient    Right foot pain     For OR 10/14/22       Past Surgical History: Procedure Laterality Date    ANKLE SURGERY Right 4/30/2021    REPAIR ANTERIOR TALAR FIBULAR LIGAMENT RIGHT FOOT REPAIR PERONEAL TENDON RIGHT FOOT, SURAL NERVE LYSIS  W PRP performed by Marquis Roberto DPM at [address] Right     Tendon repair    HYSTERECTOMY (CERVIX STATUS UNKNOWN)      KNEE SURGERY Bilateral     LEG SURGERY Right 10/14/2022    SURAL NERVE RESECTION RIGHT FOOT performed by Marquis Roberto DPM at [address] [facility]  No pertinent family medical history     Social History  Patient lives at home with . Employment: [employer]   Illicit drug use- none  TOBACCO:   reports that she quit smoking about 26 years ago. Her smoking use included cigarettes. She has never used smokeless tobacco.  ETOH:   reports that she does not currently use alcohol. Home Medications  Prior to Admission medications    Medication Sig Start Date End Date Taking? Authorizing Provider   L-methylfolate-B6-B12 [facility]) 6-46.020-5-87 MG CAPS capsule Take 1 capsule by mouth daily 11/28/22   Marquis Roberto DPM   Omega-3 Fatty Acids (FISH OIL) 1000 MG capsule Take by mouth daily    Historical Provider, MD       Allergies  Allergies   Allergen Reactions    Latex      Leaves marks on skin    Aspirin Hives and Shortness Of Breath     sick  Other reaction(s): Disease type AND/OR category unknown (finding), GI Upset    Bee Venom Anaphylaxis     Other reaction(s): Disease type AND/OR category unknown (finding)    Codeine Hives and Shortness Of Breath     fatique  Other reaction(s): Disease type AND/OR category unknown (finding)    Penicillins Hives and Shortness Of Breath     swelling  Other reaction(s): Disease type AND/OR category unknown (finding), GI Upset  headaches      Soap Hives and Shortness Of Breath    Sulfa Antibiotics      Family history        Review of Systems  Please see HPI above. All bolded are positive. All un-bolded are negative.   Constitutional Symptoms: fever, chills, fatigue, generalized weakness, diaphoresis, increase in thirst, loss of appetite  Eyes: vision change   Ears, Nose, Mouth, Throat: hearing loss, nasal congestion, sores in the mouth  Cardiovascular: chest pain, chest heaviness, palpitations  Respiratory: shortness of breath, wheezing, coughing  Gastrointestinal: abdominal pain, nausea, vomiting, diarrhea, constipation, melena, hematochezia, hematemesis  Genitourinary: dysuria, hematuria, or increase in frequency  Musculoskeletal: right lower extremity edema, myalgias, arthralgias, back pain  Integumentary: rashes, itching   Neurological: headache, lightheadedness, dizziness, confusion, syncope, numbness, tingling, focal weakness  Psychiatric: depression, suicidal ideation, or anxiety  Endocrine: unintentional weight change  Hematologic/Lymphatic: lymphadenopathy, easy bruising, easy bleeding   Allergic/Immunologic: recurrent infections      Objective  VITALS:  /68   Pulse 73   Temp 98.6 °F (37 °C) (Oral)   Resp 18   Ht 5' 4\" (1.626 m)   Wt 187 lb (84.8 kg)   SpO2 100%   BMI 32.10 kg/m²     Physical Exam:  General: awake, alert, oriented to person, place, time, and purpose, appears stated age, cooperative, no acute distress, pleasant,  mood  Eyes: conjunctivae/corneas clear, sclera non icteric, EOMI  Ears: no obvious scars, no lesions, no masses, hearing intact  Mouth: mucous membranes moist, no obvious oral sores  Head: normocephalic, atraumatic  Neck: no JVD, no adenopathy, no thyromegaly, neck is supple, trachea is midline  Back: ROM normal, no CVA tenderness. Chest: no pain on palpation  Lungs: clear to auscultation bilaterally, without rhonchi, crackle, wheezing, or rale, no retractions or use of accessory muscles  Heart: regular rate and regular rhythm, no murmur, normal S1, S2  Abdomen: soft, non-tender; bowel sounds normal; no masses, no organomegaly  Extremities:  On examination of right lower extremity there is palpable deformity noted to the distal right femur, compartments are soft compressible. Right lower extremity is neurovascular intact sensation is intact, muscle strength decreased secondary to pain. Skin: normal color, normal texture, normal turgor, no rashes, no lesions  Neurologic:5/5 muscle strength throughout, normal muscle tone throughout, face symmetric, hearing intact, tongue midline, speech appropriate without slurring, sensation to fine touch intact in upper and lower extremities    Labs-   Lab Results   Component Value Date    WBC 8.5 01/11/2023    HGB 12.6 01/11/2023    HCT 38.3 01/11/2023     01/11/2023     01/11/2023    K 3.9 01/11/2023    CL 99 01/11/2023    CREATININE 0.8 01/11/2023    BUN 16 01/11/2023    CO2 26 01/11/2023    GLUCOSE 114 (H) 01/11/2023    ALT 20 01/11/2023    AST 23 01/11/2023    INR 1.0 01/11/2023     Lab Results   Component Value Date    CKTOTAL 74 04/15/2021    TROPONINI <0.01 04/15/2021     Recent Radiological Studies:  XR KNEE RIGHT (1-2 VIEWS)   Final Result   Transverse fracture distal femoral metadiaphysis with mild angulation and   foreshortening. No dislocation of the knee joint. RECOMMENDATION:   Careful clinical correlation and follow up recommended. XR FEMUR RIGHT (MIN 2 VIEWS)   Final Result   Transverse oblique fracture distal femoral metadiaphysis with mild apex   dorsal angulation and foreshortening. RECOMMENDATION:   Careful clinical correlation and follow up recommended. XR CHEST PORTABLE   Final Result   No acute disease. RECOMMENDATION:   Careful clinical correlation and follow up recommended.              Assessment  Patient Active Problem List    Diagnosis Date Noted    Other fracture of right femur, initial encounter for closed fracture ([facility] [address] 01/11/2023    Fracture of femur, subcapital, closed, right, with malunion, subsequent encounter 01/11/2023    Floating ossicle of ankle     Disorder of right sural nerve Spontaneous rupture of flexor tendon of right foot     Abnormal EKG 04/19/2021    Hypothyroidism 04/15/2021       Plan  Transverse fracture of the right distal femoral metadiaphysis  Orthopedic surgery consulted in ER plan for operative fixation on Friday 1/13  IV pain medication as needed  PT OT eval eventually   NWB  Social work consult due to likely need for rehab  DVT prophylaxis  Please see orders for further management and care. Discharge plan: [facility] over the weekend if stable after surgery    Patient was seen and evaluated by myself and my attending Kristi Strong DO. Assessment and Plan discussed with attending provider, please see attestation for final plan of care. Melanie Chan DO   1/11/2023  9:55 AM      NOTE:  This report was transcribed using voice recognition software. Every effort was made to ensure accuracy; however, inadvertent computerized transcription errors may be present. Addendum: I have personally participated in a face-to-face history and physical exam on the date of service with the patient. I have discussed the case with the resident. I also participated in medical decision making with the resident on the date of service and I agree with all of the pertinent clinical information unless indicated in my editing of the note. I have reviewed and edited the note above based on my findings during my history, exam, and decision making on the same day of service. My additional thoughts:   Lying in bed  Pain is controlled   at bedside  For surgery on 1/13/2023  IV pain meds as needed  Skilled nursing facility on discharge  We discussed the benefit of surgery outweighing the risk considering cardiac risk assessment    Electronically signed by Kristi Strong DO on 1/11/2023 at 10:15 AM    I can be reached through [employer].

## 2023-01-11 NOTE — ED NOTES
Patient's pain is now controlled. Transferred to room 733 via stretcher.      Jagjit Kessler RN  01/11/23 0971

## 2023-01-11 NOTE — PROGRESS NOTES
Patient is currently ordered to be strict bedrest. She uses a bed pan for bathroom needs. She is a every 2 hour turn due to her fx. She has refused her turns throughout the 6a-6p shift. I educated patient on the importance of turning, and highly recommended she does so. Pt still refused to turn. RN Notified.    [facility] PCA

## 2023-01-11 NOTE — PROGRESS NOTES
[facility] Quality Flow/Interdisciplinary Rounds Progress Note        Quality Flow Rounds held on January 11, 2023    Disciplines Attending:  Bedside Nurse, , , and Nursing Unit Leadership    Crow Marinelli was admitted on 1/10/2023 11:20 PM    Anticipated Discharge Date:       Disposition:    Micah Score:  Micah Scale Score: 21    Readmission Risk              Risk of Unplanned Readmission:  8           Discussed patient goal for the day, patient clinical progression, and barriers to discharge.   The following Goal(s) of the Day/Commitment(s) have been identified:   await ortho consult, possible OR tentatively Friday AM, discharge [address], RN  January 11, 2023

## 2023-01-11 NOTE — ED PROVIDER NOTES
Shaina is a 62 y.o. female with a history of orthopedic surgery on her right knee for meniscus tear several years ago       Patient is a 62 y.o. female presents with a chief complaint of right knee pain  This has been occurring for just PTA.  Patient states that it gets better with nothing.  Patient states that it gets worse with nothing.  Patient states that it is severe in severity.  Patient states it was acute in onset.    She notes she was walking down the stairs just prior to coming in with her left leg slipped and she fell onto her right knee.  She did not hit her head nor lose consciousness and she is not on any blood thinners.  Patient reports she is having significant pain in the right thigh and knee.  She states she was not able to get out of bed or walk afterwards and needed her  to carry her to the couch and then EMS to get her out of the house.  Patient denies any numbness or tingling, headache, lightheaded or dizziness, chest pain, abdominal pain, nausea, vomiting, shortness of breath, or any other complaints.  Patient reports improvement in pain with ice and Dilaudid.    Review of Systems   Constitutional:  Negative for chills and fever.        Fall   HENT:  Negative for congestion, sinus pain and sore throat.    Eyes:  Negative for discharge and visual disturbance.   Respiratory:  Negative for cough and shortness of breath.    Cardiovascular:  Negative for chest pain and leg swelling.   Gastrointestinal:  Negative for abdominal pain, constipation, diarrhea, nausea and vomiting.   Endocrine: Negative for polyuria.   Genitourinary:  Negative for difficulty urinating, dysuria, frequency and hematuria.   Musculoskeletal:  Positive for arthralgias (Right knee pain) and gait problem. Negative for joint swelling.   Skin:  Negative for color change and rash.   Neurological:  Negative for dizziness, weakness, light-headedness, numbness and headaches.   All other systems reviewed and are negative.  Physical Exam  Constitutional:       General: She is not in acute distress. Appearance: Normal appearance. HENT:      Head: Normocephalic and atraumatic. Mouth/Throat:      Mouth: Mucous membranes are moist.      Pharynx: Oropharynx is clear. Eyes:      Extraocular Movements: Extraocular movements intact. Conjunctiva/sclera: Conjunctivae normal.      Pupils: Pupils are equal, round, and reactive to light. Cardiovascular:      Rate and Rhythm: Normal rate and regular rhythm. Pulses: Normal pulses. Heart sounds: Normal heart sounds. Pulmonary:      Effort: Pulmonary effort is normal.      Breath sounds: Normal breath sounds. Abdominal:      General: Abdomen is flat. Palpations: Abdomen is soft. Musculoskeletal:      Cervical back: Normal range of motion and neck supple. Right upper leg: Deformity (Lateral aspect distal thigh) and tenderness present. Right knee: Decreased range of motion. Tenderness present over the lateral joint line. Right ankle: No swelling, deformity or ecchymosis. Normal range of motion. Normal pulse. Skin:     General: Skin is warm and dry. Neurological:      General: No focal deficit present. Mental Status: She is alert and oriented to person, place, and time. Psychiatric:         Mood and Affect: Mood normal.         Behavior: Behavior normal.        Procedures     MDM  Number of Diagnoses or Management Options  Other closed fracture of distal end of right femur, initial encounter ([facility] [address]  Diagnosis management comments: History From: Patient and EMS    CONSULTS: (Who and What was discussed)  IP CONSULT TO ORTHOPEDIC SURGERY -agree to consult on and evaluate patient in the morning with hope for surgery in the morning  IP CONSULT TO INTERNAL MEDICINE -agreed to admit to medicine    Social Determinants of Health : None    Chronic Conditions affecting care: Zaynab Tobin has a past medical history of Asthma and Right foot pain. Records Reviewed( Source)  podiatry postop note reviewed -patient well-healing    CC/HPI Summary, DDx, ED Course, and Reassessment:   Differential diagnosis including but not limited to femur fracture versus knee dislocation versus rib fracture  Therefore, chest x-ray, right femur x-ray, right knee x-ray, CBC, BMP, PT/INR were ordered. EKG as documented below. CBC BMP and PT/INR all within normal limits. Chest x-ray unremarkable. X-ray of right femur and right knee revealing transverse distal femoral metadiaphysis fracture into the shaft of the femur with angulation and shortening. Spoke with orthopedic surgery as documented in ED course. Spoke with Maryanne Vergara for Dr. Hector Jauregui who agreed to admit to medicine. Patient initially refusing any pain medications however did accept a milligram of Dilaudid prior to splint placement. Long-leg splint placed by ED tech. Disposition: admit to medicine with orthopedic surgery following           ED Course as of 01/11/23 0253 Wed Jan 11, 2023 0045 Spoke with Dr. Kizzy Busch, orthopedic surgery, who stated pt okay to admit to [address] [place] [address]. He notes he will consult on and evaluate in the morning, requesting pt be placed NPO. Pt not on any anticoagulants. [CP]   2301 Spoke with Patra Aase for Dr. Hector Jauregui who agreed to admit to medicine. [CP]      ED Course User Index  [CP] Lonnie Jacobson DO      ED Course as of 01/11/23 0253 Wed Jan 11, 2023 0045 Spoke with Dr. Kizzy Busch, orthopedic surgery, who stated pt okay to admit to [address] [facility]. He notes he will consult on and evaluate in the morning, requesting pt be placed NPO. Pt not on any anticoagulants.  [CP]   4005 Spoke with Patra Aase for Dr. Hector Jauregui who agreed to admit to medicine. [CP]      ED Course User Index  [CP] Lonnie Jacobson,        --------------------------------------------- PAST HISTORY ---------------------------------------------  Past Medical History:  has a past medical history of Asthma and Right foot pain. Past Surgical History:  has a past surgical history that includes knee surgery (Bilateral); Foot surgery (Right);  section; meniscectomy (Right); Hysterectomy; Ankle surgery (Right, 2021); and Leg Surgery (Right, 10/14/2022). Social History:  reports that she quit smoking about 26 years ago. Her smoking use included cigarettes. She has never used smokeless tobacco. She reports that she does not currently use alcohol. She reports that she does not use drugs. Family History: family history is not on file. The patients home medications have been reviewed.     Allergies: Latex, Aspirin, Bee venom, Codeine, Penicillins, Soap, and Sulfa antibiotics    -------------------------------------------------- RESULTS -------------------------------------------------    LABS:  Results for orders placed or performed during the hospital encounter of 01/10/23   BMP   Result Value Ref Range    Sodium 135 132 - 146 mmol/L    Potassium 4.4 3.5 - 5.0 mmol/L    Chloride 98 98 - 107 mmol/L    CO2 26 22 - 29 mmol/L    Anion Gap 11 7 - 16 mmol/L    Glucose 123 (H) 74 - 99 mg/dL    BUN 18 6 - 23 mg/dL    Creatinine 0.8 0.5 - 1.0 mg/dL    Est, Glom Filt Rate >60 >=60 mL/min/1.73    Calcium 9.8 8.6 - 10.2 mg/dL   Protime-INR   Result Value Ref Range    Protime 10.5 9.3 - 12.4 sec    INR 1.0    CBC with Auto Differential   Result Value Ref Range    WBC 8.9 4.5 - 11.5 E9/L    RBC 4.42 3.50 - 5.50 E12/L    Hemoglobin 12.9 11.5 - 15.5 g/dL    Hematocrit 38.9 34.0 - 48.0 %    MCV 88.0 80.0 - 99.9 fL    MCH 29.2 26.0 - 35.0 pg    MCHC 33.2 32.0 - 34.5 %    RDW 12.8 11.5 - 15.0 fL    Platelets 417 125 - 837 E9/L    MPV 10.6 7.0 - 12.0 fL    Neutrophils % 73.4 43.0 - 80.0 %    Immature Granulocytes % 0.3 0.0 - 5.0 %    Lymphocytes % 16.2 (L) 20.0 - 42.0 %    Monocytes % 7.9 2.0 - 12.0 %    Eosinophils % 1.3 0.0 - 6.0 %    Basophils % 0.9 0.0 - 2.0 %    Neutrophils Absolute 6.53 1.80 - 7.30 E9/L    Immature Granulocytes # 0.03 E9/L    Lymphocytes Absolute 1.44 (L) 1.50 - 4.00 E9/L    Monocytes Absolute 0.70 0.10 - 0.95 E9/L    Eosinophils Absolute 0.12 0.05 - 0.50 E9/L    Basophils Absolute 0.08 0.00 - 0.20 E9/L   SPECIMEN REJECTION   Result Value Ref Range    Rejected Test CBCWD     Reason for Rejection see below    EKG 12 Lead   Result Value Ref Range    Ventricular Rate 75 BPM    Atrial Rate 75 BPM    P-R Interval 162 ms    QRS Duration 96 ms    Q-T Interval 402 ms    QTc Calculation (Bazett) 448 ms    P Axis 47 degrees    R Axis -30 degrees    T Axis 33 degrees       RADIOLOGY:  XR KNEE RIGHT (1-2 VIEWS)   Final Result   Transverse fracture distal femoral metadiaphysis with mild angulation and   foreshortening. No dislocation of the knee joint. RECOMMENDATION:   Careful clinical correlation and follow up recommended. XR FEMUR RIGHT (MIN 2 VIEWS)   Final Result   Transverse oblique fracture distal femoral metadiaphysis with mild apex   dorsal angulation and foreshortening. RECOMMENDATION:   Careful clinical correlation and follow up recommended. XR CHEST PORTABLE   Final Result   No acute disease. RECOMMENDATION:   Careful clinical correlation and follow up recommended. EKG:  This EKG is signed and interpreted by me. Rate: 75  Rhythm: Sinus  Interpretation: non-specific EKG and no acute ST elevations or depressions  Comparison: no previous EKG available      ------------------------- NURSING NOTES AND VITALS REVIEWED ---------------------------  Date / Time Roomed:  1/10/2023 11:20 PM  ED Bed Assignment:  [id number]    The nursing notes within the ED encounter and vital signs as below have been reviewed.      Patient Vitals for the past 24 hrs:   BP Temp Temp src Pulse Resp SpO2 Height Weight   01/11/23 0250 -- -- -- -- 16 100 % -- --   01/11/23 0249 (!) 149/66 98.3 °F (36.8 °C) Oral 82 18 95 % -- --   01/11/23 0211 -- -- -- -- 16 -- -- --   01/11/23 0119 128/65 99 °F (37.2 °C) Oral 80 16 98 % -- --   01/10/23 2341 -- -- -- 86 -- -- -- --   01/10/23 2329 (!) 144/84 99.3 °F (37.4 °C) Oral 88 16 97 % 5' 4\" (1.626 m) 187 lb (84.8 kg)       Oxygen Saturation Interpretation: Normal    ------------------------------------------ PROGRESS NOTES ------------------------------------------  Re-evaluation(s):  Time: 0050  Patients symptoms show no change  Repeat physical examination is not changed    Counseling:  I have spoken with the patient and discussed todays results, in addition to providing specific details for the plan of care and counseling regarding the diagnosis and prognosis. Their questions are answered at this time and they are agreeable with the plan of admission.    --------------------------------- ADDITIONAL PROVIDER NOTES ---------------------------------  Consultations:  Time: 0987. Spoke with Dr. Quan Kingsley. Discussed case. They will admit the patient. This patient's ED course included: a personal history and physicial examination, multiple bedside re-evaluations, IV medications, cardiac monitoring, and continuous pulse oximetry    This patient has remained hemodynamically stable during their ED course. Diagnosis:  1. Other closed fracture of distal end of right femur, initial encounter ([facility] [address]        Disposition:  Patient's disposition: Admit to med/surg floor  Patient's condition is stable. Patient was seen and evaluated by myself and my attending Tomás Hayes MD. Assessment and Plan discussed with attending provider, please see attestation for final plan of care. This note was done using dictation software and there may be some grammatical errors associated with this.     Any Gregory 58, DO  Resident  01/11/23 0368

## 2023-01-12 ENCOUNTER — ANESTHESIA EVENT (OUTPATIENT)
Dept: OPERATING ROOM | Age: 63
End: 2023-01-12
Payer: COMMERCIAL

## 2023-01-12 LAB
ALBUMIN SERPL-MCNC: 4.2 G/DL (ref 3.5–5.2)
ALP BLD-CCNC: 78 U/L (ref 35–104)
ALT SERPL-CCNC: 18 U/L (ref 0–32)
ANION GAP SERPL CALCULATED.3IONS-SCNC: 12 MMOL/L (ref 7–16)
AST SERPL-CCNC: 19 U/L (ref 0–31)
BASOPHILS ABSOLUTE: 0.06 E9/L (ref 0–0.2)
BASOPHILS RELATIVE PERCENT: 0.8 % (ref 0–2)
BILIRUB SERPL-MCNC: 0.5 MG/DL (ref 0–1.2)
BUN BLDV-MCNC: 14 MG/DL (ref 6–23)
CALCIUM SERPL-MCNC: 9.5 MG/DL (ref 8.6–10.2)
CHLORIDE BLD-SCNC: 96 MMOL/L (ref 98–107)
CO2: 26 MMOL/L (ref 22–29)
CREAT SERPL-MCNC: 0.7 MG/DL (ref 0.5–1)
EOSINOPHILS ABSOLUTE: 0.09 E9/L (ref 0.05–0.5)
EOSINOPHILS RELATIVE PERCENT: 1.2 % (ref 0–6)
GFR SERPL CREATININE-BSD FRML MDRD: >60 ML/MIN/1.73
GLUCOSE BLD-MCNC: 105 MG/DL (ref 74–99)
HCT VFR BLD CALC: 39 % (ref 34–48)
HEMOGLOBIN: 12.8 G/DL (ref 11.5–15.5)
IMMATURE GRANULOCYTES #: 0.03 E9/L
IMMATURE GRANULOCYTES %: 0.4 % (ref 0–5)
LYMPHOCYTES ABSOLUTE: 1.22 E9/L (ref 1.5–4)
LYMPHOCYTES RELATIVE PERCENT: 15.8 % (ref 20–42)
MCH RBC QN AUTO: 29.2 PG (ref 26–35)
MCHC RBC AUTO-ENTMCNC: 32.8 % (ref 32–34.5)
MCV RBC AUTO: 88.8 FL (ref 80–99.9)
MONOCYTES ABSOLUTE: 0.75 E9/L (ref 0.1–0.95)
MONOCYTES RELATIVE PERCENT: 9.7 % (ref 2–12)
NEUTROPHILS ABSOLUTE: 5.58 E9/L (ref 1.8–7.3)
NEUTROPHILS RELATIVE PERCENT: 72.1 % (ref 43–80)
PDW BLD-RTO: 13.1 FL (ref 11.5–15)
PLATELET # BLD: 182 E9/L (ref 130–450)
PMV BLD AUTO: 11 FL (ref 7–12)
POTASSIUM REFLEX MAGNESIUM: 4.3 MMOL/L (ref 3.5–5)
RBC # BLD: 4.39 E12/L (ref 3.5–5.5)
SODIUM BLD-SCNC: 134 MMOL/L (ref 132–146)
TOTAL PROTEIN: 6.9 G/DL (ref 6.4–8.3)
WBC # BLD: 7.7 E9/L (ref 4.5–11.5)

## 2023-01-12 PROCEDURE — 2580000003 HC RX 258: Performed by: NURSE PRACTITIONER

## 2023-01-12 PROCEDURE — 80053 COMPREHEN METABOLIC PANEL: CPT

## 2023-01-12 PROCEDURE — 6370000000 HC RX 637 (ALT 250 FOR IP): Performed by: INTERNAL MEDICINE

## 2023-01-12 PROCEDURE — 36415 COLL VENOUS BLD VENIPUNCTURE: CPT

## 2023-01-12 PROCEDURE — 1200000000 HC SEMI PRIVATE

## 2023-01-12 PROCEDURE — 6360000002 HC RX W HCPCS: Performed by: ORTHOPAEDIC SURGERY

## 2023-01-12 PROCEDURE — 85025 COMPLETE CBC W/AUTO DIFF WBC: CPT

## 2023-01-12 RX ORDER — POLYETHYLENE GLYCOL 3350 17 G/17G
17 POWDER, FOR SOLUTION ORAL DAILY
Status: DISCONTINUED | OUTPATIENT
Start: 2023-01-12 | End: 2023-01-13 | Stop reason: SDUPTHER

## 2023-01-12 RX ADMIN — HYDROMORPHONE HYDROCHLORIDE 0.5 MG: 1 INJECTION, SOLUTION INTRAMUSCULAR; INTRAVENOUS; SUBCUTANEOUS at 03:33

## 2023-01-12 RX ADMIN — HYDROMORPHONE HYDROCHLORIDE 0.5 MG: 1 INJECTION, SOLUTION INTRAMUSCULAR; INTRAVENOUS; SUBCUTANEOUS at 17:52

## 2023-01-12 RX ADMIN — HYDROMORPHONE HYDROCHLORIDE 0.5 MG: 1 INJECTION, SOLUTION INTRAMUSCULAR; INTRAVENOUS; SUBCUTANEOUS at 21:26

## 2023-01-12 RX ADMIN — Medication 10 ML: at 21:26

## 2023-01-12 RX ADMIN — Medication 10 ML: at 09:14

## 2023-01-12 RX ADMIN — POLYETHYLENE GLYCOL 3350 17 G: 17 POWDER, FOR SOLUTION ORAL at 17:57

## 2023-01-12 RX ADMIN — HYDROMORPHONE HYDROCHLORIDE 0.5 MG: 1 INJECTION, SOLUTION INTRAMUSCULAR; INTRAVENOUS; SUBCUTANEOUS at 14:40

## 2023-01-12 RX ADMIN — HYDROMORPHONE HYDROCHLORIDE 0.5 MG: 1 INJECTION, SOLUTION INTRAMUSCULAR; INTRAVENOUS; SUBCUTANEOUS at 09:13

## 2023-01-12 ASSESSMENT — PAIN DESCRIPTION - LOCATION
LOCATION: HIP;LEG
LOCATION: HIP
LOCATION: LEG

## 2023-01-12 ASSESSMENT — PAIN DESCRIPTION - DIRECTION: RADIATING_TOWARDS: R FEMUR

## 2023-01-12 ASSESSMENT — PAIN DESCRIPTION - FREQUENCY
FREQUENCY: CONTINUOUS
FREQUENCY: CONTINUOUS

## 2023-01-12 ASSESSMENT — PAIN DESCRIPTION - ONSET
ONSET: ON-GOING
ONSET: ON-GOING

## 2023-01-12 ASSESSMENT — PAIN SCALES - GENERAL
PAINLEVEL_OUTOF10: 7
PAINLEVEL_OUTOF10: 6
PAINLEVEL_OUTOF10: 8
PAINLEVEL_OUTOF10: 7
PAINLEVEL_OUTOF10: 8
PAINLEVEL_OUTOF10: 8

## 2023-01-12 ASSESSMENT — PAIN DESCRIPTION - ORIENTATION
ORIENTATION: RIGHT

## 2023-01-12 ASSESSMENT — PAIN DESCRIPTION - DESCRIPTORS
DESCRIPTORS: ACHING;DISCOMFORT
DESCRIPTORS: ACHING;DISCOMFORT;DULL;SORE;TENDER
DESCRIPTORS: DISCOMFORT

## 2023-01-12 ASSESSMENT — PAIN - FUNCTIONAL ASSESSMENT
PAIN_FUNCTIONAL_ASSESSMENT: ACTIVITIES ARE NOT PREVENTED
PAIN_FUNCTIONAL_ASSESSMENT: ACTIVITIES ARE NOT PREVENTED

## 2023-01-12 ASSESSMENT — PAIN DESCRIPTION - PAIN TYPE
TYPE: ACUTE PAIN
TYPE: ACUTE PAIN

## 2023-01-12 NOTE — PROGRESS NOTES
Internal Medicine Progress Note    Patient's name: Jitendra Wyatt  : 1960  Chief complaints (on day of admission): Fall Feng Millin down a few steps while at home. No LOC. Right lateral distal thigh deformity noted. Patient did not hit head.)  Admission date: 1/10/2023  Date of service: 2023   Room: [facility]  Primary care physician: Dee Danielle DO  Reason for visit: Follow-up for hip fracture    Subjective  Cozetta Schilder was seen and examined. She was lying in bed. Pain is controlled when she is not moving. She is for surgery tomorrow. She denies new complaints or issues at this time. She knows that she will need to go to a rehab facility on discharge. Review of Systems  There are no new complaints of chest pain, shortness of breath, abdominal pain, nausea, vomiting, diarrhea.     Hospital Medications  Current Facility-Administered Medications   Medication Dose Route Frequency Provider Last Rate Last Admin    sodium chloride flush 0.9 % injection 10 mL  10 mL IntraVENous 2 times per day Marilyn Lucien, APRN - CNP   10 mL at 23 0914    sodium chloride flush 0.9 % injection 10 mL  10 mL IntraVENous PRN Marilyn Lucien, APRN - CNP   10 mL at 23 0728    0.9 % sodium chloride infusion   IntraVENous PRN Marilyn Lucien, APRN - CNP        potassium chloride (KLOR-CON M) extended release tablet 40 mEq  40 mEq Oral PRN Marilyn Lucien, APRN - CNP        Or    potassium bicarb-citric acid (EFFER-K) effervescent tablet 40 mEq  40 mEq Oral PRN Marilyn Lucien, APRN - CNP        Or    potassium chloride 10 mEq/100 mL IVPB (Peripheral Line)  10 mEq IntraVENous PRN Marilyn Lucien, APRN - CNP        enoxaparin (LOVENOX) injection 40 mg  40 mg SubCUTAneous Daily Marilyn Lucien, APRN - CNP        ondansetron (ZOFRAN-ODT) disintegrating tablet 4 mg  4 mg Oral Q8H PRN Marilyn Lucien, APRN - CNP        Or    ondansetron (ZOFRAN) injection 4 mg  4 mg IntraVENous Q6H PRN Marilyn Lucien, APRN - CNP        senna (SENOKOT) tablet 8.6 mg  1 tablet Oral Daily PRN Darlina Quarto, APRN - CNP        acetaminophen (TYLENOL) tablet 650 mg  650 mg Oral Q6H PRN Darlina Quarto, APRN - CNP        Or    acetaminophen (TYLENOL) suppository 650 mg  650 mg Rectal Q6H PRN Darlina Quarto, APRN - CNP        HYDROmorphone (DILAUDID) injection 0.5 mg  0.5 mg IntraVENous Q3H PRN Monty Lewis MD   0.5 mg at 01/12/23 0913    oxyCODONE (ROXICODONE) immediate release tablet 5 mg  5 mg Oral Q4H PRN Monty Lewis MD           PRN Medications  sodium chloride flush, sodium chloride, potassium chloride **OR** potassium alternative oral replacement **OR** potassium chloride, ondansetron **OR** ondansetron, senna, acetaminophen **OR** acetaminophen, HYDROmorphone, oxyCODONE    Objective  Most Recent Recorded Vitals  BP (!) 120/54   Pulse 77   Temp 98.2 °F (36.8 °C) (Oral)   Resp 16   Ht 5' 4\" (1.626 m)   Wt 187 lb (84.8 kg)   SpO2 94%   BMI 32.10 kg/m²   I/O last 3 completed shifts:  In: -   Out: 1149 [Urine:1725]  No intake/output data recorded.     Physical Exam:  General: AAO to person/place/time/purpose, NAD, no labored breathing  Eyes: conjunctivae/corneas clear, sclera non icteric  Skin: color/texture/turgor normal, no rashes or lesions  Lungs: CTAB, no retractions/use of accessory muscles, no vocal fremitus, no rhonchi, no crackle, no rales  Heart: regular rate, regular rhythm, no murmur  Abdomen: Mildly distended but soft, NT, bowel sounds normal  Extremities: Wrap noted to the right lower extremity  Neurologic: cranial nerves 2-12 grossly intact, no slurred speech    Most Recent Labs  Lab Results   Component Value Date    WBC 7.7 01/12/2023    HGB 12.8 01/12/2023    HCT 39.0 01/12/2023     01/12/2023     01/12/2023    K 4.3 01/12/2023    CL 96 (L) 01/12/2023    CREATININE 0.7 01/12/2023    BUN 14 01/12/2023    CO2 26 01/12/2023    GLUCOSE 105 (H) 01/12/2023    ALT 18 01/12/2023    AST 19 01/12/2023    INR 1.0 01/11/2023    TSH 3.080 05/03/2022    LABA1C 5.4 05/03/2022    LABMICR <12.0 05/03/2022       XR KNEE RIGHT (1-2 VIEWS)   Final Result   Transverse fracture distal femoral metadiaphysis with mild angulation and   foreshortening. No dislocation of the knee joint. RECOMMENDATION:   Careful clinical correlation and follow up recommended. XR FEMUR RIGHT (MIN 2 VIEWS)   Final Result   Transverse oblique fracture distal femoral metadiaphysis with mild apex   dorsal angulation and foreshortening. RECOMMENDATION:   Careful clinical correlation and follow up recommended. XR CHEST PORTABLE   Final Result   No acute disease. RECOMMENDATION:   Careful clinical correlation and follow up recommended. 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 Discharge plan: [facility] over the weekend if stable after surgery    Constipation:  Initiate bowel regimen    Follow labs   DVT prophylaxis  Please see orders for further management and care. Discharge plan: [facility] after surgery-- likely over the weekend      Electronically signed by Saba Mckinney DO on 1/12/2023 at 9:51 AM    I can be reached through [facility].

## 2023-01-12 NOTE — PLAN OF CARE
Problem: Discharge Planning  Goal: Discharge to home or other facility with appropriate resources  Outcome: Progressing     Problem: Pain  Goal: Verbalizes/displays adequate comfort level or baseline comfort level  1/12/2023 0048 by Gurinder Cedeno RN  Outcome: Progressing     Problem: Safety - Adult  Goal: Free from fall injury  1/12/2023 0048 by Gurinder Cedeno RN  Outcome: Progressing       Problem: ABCDS Injury Assessment  Goal: Absence of physical injury  Outcome: Progressing     Problem: Skin/Tissue Integrity  Goal: Absence of new skin breakdown  Description: 1. Monitor for areas of redness and/or skin breakdown  2. Assess vascular access sites hourly  3. Every 4-6 hours minimum:  Change oxygen saturation probe site  4. Every 4-6 hours:  If on nasal continuous positive airway pressure, respiratory therapy assess nares and determine need for appliance change or resting period.   Outcome: Progressing

## 2023-01-12 NOTE — PLAN OF CARE
Problem: Pain  Goal: Verbalizes/displays adequate comfort level or baseline comfort level  Outcome: Progressing  Flowsheets (Taken 1/11/2023 2610)  Verbalizes/displays adequate comfort level or baseline comfort level: Encourage patient to monitor pain and request assistance     Problem: Safety - Adult  Goal: Free from fall injury  Outcome: Progressing

## 2023-01-12 NOTE — CARE COORDINATION
Social work:    Social work discussed skilled rehab choices, as well as [address] choices, with Vance Bueno who requested she does not like any of the rehabs that are contracted with her insurance and wants to wait to see how she does, as she advised she may not need the [address] and will continue O.P. Social work will need to follow-up post surgery to confirm needs.     Electronically signed by CARLOZ Mejia on 1/12/2023 at 2:37 PM

## 2023-01-13 ENCOUNTER — ANESTHESIA (OUTPATIENT)
Dept: OPERATING ROOM | Age: 63
End: 2023-01-13
Payer: COMMERCIAL

## 2023-01-13 ENCOUNTER — APPOINTMENT (OUTPATIENT)
Dept: GENERAL RADIOLOGY | Age: 63
DRG: 482 | End: 2023-01-13
Payer: COMMERCIAL

## 2023-01-13 LAB
ABO/RH: NORMAL
ANTIBODY SCREEN: NORMAL

## 2023-01-13 PROCEDURE — 2709999900 HC NON-CHARGEABLE SUPPLY: Performed by: GENERAL ACUTE CARE HOSPITAL

## 2023-01-13 PROCEDURE — 6360000002 HC RX W HCPCS: Performed by: GENERAL ACUTE CARE HOSPITAL

## 2023-01-13 PROCEDURE — 2580000003 HC RX 258: Performed by: GENERAL ACUTE CARE HOSPITAL

## 2023-01-13 PROCEDURE — C1769 GUIDE WIRE: HCPCS | Performed by: GENERAL ACUTE CARE HOSPITAL

## 2023-01-13 PROCEDURE — 6360000002 HC RX W HCPCS

## 2023-01-13 PROCEDURE — 6360000002 HC RX W HCPCS: Performed by: ORTHOPAEDIC SURGERY

## 2023-01-13 PROCEDURE — 7100000001 HC PACU RECOVERY - ADDTL 15 MIN: Performed by: GENERAL ACUTE CARE HOSPITAL

## 2023-01-13 PROCEDURE — 2500000003 HC RX 250 WO HCPCS: Performed by: GENERAL ACUTE CARE HOSPITAL

## 2023-01-13 PROCEDURE — 97165 OT EVAL LOW COMPLEX 30 MIN: CPT

## 2023-01-13 PROCEDURE — 2720000010 HC SURG SUPPLY STERILE: Performed by: GENERAL ACUTE CARE HOSPITAL

## 2023-01-13 PROCEDURE — 2500000003 HC RX 250 WO HCPCS

## 2023-01-13 PROCEDURE — 87081 CULTURE SCREEN ONLY: CPT

## 2023-01-13 PROCEDURE — C1713 ANCHOR/SCREW BN/BN,TIS/BN: HCPCS | Performed by: GENERAL ACUTE CARE HOSPITAL

## 2023-01-13 PROCEDURE — 36415 COLL VENOUS BLD VENIPUNCTURE: CPT

## 2023-01-13 PROCEDURE — 86850 RBC ANTIBODY SCREEN: CPT

## 2023-01-13 PROCEDURE — 86901 BLOOD TYPING SEROLOGIC RH(D): CPT

## 2023-01-13 PROCEDURE — 3209999900 FLUORO FOR SURGICAL PROCEDURES

## 2023-01-13 PROCEDURE — 1200000000 HC SEMI PRIVATE

## 2023-01-13 PROCEDURE — 3600000013 HC SURGERY LEVEL 3 ADDTL 15MIN: Performed by: GENERAL ACUTE CARE HOSPITAL

## 2023-01-13 PROCEDURE — 7100000000 HC PACU RECOVERY - FIRST 15 MIN: Performed by: GENERAL ACUTE CARE HOSPITAL

## 2023-01-13 PROCEDURE — 3700000001 HC ADD 15 MINUTES (ANESTHESIA): Performed by: GENERAL ACUTE CARE HOSPITAL

## 2023-01-13 PROCEDURE — 3600000003 HC SURGERY LEVEL 3 BASE: Performed by: GENERAL ACUTE CARE HOSPITAL

## 2023-01-13 PROCEDURE — 2580000003 HC RX 258: Performed by: NURSE PRACTITIONER

## 2023-01-13 PROCEDURE — 3700000000 HC ANESTHESIA ATTENDED CARE: Performed by: GENERAL ACUTE CARE HOSPITAL

## 2023-01-13 PROCEDURE — 0QSB06Z REPOSITION RIGHT LOWER FEMUR WITH INTRAMEDULLARY INTERNAL FIXATION DEVICE, OPEN APPROACH: ICD-10-PCS | Performed by: GENERAL ACUTE CARE HOSPITAL

## 2023-01-13 PROCEDURE — 6360000002 HC RX W HCPCS: Performed by: ANESTHESIOLOGY

## 2023-01-13 PROCEDURE — 97530 THERAPEUTIC ACTIVITIES: CPT

## 2023-01-13 PROCEDURE — 86900 BLOOD TYPING SEROLOGIC ABO: CPT

## 2023-01-13 DEVICE — SCREW LK F/IM NAIL 5X34MM XL25 ST: Type: IMPLANTABLE DEVICE | Site: FEMUR | Status: FUNCTIONAL

## 2023-01-13 DEVICE — IMPLANTABLE DEVICE: Type: IMPLANTABLE DEVICE | Site: FEMUR | Status: FUNCTIONAL

## 2023-01-13 DEVICE — SCREW VA LCK SCREOPTILINK 5.0X55MM: Type: IMPLANTABLE DEVICE | Site: FEMUR | Status: FUNCTIONAL

## 2023-01-13 DEVICE — LOCKING ATTACHMENT WASHER F/ RFNA / 5 DEG/ RIGHT/ STER: Type: IMPLANTABLE DEVICE | Site: FEMUR | Status: FUNCTIONAL

## 2023-01-13 DEVICE — SCREW BNE L54MM DIA3.5MM PROX TIB S STL ST FULL THRD T15: Type: IMPLANTABLE DEVICE | Site: FEMUR | Status: FUNCTIONAL

## 2023-01-13 DEVICE — LOCKING SCREW FOR IM NAIL Ø 5MM/ 68MM/ XL25/ STERILE: Type: IMPLANTABLE DEVICE | Site: FEMUR | Status: FUNCTIONAL

## 2023-01-13 DEVICE — SCREW OPTILINK VA LCKING SLF -TP T25 SD 5.0X70MM: Type: IMPLANTABLE DEVICE | Site: FEMUR | Status: FUNCTIONAL

## 2023-01-13 DEVICE — SCREW BNE L56MM DIA3.5MM PROX TIB S STL ST FULL THRD T15: Type: IMPLANTABLE DEVICE | Site: FEMUR | Status: FUNCTIONAL

## 2023-01-13 RX ORDER — FENTANYL CITRATE 50 UG/ML
INJECTION, SOLUTION INTRAMUSCULAR; INTRAVENOUS PRN
Status: DISCONTINUED | OUTPATIENT
Start: 2023-01-13 | End: 2023-01-13 | Stop reason: SDUPTHER

## 2023-01-13 RX ORDER — SODIUM CHLORIDE 0.9 % (FLUSH) 0.9 %
5-40 SYRINGE (ML) INJECTION PRN
Status: DISCONTINUED | OUTPATIENT
Start: 2023-01-13 | End: 2023-01-13 | Stop reason: HOSPADM

## 2023-01-13 RX ORDER — MORPHINE SULFATE 2 MG/ML
2 INJECTION, SOLUTION INTRAMUSCULAR; INTRAVENOUS
Status: DISCONTINUED | OUTPATIENT
Start: 2023-01-13 | End: 2023-01-15 | Stop reason: HOSPADM

## 2023-01-13 RX ORDER — BUPIVACAINE HYDROCHLORIDE 5 MG/ML
INJECTION, SOLUTION EPIDURAL; INTRACAUDAL PRN
Status: DISCONTINUED | OUTPATIENT
Start: 2023-01-13 | End: 2023-01-13 | Stop reason: ALTCHOICE

## 2023-01-13 RX ORDER — SODIUM CHLORIDE 0.9 % (FLUSH) 0.9 %
5-40 SYRINGE (ML) INJECTION EVERY 12 HOURS SCHEDULED
Status: DISCONTINUED | OUTPATIENT
Start: 2023-01-13 | End: 2023-01-13 | Stop reason: HOSPADM

## 2023-01-13 RX ORDER — MIDAZOLAM HYDROCHLORIDE 1 MG/ML
INJECTION INTRAMUSCULAR; INTRAVENOUS PRN
Status: DISCONTINUED | OUTPATIENT
Start: 2023-01-13 | End: 2023-01-13 | Stop reason: SDUPTHER

## 2023-01-13 RX ORDER — ONDANSETRON 4 MG/1
4 TABLET, ORALLY DISINTEGRATING ORAL EVERY 8 HOURS PRN
Status: DISCONTINUED | OUTPATIENT
Start: 2023-01-13 | End: 2023-01-15 | Stop reason: HOSPADM

## 2023-01-13 RX ORDER — PROPOFOL 10 MG/ML
INJECTION, EMULSION INTRAVENOUS PRN
Status: DISCONTINUED | OUTPATIENT
Start: 2023-01-13 | End: 2023-01-13 | Stop reason: SDUPTHER

## 2023-01-13 RX ORDER — POLYETHYLENE GLYCOL 3350 17 G/17G
17 POWDER, FOR SOLUTION ORAL DAILY
Status: DISCONTINUED | OUTPATIENT
Start: 2023-01-13 | End: 2023-01-15 | Stop reason: HOSPADM

## 2023-01-13 RX ORDER — MIDAZOLAM HYDROCHLORIDE 2 MG/2ML
2 INJECTION, SOLUTION INTRAMUSCULAR; INTRAVENOUS
Status: DISCONTINUED | OUTPATIENT
Start: 2023-01-13 | End: 2023-01-13 | Stop reason: HOSPADM

## 2023-01-13 RX ORDER — LIDOCAINE HYDROCHLORIDE 20 MG/ML
INJECTION, SOLUTION EPIDURAL; INFILTRATION; INTRACAUDAL; PERINEURAL PRN
Status: DISCONTINUED | OUTPATIENT
Start: 2023-01-13 | End: 2023-01-13 | Stop reason: SDUPTHER

## 2023-01-13 RX ORDER — ONDANSETRON 2 MG/ML
4 INJECTION INTRAMUSCULAR; INTRAVENOUS
Status: DISCONTINUED | OUTPATIENT
Start: 2023-01-13 | End: 2023-01-13 | Stop reason: HOSPADM

## 2023-01-13 RX ORDER — LABETALOL HYDROCHLORIDE 5 MG/ML
10 INJECTION, SOLUTION INTRAVENOUS
Status: DISCONTINUED | OUTPATIENT
Start: 2023-01-13 | End: 2023-01-13 | Stop reason: HOSPADM

## 2023-01-13 RX ORDER — SODIUM CHLORIDE 0.9 % (FLUSH) 0.9 %
5-40 SYRINGE (ML) INJECTION PRN
Status: DISCONTINUED | OUTPATIENT
Start: 2023-01-13 | End: 2023-01-15 | Stop reason: HOSPADM

## 2023-01-13 RX ORDER — HYDRALAZINE HYDROCHLORIDE 20 MG/ML
10 INJECTION INTRAMUSCULAR; INTRAVENOUS
Status: DISCONTINUED | OUTPATIENT
Start: 2023-01-13 | End: 2023-01-13 | Stop reason: HOSPADM

## 2023-01-13 RX ORDER — MEPERIDINE HYDROCHLORIDE 25 MG/ML
12.5 INJECTION INTRAMUSCULAR; INTRAVENOUS; SUBCUTANEOUS EVERY 5 MIN PRN
Status: COMPLETED | OUTPATIENT
Start: 2023-01-13 | End: 2023-01-13

## 2023-01-13 RX ORDER — IPRATROPIUM BROMIDE AND ALBUTEROL SULFATE 2.5; .5 MG/3ML; MG/3ML
1 SOLUTION RESPIRATORY (INHALATION)
Status: DISCONTINUED | OUTPATIENT
Start: 2023-01-13 | End: 2023-01-13 | Stop reason: HOSPADM

## 2023-01-13 RX ORDER — OXYCODONE HYDROCHLORIDE 5 MG/1
10 TABLET ORAL EVERY 4 HOURS PRN
Status: DISCONTINUED | OUTPATIENT
Start: 2023-01-13 | End: 2023-01-15 | Stop reason: HOSPADM

## 2023-01-13 RX ORDER — FENTANYL CITRATE 50 UG/ML
25 INJECTION, SOLUTION INTRAMUSCULAR; INTRAVENOUS EVERY 5 MIN PRN
Status: DISCONTINUED | OUTPATIENT
Start: 2023-01-13 | End: 2023-01-13 | Stop reason: HOSPADM

## 2023-01-13 RX ORDER — SODIUM CHLORIDE 0.9 % (FLUSH) 0.9 %
5-40 SYRINGE (ML) INJECTION EVERY 12 HOURS SCHEDULED
Status: DISCONTINUED | OUTPATIENT
Start: 2023-01-13 | End: 2023-01-13

## 2023-01-13 RX ORDER — ONDANSETRON 2 MG/ML
INJECTION INTRAMUSCULAR; INTRAVENOUS PRN
Status: DISCONTINUED | OUTPATIENT
Start: 2023-01-13 | End: 2023-01-13 | Stop reason: SDUPTHER

## 2023-01-13 RX ORDER — SODIUM CHLORIDE 9 MG/ML
INJECTION, SOLUTION INTRAVENOUS PRN
Status: DISCONTINUED | OUTPATIENT
Start: 2023-01-13 | End: 2023-01-13 | Stop reason: HOSPADM

## 2023-01-13 RX ORDER — ENOXAPARIN SODIUM 100 MG/ML
40 INJECTION SUBCUTANEOUS DAILY
Status: DISCONTINUED | OUTPATIENT
Start: 2023-01-13 | End: 2023-01-15 | Stop reason: HOSPADM

## 2023-01-13 RX ORDER — OXYCODONE HYDROCHLORIDE 5 MG/1
5 TABLET ORAL EVERY 4 HOURS PRN
Status: DISCONTINUED | OUTPATIENT
Start: 2023-01-13 | End: 2023-01-15 | Stop reason: HOSPADM

## 2023-01-13 RX ORDER — ONDANSETRON 2 MG/ML
4 INJECTION INTRAMUSCULAR; INTRAVENOUS EVERY 6 HOURS PRN
Status: DISCONTINUED | OUTPATIENT
Start: 2023-01-13 | End: 2023-01-15 | Stop reason: HOSPADM

## 2023-01-13 RX ORDER — ROCURONIUM BROMIDE 10 MG/ML
INJECTION, SOLUTION INTRAVENOUS PRN
Status: DISCONTINUED | OUTPATIENT
Start: 2023-01-13 | End: 2023-01-13 | Stop reason: SDUPTHER

## 2023-01-13 RX ORDER — FENTANYL CITRATE 50 UG/ML
50 INJECTION, SOLUTION INTRAMUSCULAR; INTRAVENOUS EVERY 5 MIN PRN
Status: DISCONTINUED | OUTPATIENT
Start: 2023-01-13 | End: 2023-01-13 | Stop reason: HOSPADM

## 2023-01-13 RX ORDER — SODIUM CHLORIDE 9 MG/ML
INJECTION, SOLUTION INTRAVENOUS PRN
Status: DISCONTINUED | OUTPATIENT
Start: 2023-01-13 | End: 2023-01-15 | Stop reason: HOSPADM

## 2023-01-13 RX ORDER — DEXAMETHASONE SODIUM PHOSPHATE 4 MG/ML
INJECTION, SOLUTION INTRA-ARTICULAR; INTRALESIONAL; INTRAMUSCULAR; INTRAVENOUS; SOFT TISSUE PRN
Status: DISCONTINUED | OUTPATIENT
Start: 2023-01-13 | End: 2023-01-13 | Stop reason: SDUPTHER

## 2023-01-13 RX ORDER — SODIUM CHLORIDE 9 MG/ML
25 INJECTION, SOLUTION INTRAVENOUS PRN
Status: DISCONTINUED | OUTPATIENT
Start: 2023-01-13 | End: 2023-01-13 | Stop reason: HOSPADM

## 2023-01-13 RX ORDER — MORPHINE SULFATE 4 MG/ML
4 INJECTION, SOLUTION INTRAMUSCULAR; INTRAVENOUS
Status: DISCONTINUED | OUTPATIENT
Start: 2023-01-13 | End: 2023-01-15 | Stop reason: HOSPADM

## 2023-01-13 RX ADMIN — FENTANYL CITRATE 50 MCG: 50 INJECTION, SOLUTION INTRAMUSCULAR; INTRAVENOUS at 07:29

## 2023-01-13 RX ADMIN — ROCURONIUM BROMIDE 45 MG: 50 INJECTION INTRAVENOUS at 07:31

## 2023-01-13 RX ADMIN — ROCURONIUM BROMIDE 20 MG: 50 INJECTION INTRAVENOUS at 08:06

## 2023-01-13 RX ADMIN — ONDANSETRON 4 MG: 2 INJECTION INTRAMUSCULAR; INTRAVENOUS at 10:04

## 2023-01-13 RX ADMIN — FENTANYL CITRATE 50 MCG: 50 INJECTION, SOLUTION INTRAMUSCULAR; INTRAVENOUS at 10:10

## 2023-01-13 RX ADMIN — SODIUM CHLORIDE: 9 INJECTION, SOLUTION INTRAVENOUS at 07:11

## 2023-01-13 RX ADMIN — ENOXAPARIN SODIUM 40 MG: 100 INJECTION SUBCUTANEOUS at 21:37

## 2023-01-13 RX ADMIN — MEPERIDINE HYDROCHLORIDE 12.5 MG: 25 INJECTION, SOLUTION INTRAMUSCULAR; INTRAVENOUS; SUBCUTANEOUS at 10:57

## 2023-01-13 RX ADMIN — Medication 10 ML: at 21:37

## 2023-01-13 RX ADMIN — FENTANYL CITRATE 50 MCG: 50 INJECTION, SOLUTION INTRAMUSCULAR; INTRAVENOUS at 09:06

## 2023-01-13 RX ADMIN — LIDOCAINE HYDROCHLORIDE 60 MG: 20 INJECTION, SOLUTION EPIDURAL; INFILTRATION; INTRACAUDAL; PERINEURAL at 07:31

## 2023-01-13 RX ADMIN — MIDAZOLAM 2 MG: 1 INJECTION INTRAMUSCULAR; INTRAVENOUS at 07:22

## 2023-01-13 RX ADMIN — SUGAMMADEX 170 MG: 100 INJECTION, SOLUTION INTRAVENOUS at 10:43

## 2023-01-13 RX ADMIN — HYDROMORPHONE HYDROCHLORIDE 0.5 MG: 1 INJECTION, SOLUTION INTRAMUSCULAR; INTRAVENOUS; SUBCUTANEOUS at 11:31

## 2023-01-13 RX ADMIN — MEPERIDINE HYDROCHLORIDE 12.5 MG: 25 INJECTION, SOLUTION INTRAMUSCULAR; INTRAVENOUS; SUBCUTANEOUS at 11:04

## 2023-01-13 RX ADMIN — HYDROMORPHONE HYDROCHLORIDE 0.5 MG: 1 INJECTION, SOLUTION INTRAMUSCULAR; INTRAVENOUS; SUBCUTANEOUS at 06:09

## 2023-01-13 RX ADMIN — WATER 2000 MG: 1 INJECTION INTRAMUSCULAR; INTRAVENOUS; SUBCUTANEOUS at 16:44

## 2023-01-13 RX ADMIN — FENTANYL CITRATE 50 MCG: 50 INJECTION, SOLUTION INTRAMUSCULAR; INTRAVENOUS at 08:18

## 2023-01-13 RX ADMIN — ROCURONIUM BROMIDE 10 MG: 50 INJECTION INTRAVENOUS at 08:57

## 2023-01-13 RX ADMIN — HYDROMORPHONE HYDROCHLORIDE 0.5 MG: 1 INJECTION, SOLUTION INTRAMUSCULAR; INTRAVENOUS; SUBCUTANEOUS at 03:09

## 2023-01-13 RX ADMIN — DEXAMETHASONE SODIUM PHOSPHATE 10 MG: 4 INJECTION, SOLUTION INTRAMUSCULAR; INTRAVENOUS at 08:02

## 2023-01-13 RX ADMIN — WATER 2000 MG: 1 INJECTION INTRAMUSCULAR; INTRAVENOUS; SUBCUTANEOUS at 07:52

## 2023-01-13 RX ADMIN — PROPOFOL 120 MG: 10 INJECTION, EMULSION INTRAVENOUS at 07:31

## 2023-01-13 ASSESSMENT — PAIN - FUNCTIONAL ASSESSMENT
PAIN_FUNCTIONAL_ASSESSMENT: 0-10
PAIN_FUNCTIONAL_ASSESSMENT: ACTIVITIES ARE NOT PREVENTED
PAIN_FUNCTIONAL_ASSESSMENT: ACTIVITIES ARE NOT PREVENTED

## 2023-01-13 ASSESSMENT — PAIN SCALES - GENERAL
PAINLEVEL_OUTOF10: 7
PAINLEVEL_OUTOF10: 9
PAINLEVEL_OUTOF10: 7

## 2023-01-13 ASSESSMENT — PAIN DESCRIPTION - ORIENTATION
ORIENTATION: RIGHT

## 2023-01-13 ASSESSMENT — PAIN DESCRIPTION - LOCATION
LOCATION: LEG;HIP
LOCATION: KNEE
LOCATION: LEG;HIP

## 2023-01-13 ASSESSMENT — PAIN DESCRIPTION - DESCRIPTORS: DESCRIPTORS: ACHING;DISCOMFORT;PRESSURE;SORE;TENDER

## 2023-01-13 NOTE — OP NOTE
Operative Note      Patient: Burna Cockayne  YOB: 1960  MRN: [id number]    Date of Procedure: 1/13/2023    Pre-Op Diagnosis: Closed fracture of right femur, unspecified fracture morphology, unspecified portion of femur, initial encounter ([facility] Maria R Caprice    Post-Op Diagnosis: Same       Procedure(s):  RETROGRADE INTRAMEDULLARY NAILING RIGHT FEMUR  +++SYNTHES+++   +++LATEX ALLERGY+++   (STAFF TO OR 9)    Surgeon(s):  Aamir Done, DO    Assistant:   * No surgical staff found *    Anesthesia: General    Estimated Blood Loss (mL): less than 50     Complications: None    Specimens:   * No specimens in log *    Implants:  Implant Name Type Inv. Item Serial No.  Lot No. LRB No. Used Action   WASHER LKNG STTSCH RFNA 5 DEG RT ST - [id number]  WASHER LKNG STTSCH RFNA 5 DEG RT ST  [facility] [id number] Right 1 Implanted   NAIL RFNA [id number] 5DEG BEND ST - [id number]  NAIL RFNA [id number] 5DEG BEND ST  [facility] [id number] Right 1 Implanted   SCREW LK F/IM NAIL 5X68MM XL25 STERILE - [id number]  SCREW LK F/IM NAIL 5X68MM XL25 STERILE  [facility] [id number] Right 1 Implanted   SCREW VA LCK SCREOPTILINK 5.0X55MM - [id number]  SCREW VA LCK SCREOPTILINK 5.0X55MM  [facility]  Right 1 Implanted   SCREW OPTILINK VA LCKING SLF -TP T25 SD 5.0X70MM - [id number]  SCREW OPTILINK VA LCKING SLF -TP T25 SD 5.0X70MM  [facility] [employer]  Right 1 Implanted   SCREW BNE L54MM DIA3. 5MM PROX TIB S STL ST FULL THRD T15 - [id number]  SCREW BNE L54MM DIA3. 5MM PROX TIB S STL ST FULL THRD T15  [facility]  Right 1 Implanted   SCREW BNE L56MM DIA3. 5MM PROX TIB S STL ST FULL THRD T15 - [id number]  SCREW BNE L56MM DIA3. 5MM PROX TIB S STL ST FULL THRD T15  [employer]  Right 1 Implanted   SCREW LK F/IM NAIL 5X34MM XL25 ST - [id number]  SCREW LK F/IM NAIL 5X34MM XL25 [facility]-WD  Right 1 Implanted   SCREW LK F/IM NAIL 5X34MM XL25  - [id number]  SCREW LK F/IM NAIL 5X34MM XL25 [facility]-WD  Right 1 Implanted         Drains: * No LDAs found *    Findings: Oblique fracture at the distal metadiaphyseal junction of the femur, right lower extremity    Indications for procedure this patient is a 58-year-old female. She was initially seen in consultation after sustaining mechanical fall down several steps at home. X-rays were obtained and diagnosed her with a displaced fracture of the right distal femur. We discussed ORIF with a retrograde nail. All risk, benefits alternatives treatment were reviewed. Informed consent was obtained and verified. Detailed Description of Procedure:   Patient was greeted in the preoperative holding area. All final questions were answered. The correct operative lower extremity was then marked with an indelible skin marker. Informed consent was verified. The patient was then taken to the operative suite placed in the supine position on the operating table. All bony prominences were well-padded. General anesthesia was induced per the anesthesia team.  A dose of preoperative antibiotics was given. The correct operative lower extremity was then prepped and draped in a standard orthopedic fashion. A timeout was performed which the patient, operative extremity as well as the procedure were confirmed by all parties present. I began by making a longitudinal incision directly over the patellar tendon. Hemostasis was achieved using the Bovie cautery. I dissected down to the level of the peritenon and this was sharply incised with a fresh #15 scalpel blade. I then incised through the peritenon and identified the patellar tendon. A longitudinal split was made within the tendon at its mid substance. Using a threaded tip guidewire, this was then placed in the appropriate position about the distal femur. The guidewire was aimed for the lateral aspect of the PCL insertion at the femoral notch.   On the lateral x-ray, the wire was positioned at the extension of Blumensaat's line. Once this positioning was satisfactory, the guidewire was advanced several centimeters. I then inserted a soft tissue protector over the threaded tip guidewire and used the opening reamer to create an appropriate  hole. A ball-tipped guidewire was then inserted through the  hole. Longitudinal traction was placed about the knee and a metal triangle was used to help assist with the reduction maneuver. I was able to pass the ball-tipped guidewire into the proximal fragment though there was still displacement at the fracture site. A separate longitudinal incision was then made at the lateral aspect of the distal femur. I identified the iliotibial band and this was sharply incised. The vastus lateralis was identified and elevated deep to the IT band using Bovie cautery. Identified the lateral aspect of the femur. I was able to pass a pointed reduction clamp around the fracture site. Well fine-tuning the reduction, the clamp was held into position and secured. This allowed excellent reduction at the fracture site. With the ball-tipped guidewire in place, just several millimeters beyond the level of the lesser trochanter, a depth gauge was used to determine nail length. I then began sequentially reaming until I reached a diameter of 12 mm. We carefully increase the reaming diameter by increments of 0.5 mm until appropriate cortical chatter was appreciated. At this point, I chose to proceed with insertion of 11 mm diameter retrograde nail. The nail was assembled on the back table and secured to the targeting instrument. This was then surpassed and inserted to the appropriate depth. The ball-tipped guidewire was then removed. Care was taken to ensure that the most distal aspect of the retrograde nail was buried within the distal femur. The attachment to the insertion handle/targeter was then attached.   I first began by inserting a sleeve for the medial oblique distal locking screw within the nail. A percutaneous stab incision was made, I drilled bicortically, determine screw length, and then inserted the appropriate locking screw. On the back table, I assembled the sideplate/washer construct to provide extra fixation distally. This was positioned in the optimal location about the lateral femoral condyle. I then used the targeter in order to drill to bicortical locking screws which extended from the washer/plate through the nail and through the far cortex of the femur. Screw length was determined and the screws were then inserted. Similarly, 2 additional anterior distal locking screws were passed through the washer. These did not also passed through the nail but provided extra cortical purchase and stabilization in the distal fragment. I then turned my attention proximally. Perfect circles were obtained on the AP projection. Percutaneous stab incisions were made, I drilled bicortically, used a depth gauge to determine screw length, and then inserted the appropriate length proximal locking screws. This point, good cortical purchase was obtained both proximally and distally. This allowed me to remove the reduction clamp and reduction was maintained at the fracture site. All wounds were copiously irrigated using normal saline. Final imaging was then obtained. An anatomic closure was performed in a standard orthopedic fashion, closing the patellar tendon and peritenon as well as the IT band with 0 Vicryl suture. I then closed the subcutaneous tissues of the incision with 2-0 Vicryl suture and staples were used to close the skin incisions. Approximately 30 cc of local anesthetic was injected into the subcutaneous tissues about the incisions. Sterile dressings were then placed, anesthesia was reversed and patient was taken recovery in stable condition. There were no apparent complications.     Disposition: Patient will be taken back to her room where she will receive 24 hours of antibiotics. PT and OT will begin as well as a case management consult for discharge planning. She may weight-bear as tolerated on the operative right lower extremity and mobilize her right knee immediately. We will begin Lovenox for standard DVT prophylaxis, 40 mg subcu to begin tomorrow.     Electronically signed by Raj Rios DO on 1/13/2023 at 10:58 AM

## 2023-01-13 NOTE — CARE COORDINATION
Updated plan of care. Post op IM nailing of right femur. Went to see pt. Discussed choices for [facility] and pt does not want to discuss this now. She wants home. She stated she has all her equipment. Referral called to [facility] care, I told pt I would return to get KIA choice, if needed, if she does not do well with therapy. Will follow-o    ADDEND: Pt insists on home. Does not want to give [facility] choices. Therapies working with her.  [place] [facility] following-orders completed-o

## 2023-01-13 NOTE — PROGRESS NOTES
Internal Medicine Progress Note    Patient's name: Ligia Mortensen  : 1960  Chief complaints (on day of admission): Fall Marissa Small down a few steps while at home. No LOC. Right lateral distal thigh deformity noted. Patient did not hit head.)  Admission date: 1/10/2023  Date of service: 2023   Room: [address]  Primary care physician: Robin Weinstein DO  Reason for visit: Follow-up for hip fracture    Subjective  Verona Francois was not seen today she was in the OR, I attempted to see the patient this morning as did Dr Rylie Ross    Please call with any issues post operatively     Review of Systems not obtained today   There are no new complaints of chest pain, shortness of breath, abdominal pain, nausea, vomiting, diarrhea.     Hospital Medications  Current Facility-Administered Medications   Medication Dose Route Frequency Provider Last Rate Last Admin    sodium chloride flush 0.9 % injection 5-40 mL  5-40 mL IntraVENous 2 times per day Alysha Yates, DO        sodium chloride flush 0.9 % injection 5-40 mL  5-40 mL IntraVENous PRN Alysha Yates, DO        0.9 % sodium chloride infusion   IntraVENous PRN Alysha Yates, DO        ceFAZolin (ANCEF) 2,000 mg in sterile water 20 mL IV syringe  2,000 mg IntraVENous See Admin Instructions Alysha Yates, DO        polyethylene glycol (GLYCOLAX) packet 17 g  17 g Oral Daily David Waggoner, DO   17 g at 23 1757    sodium chloride flush 0.9 % injection 10 mL  10 mL IntraVENous 2 times per day Ninfa Moctezuma APRN - CNP   10 mL at 23 2126    sodium chloride flush 0.9 % injection 10 mL  10 mL IntraVENous PRN Ninfa Moctezuma APRN - CNP   10 mL at 23 0728    0.9 % sodium chloride infusion   IntraVENous PRN Ninfa Douglasbes, APRN - CNP        potassium chloride (KLOR-CON M) extended release tablet 40 mEq  40 mEq Oral PRN Ninfa Misaelbes, APRN - CNP        Or    potassium bicarb-citric acid (EFFER-K) effervescent tablet 40 mEq  40 mEq Oral PRN Lacretia Heart, APRN - CNP        Or    potassium chloride 10 mEq/100 mL IVPB (Peripheral Line)  10 mEq IntraVENous PRN Lacretia Heart, APRN - CNP        enoxaparin (LOVENOX) injection 40 mg  40 mg SubCUTAneous Daily Lacretia Heart, APRN - CNP        ondansetron (ZOFRAN-ODT) disintegrating tablet 4 mg  4 mg Oral Q8H PRN Lacretia Heart, APRN - CNP        Or    ondansetron (ZOFRAN) injection 4 mg  4 mg IntraVENous Q6H PRN Lacretia Heart, APRN - CNP        senna (SENOKOT) tablet 8.6 mg  1 tablet Oral Daily PRN Lacretia Heart, APRN - CNP        acetaminophen (TYLENOL) tablet 650 mg  650 mg Oral Q6H PRN Lacretia Heart, APRN - CNP        Or    acetaminophen (TYLENOL) suppository 650 mg  650 mg Rectal Q6H PRN Lacretia Heart, APRN - CNP        HYDROmorphone (DILAUDID) injection 0.5 mg  0.5 mg IntraVENous Q3H PRN Jose Torres MD   0.5 mg at 01/13/23 1683    oxyCODONE (ROXICODONE) immediate release tablet 5 mg  5 mg Oral Q4H PRN Jose Torres MD           PRN Medications  sodium chloride flush, sodium chloride, sodium chloride flush, sodium chloride, potassium chloride **OR** potassium alternative oral replacement **OR** potassium chloride, ondansetron **OR** ondansetron, senna, acetaminophen **OR** acetaminophen, HYDROmorphone, oxyCODONE    Objective  Most Recent Recorded Vitals  /66   Pulse 87   Temp 99 °F (37.2 °C) (Oral)   Resp 16   Ht 5' 4\" (1.626 m)   Wt 187 lb (84.8 kg)   SpO2 95%   BMI 32.10 kg/m²   I/O last 3 completed shifts:  In: -   Out: 0974 [Urine:3075]  No intake/output data recorded.     Physical Exam:   General: AAO to person/place/time/purpose, NAD, no labored breathing  Eyes: conjunctivae/corneas clear, sclera non icteric  Skin: color/texture/turgor normal, no rashes or lesions  Lungs: CTAB, no retractions/use of accessory muscles, no vocal fremitus, no rhonchi, no crackle, no rales  Heart: regular rate, regular rhythm, no murmur  Abdomen: Mildly distended but soft, NT, bowel sounds normal  Extremities: Wrap noted to the right lower extremity  Neurologic: cranial nerves 2-12 grossly intact, no slurred speech    Most Recent Labs  Lab Results   Component Value Date    WBC 7.7 01/12/2023    HGB 12.8 01/12/2023    HCT 39.0 01/12/2023     01/12/2023     01/12/2023    K 4.3 01/12/2023    CL 96 (L) 01/12/2023    CREATININE 0.7 01/12/2023    BUN 14 01/12/2023    CO2 26 01/12/2023    GLUCOSE 105 (H) 01/12/2023    ALT 18 01/12/2023    AST 19 01/12/2023    INR 1.0 01/11/2023    TSH 3.080 05/03/2022    LABA1C 5.4 05/03/2022    LABMICR <12.0 05/03/2022       XR KNEE RIGHT (1-2 VIEWS)   Final Result   Transverse fracture distal femoral metadiaphysis with mild angulation and   foreshortening. No dislocation of the knee joint. RECOMMENDATION:   Careful clinical correlation and follow up recommended. XR FEMUR RIGHT (MIN 2 VIEWS)   Final Result   Transverse oblique fracture distal femoral metadiaphysis with mild apex   dorsal angulation and foreshortening. RECOMMENDATION:   Careful clinical correlation and follow up recommended. XR CHEST PORTABLE   Final Result   No acute disease. RECOMMENDATION:   Careful clinical correlation and follow up recommended.          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 Discharge plan: [facility]IA after surgery-- likely over the weekend      Electronically signed by Huy Larry MD on 1/13/2023 at 11:35 AM    I can be reached through [facility].

## 2023-01-13 NOTE — PROGRESS NOTES
Occupational Therapy    OCCUPATIONAL THERAPY INITIAL EVALUATION     Citlali Whalen [place], [place]         SDWP:                                                  Patient Name: Burna Cockayne    MRN: [id number]    : 1960    Room: [address] [place], [place] [address]      Evaluating OT: Krissy Beckett OTR/L   [id number]      Referring Provider:Peña Yates DO    Specific Provider Orders/Date:OT eval and treat 2023      Diagnosis:  Other fracture of right femur, initial encounter for closed fracture ([facility] [S72.8X1A]  Fracture of femur, subcapital, closed, right, with malunion, subsequent encounter [S72.011P]    SProcedure(s):  RETROGRADE INTRAMEDULLARY NAILING RIGHT FEMUR   2023     Pertinent Medical History: asthma,  R foot/ankle  surgery 10/2022     Precautions:  Fall Risk, WBAT R LE     Assessment of current deficits    [x] Functional mobility  [x]ADLs  [x] Strength               []Cognition    [x] Functional transfers   [x] IADLs         [x] Safety Awareness   [x]Endurance    [] Fine Coordination              [x] Balance      [] Vision/perception   []Sensation     []Gross Motor Coordination  [] ROM  [] Delirium                   [] Motor Control     OT PLAN OF CARE   OT POC based on physician orders, patient diagnosis and results of clinical assessment    Frequency/Duration  2-4 days/wk for 2 weeks PRN   Specific OT Treatment Interventions to include:   ADL retraining/adapted techniques and AE recommendations to increase functional independence within precautions                    Energy conservation techniques to improve tolerance for selfcare routine   Functional transfer/mobility training/DME recommendations for increased independence, safety and fall prevention         Patient/family education to increase safety and functional independence             Environmental modifications for safe mobility and completion of ADLs Therapeutic activity to improve functional performance during ADLs. Therapeutic exercise to improve tolerance and functional strength for ADLs    Balance retraining/tolerance tasks for facilitation of postural control with dynamic challenges during ADLs .       Positioning to improve functional independence  []    Recommended Adaptive Equipment: TBD     Home Living: Pt lives with  (works afternoons, weekends ), 1 story with 2 steps/rail   sleeps in recliner      mother and daughter live close by and able to assist    Bathroom setup: walk in shower, grab bars, shower chair     Equipment owned: wheelchair, walker, shower chair elevated commode with armrest  ** going to outpatient PT - d/t recent ankle surgery Oct 2022    Prior Level of Function: Independent  with ADLs , Independent  with IADLs; ambulated with no device       Pain Level: R LE pain ;   Cognition: A&O: 4/4;    Memory:  good    Sequencing:  good    Problem solving:  good    Judgement/safety:  fair+      Functional Assessment:  AM-PAC Daily Activity Raw Score: 16/24   Initial Eval Status  Date: 1/13/2023 Treatment Status  Date: STGs = LTGs  Time frame: 10-14 days   Feeding Independent      Grooming Set-up , seated   Decrease standing tolerance   Supervision    UB Dressing Set-up   Supervision    LB Dressing Max A   SBA    Bathing Mod A   SBA    Toileting Min A   Supervision    Bed Mobility  Mod A  Supine to sit  Supervision    Functional Transfers Min A  Sit- stand from bed,   Mod A  Sit- stand from commode   Supervision    Functional Mobility Min A,w/walker   Ambulated to bathroom, on return chair pulled up     Patient hesitant to put weight down into R LE - educated on [address]  with good tolerance    Balance Sitting:     Static:  Independent     Dynamic:SSBA   Standing: Min/CGA   Independent/supervision    Activity Tolerance No SOB on room air   O2 sats  >91%   Elevated HR  Good  with ADL activity    Visual/  Perceptual Glasses: yes                 Hand Dominance right   AROM (PROM) Strength Additional Info:    RUE  WFL WFL good  and wfl FMC/dexterity noted during ADL tasks       LUE WFL WFL good  and wfl FMC/dexterity noted during ADL tasks       Hearing: WFL   Sensation:  No c/o numbness or tingling   Tone: WFL  Edema: none observed     Comments: Upon arrival patient lying in bed . At end of session, patient sititng in chair  with call light and phone within reach, all lines and tubes intact. Overall patient demonstrated  decreased independence and safety during completion of ADL/functional transfer/mobility tasks. Pt would benefit from continued skilled OT to increase safety and independence with completion of ADL/IADL tasks for functional independence and quality of life. Comments/Treatment:      Patient practiced and was instructed on following during evaluation and OT session:          -Bed mobility - technique and safety         -Tranfers - hand placement, tech and safety         -Mobility - safety, and tech with  a device         -ADLs - tech, AE if appropriate/needed  - continue to educate and instruct          -Education:  breathing augustin, importance of OOB activity and participating in ADLs, safety awareness             Rehab Potential: good for established goals     Patient / Family Goal: return home       Patient and/or family were instructed on functional diagnosis, prognosis/goals and OT plan of care. Demonstrated good  understanding.      Eval Complexity: Low    Time In: 1450  Time Out: 1520  Total Treatment Time: 12    Min Units   OT Eval Low 97165 x  1   OT Eval Medium 23448      OT Eval High 58123      OT Re-Eval [id number]       Therapeutic Ex 78136      Therapeutic Activities 35117  12  1   ADL/Self Care 17053       Orthotic Management 92048       Manual [id number]     Neuro Re-Ed 58518       Non-Billable Time         Evaluation Time additionally includes thorough review of current medical information, gathering information on past medical history/social history and prior level of function, interpretation of standardized testing/informal observation of tasks, assessment of data and development of plan of care and goals.             Diane Cooper  OTR/L  OT [id number]

## 2023-01-13 NOTE — ANESTHESIA PRE PROCEDURE
Department of Anesthesiology  Preprocedure Note       Name:  Ja Harris   Age:  65 y.o.  :  1960                                          MRN:  [id number]         Date:  2023      Surgeon: Demetrio Delacruz):  Mesfin Yates DO    Procedure: Procedure(s):  RETROGRADE INTRAMEDULLARY NAILING RIGHT FEMUR  +++SYNTHES+++   +++LATEX ALLERGY+++   (STAFF TO OR 9)    Medications prior to admission:   Prior to Admission medications    Medication Sig Start Date End Date Taking? Authorizing Provider   L-methylfolate-B6-B12 Rhona Garcia) 2-78.815-0-47 MG CAPS capsule Take 1 capsule by mouth daily 22   Ghulam Rao DPM   Omega-3 Fatty Acids (FISH OIL) 1000 MG capsule Take by mouth daily    Historical Provider, MD       Current medications:    No current facility-administered medications for this visit. No current outpatient medications on file.      Facility-Administered Medications Ordered in Other Visits   Medication Dose Route Frequency Provider Last Rate Last Admin    sodium chloride flush 0.9 % injection 5-40 mL  5-40 mL IntraVENous 2 times per day Mesfin Yates,         sodium chloride flush 0.9 % injection 5-40 mL  5-40 mL IntraVENous PRN Mesfin Yates DO        0.9 % sodium chloride infusion   IntraVENous PRN Mesfin Yates DO        ceFAZolin (ANCEF) 2,000 mg in sterile water 20 mL IV syringe  2,000 mg IntraVENous See Admin Instructions Mesfin Yates DO        polyethylene glycol (GLYCOLAX) packet 17 g  17 g Oral Daily David Waggoner DO   17 g at 23 1757    sodium chloride flush 0.9 % injection 10 mL  10 mL IntraVENous 2 times per day ERAN Ruiz - CNP   10 mL at 23 2126    sodium chloride flush 0.9 % injection 10 mL  10 mL IntraVENous PRN ERAN Ruiz - CNP   10 mL at 23 0728    0.9 % sodium chloride infusion   IntraVENous PRN Hernan Soto APRN - CNP        potassium chloride (KLOR-CON M) extended release tablet 40 mEq  40 mEq Oral PRN Blinda Nova, APRN - CNP        Or    potassium bicarb-citric acid (EFFER-K) effervescent tablet 40 mEq  40 mEq Oral PRN Blinda Nova, APRN - CNP        Or    potassium chloride 10 mEq/100 mL IVPB (Peripheral Line)  10 mEq IntraVENous PRN Blinda Nova, APRN - CNP        enoxaparin (LOVENOX) injection 40 mg  40 mg SubCUTAneous Daily Blinda Nova, APRN - CNP        ondansetron (ZOFRAN-ODT) disintegrating tablet 4 mg  4 mg Oral Q8H PRN Blinda Nova, APRN - CNP        Or    ondansetron (ZOFRAN) injection 4 mg  4 mg IntraVENous Q6H PRN Blinda Nova, APRN - CNP        senna (SENOKOT) tablet 8.6 mg  1 tablet Oral Daily PRN Blinda Nova, APRN - CNP        acetaminophen (TYLENOL) tablet 650 mg  650 mg Oral Q6H PRN Blinda Nova, APRN - CNP        Or    acetaminophen (TYLENOL) suppository 650 mg  650 mg Rectal Q6H PRN Blinda Nova, APRN - CNP        HYDROmorphone (DILAUDID) injection 0.5 mg  0.5 mg IntraVENous Q3H PRN Behzad Greer MD   0.5 mg at 01/13/23 0609    oxyCODONE (ROXICODONE) immediate release tablet 5 mg  5 mg Oral Q4H PRN Behzad Greer MD           Allergies:     Allergies   Allergen Reactions    Latex      Leaves marks on skin    Aspirin Hives and Shortness Of Breath     sick  Other reaction(s): Disease type AND/OR category unknown (finding), GI Upset    Bee Venom Anaphylaxis     Other reaction(s): Disease type AND/OR category unknown (finding)    Codeine Hives and Shortness Of Breath     fatique  Other reaction(s): Disease type AND/OR category unknown (finding)    Penicillins Hives and Shortness Of Breath     swelling  Other reaction(s): Disease type AND/OR category unknown (finding), GI Upset  headaches      Soap Hives and Shortness Of Breath    Sulfa Antibiotics      Family history        Problem List:    Patient Active Problem List   Diagnosis Code    Hypothyroidism E03.9    Abnormal EKG R94.31    Floating ossicle of ankle R29.898    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    Spontaneous rupture of flexor tendon of right foot M66.371    Other fracture of right femur, initial encounter for closed fracture ([facility] [address] S72.8X1A    Fracture of femur, subcapital, closed, right, with malunion, subsequent encounter S72.011P       Past Medical History:        Diagnosis Date    Asthma     well controlled per patient    Right foot pain     For OR 10/14/22       Past Surgical History:        Procedure Laterality Date    ANKLE SURGERY Right 2021    REPAIR ANTERIOR TALAR FIBULAR LIGAMENT RIGHT FOOT REPAIR PERONEAL TENDON RIGHT FOOT, SURAL NERVE LYSIS  W PRP performed by Jae Hernandez DPM at [address] [place] [address] Right     Tendon repair    HYSTERECTOMY (CERVIX STATUS UNKNOWN)      KNEE SURGERY Bilateral     LEG SURGERY Right 10/14/2022    SURAL NERVE RESECTION RIGHT FOOT performed by Jae Hernandez DPM at [place] [address] Right        Social History:    Social History     Tobacco Use    Smoking status: Former     Types: Cigarettes     Quit date: 1996     Years since quittin.7    Smokeless tobacco: Never   Substance Use Topics    Alcohol use: Not Currently                                Counseling given: Not Answered      Vital Signs (Current): There were no vitals filed for this visit.                                            BP Readings from Last 3 Encounters:   23 128/78   10/14/22 (!) 151/84   10/04/22 136/88       NPO Status: > 8 hours         BMI:   Wt Readings from Last 3 Encounters:   01/10/23 187 lb (84.8 kg)   23 190 lb (86.2 kg)   22 190 lb (86.2 kg)     There is no height or weight on file to calculate BMI.    CBC:   Lab Results   Component Value Date/Time    WBC 7.7 2023 04:38 AM    RBC 4.39 2023 04:38 AM    HGB 12.8 2023 04:38 AM    HCT 39.0 2023 04:38 AM    MCV 88.8 2023 04:38 AM    RDW 13.1 2023 04:38 AM     2023 04:38 AM       CMP:   Lab Results Component Value Date/Time     01/12/2023 04:38 AM    K 4.3 01/12/2023 04:38 AM    CL 96 01/12/2023 04:38 AM    CO2 26 01/12/2023 04:38 AM    BUN 14 01/12/2023 04:38 AM    CREATININE 0.7 01/12/2023 04:38 AM    GFRAA >60 05/03/2022 04:43 PM    LABGLOM >60 01/12/2023 04:38 AM    GLUCOSE 105 01/12/2023 04:38 AM    PROT 6.9 01/12/2023 04:38 AM    CALCIUM 9.5 01/12/2023 04:38 AM    BILITOT 0.5 01/12/2023 04:38 AM    ALKPHOS 78 01/12/2023 04:38 AM    AST 19 01/12/2023 04:38 AM    ALT 18 01/12/2023 04:38 AM       POC Tests: No results for input(s): POCGLU, POCNA, POCK, POCCL, POCBUN, POCHEMO, POCHCT in the last 72 hours.     Coags:   Lab Results   Component Value Date/Time    PROTIME 10.5 01/11/2023 01:03 AM    INR 1.0 01/11/2023 01:03 AM       HCG (If Applicable): No results found for: PREGTESTUR, PREGSERUM, HCG, HCGQUANT     ABGs: No results found for: PHART, PO2ART, BJA9YJQ, QGP4KYM, BEART, Y5VDUTCY     Type & Screen (If Applicable):  No results found for: LABABO, LABRH    Drug/Infectious Status (If Applicable):  No results found for: HIV, HEPCAB    COVID-19 Screening (If Applicable):   Lab Results   Component Value Date/Time    COVID19 Not Detected 04/26/2021 06:06 AM           Anesthesia Evaluation  Patient summary reviewed no history of anesthetic complications:   Airway: Mallampati: III  TM distance: >3 FB   Neck ROM: full  Mouth opening: > = 3 FB   Dental: normal exam     Comment: Denies any cracked, loose, or chipped teeth    Pulmonary:normal exam  breath sounds clear to auscultation  (+) asthma:                           ROS comment: Does not use inhaler  Former smoker   Cardiovascular:Negative CV ROS            Rhythm: regular  Rate: normal           Beta Blocker:  Not on Beta Blocker         Neuro/Psych:   Negative Neuro/Psych ROS              GI/Hepatic/Renal: Neg GI/Hepatic/Renal ROS            Endo/Other:    (+) hypothyroidism::., .                  ROS comment: Not taking any meds for thyroid  Right Femur fracture Abdominal:             Vascular: negative vascular ROS. Other Findings:      ECG 1/11/2023:  Narrative & Impression    Sinus rhythm with premature supraventricular complexes  Left axis deviation  Incomplete right bundle branch block  Inferior infarct , age undetermined  Abnormal ECG  No previous ECGs available  Confirmed by Nathan Jameson ([id number]) on 1/11/2023 10:18:12 PM            Anesthesia Plan      general     ASA 3       Induction: intravenous. MIPS: Postoperative opioids intended and Prophylactic antiemetics administered. Anesthetic plan and risks discussed with patient. Plan discussed with CRNA and attending. Omega Rios RN   1/13/2023     NPO status confirmed. Anesthetic plan, risks, benefits, alternatives discussed with patient. Patient verbalized an understanding and agrees to proceed.      Omega Rios RN  6:28 AM

## 2023-01-13 NOTE — ANESTHESIA POSTPROCEDURE EVALUATION
Department of Anesthesiology  Postprocedure Note    Patient: Librado Griggs  MRN: [id number]  YOB: 1960  Date of evaluation: 1/13/2023      Procedure Summary     Date: 01/13/23 Room / Location: [facility] OR 03 / [facility]    Anesthesia Start: 7331 Anesthesia Stop: 5775    Procedure: RETROGRADE INTRAMEDULLARY NAILING RIGHT FEMUR  +++SYNTHES+++   +++LATEX ALLERGY+++   (STAFF TO OR 9) (Right) Diagnosis:       Closed fracture of right femur, unspecified fracture morphology, unspecified portion of femur, initial encounter ([facility])      (Closed fracture of right femur, unspecified fracture morphology, unspecified portion of femur, initial encounter ([facility] [place])    Surgeons: Nadeem Gutiérrez DO Responsible Provider: Jamal Randhawa MD    Anesthesia Type: general ASA Status: 3          Anesthesia Type: No value filed.     Jasen Phase I: Jasen Score: 10    Jasen Phase II:        Anesthesia Post Evaluation    Patient location during evaluation: PACU  Patient participation: complete - patient participated  Level of consciousness: awake and alert  Pain score: 0  Airway patency: patent  Nausea & Vomiting: no nausea and no vomiting  Complications: no  Cardiovascular status: blood pressure returned to baseline  Respiratory status: acceptable  Hydration status: euvolemic

## 2023-01-13 NOTE — PROGRESS NOTES
Patient seen and evaluated this morning. No change from previous. Continues with full neurovascular function of the right lower extremity. Pain currently well controlled. Her mother was present at the bedside. Surgery discussed again. Informed consent obtained and verified, correct operative lower extremity marked with a skin marker. Formal operative note to follow.

## 2023-01-14 LAB
ALBUMIN SERPL-MCNC: 4 G/DL (ref 3.5–5.2)
ALP BLD-CCNC: 67 U/L (ref 35–104)
ALT SERPL-CCNC: 14 U/L (ref 0–32)
ANION GAP SERPL CALCULATED.3IONS-SCNC: 12 MMOL/L (ref 7–16)
AST SERPL-CCNC: 19 U/L (ref 0–31)
BASOPHILS ABSOLUTE: 0.02 E9/L (ref 0–0.2)
BASOPHILS RELATIVE PERCENT: 0.2 % (ref 0–2)
BILIRUB SERPL-MCNC: 0.5 MG/DL (ref 0–1.2)
BUN BLDV-MCNC: 17 MG/DL (ref 6–23)
CALCIUM SERPL-MCNC: 9.2 MG/DL (ref 8.6–10.2)
CHLORIDE BLD-SCNC: 96 MMOL/L (ref 98–107)
CO2: 24 MMOL/L (ref 22–29)
CREAT SERPL-MCNC: 0.7 MG/DL (ref 0.5–1)
EOSINOPHILS ABSOLUTE: 0 E9/L (ref 0.05–0.5)
EOSINOPHILS RELATIVE PERCENT: 0 % (ref 0–6)
GFR SERPL CREATININE-BSD FRML MDRD: >60 ML/MIN/1.73
GLUCOSE BLD-MCNC: 137 MG/DL (ref 74–99)
HCT VFR BLD CALC: 30.8 % (ref 34–48)
HEMOGLOBIN: 10.4 G/DL (ref 11.5–15.5)
IMMATURE GRANULOCYTES #: 0.06 E9/L
IMMATURE GRANULOCYTES %: 0.6 % (ref 0–5)
LYMPHOCYTES ABSOLUTE: 1.04 E9/L (ref 1.5–4)
LYMPHOCYTES RELATIVE PERCENT: 9.6 % (ref 20–42)
MCH RBC QN AUTO: 30.1 PG (ref 26–35)
MCHC RBC AUTO-ENTMCNC: 33.8 % (ref 32–34.5)
MCV RBC AUTO: 89.3 FL (ref 80–99.9)
MONOCYTES ABSOLUTE: 1.14 E9/L (ref 0.1–0.95)
MONOCYTES RELATIVE PERCENT: 10.5 % (ref 2–12)
MRSA CULTURE ONLY: NORMAL
NEUTROPHILS ABSOLUTE: 8.63 E9/L (ref 1.8–7.3)
NEUTROPHILS RELATIVE PERCENT: 79.1 % (ref 43–80)
PDW BLD-RTO: 12.8 FL (ref 11.5–15)
PLATELET # BLD: 196 E9/L (ref 130–450)
PMV BLD AUTO: 11.1 FL (ref 7–12)
POTASSIUM REFLEX MAGNESIUM: 3.9 MMOL/L (ref 3.5–5)
RBC # BLD: 3.45 E12/L (ref 3.5–5.5)
SODIUM BLD-SCNC: 132 MMOL/L (ref 132–146)
TOTAL PROTEIN: 6.9 G/DL (ref 6.4–8.3)
WBC # BLD: 10.9 E9/L (ref 4.5–11.5)

## 2023-01-14 PROCEDURE — 97161 PT EVAL LOW COMPLEX 20 MIN: CPT

## 2023-01-14 PROCEDURE — 2580000003 HC RX 258: Performed by: GENERAL ACUTE CARE HOSPITAL

## 2023-01-14 PROCEDURE — 6360000002 HC RX W HCPCS: Performed by: GENERAL ACUTE CARE HOSPITAL

## 2023-01-14 PROCEDURE — 97535 SELF CARE MNGMENT TRAINING: CPT

## 2023-01-14 PROCEDURE — 36415 COLL VENOUS BLD VENIPUNCTURE: CPT

## 2023-01-14 PROCEDURE — 80053 COMPREHEN METABOLIC PANEL: CPT

## 2023-01-14 PROCEDURE — 1200000000 HC SEMI PRIVATE

## 2023-01-14 PROCEDURE — 2580000003 HC RX 258: Performed by: NURSE PRACTITIONER

## 2023-01-14 PROCEDURE — 85025 COMPLETE CBC W/AUTO DIFF WBC: CPT

## 2023-01-14 PROCEDURE — 97530 THERAPEUTIC ACTIVITIES: CPT

## 2023-01-14 PROCEDURE — 6370000000 HC RX 637 (ALT 250 FOR IP): Performed by: NURSE PRACTITIONER

## 2023-01-14 RX ORDER — ENOXAPARIN SODIUM 100 MG/ML
40 INJECTION SUBCUTANEOUS DAILY
Qty: 11.2 ML | Refills: 0 | Status: SHIPPED | OUTPATIENT
Start: 2023-01-14 | End: 2023-02-11

## 2023-01-14 RX ORDER — POLYETHYLENE GLYCOL 3350 17 G/17G
17 POWDER, FOR SOLUTION ORAL DAILY
Qty: 527 G | Refills: 1 | Status: SHIPPED | OUTPATIENT
Start: 2023-01-15 | End: 2023-02-14

## 2023-01-14 RX ORDER — SENNA PLUS 8.6 MG/1
1 TABLET ORAL DAILY PRN
Qty: 30 TABLET | Refills: 0 | Status: SHIPPED | OUTPATIENT
Start: 2023-01-14 | End: 2023-02-13

## 2023-01-14 RX ORDER — OXYCODONE HYDROCHLORIDE 5 MG/1
5 TABLET ORAL EVERY 4 HOURS PRN
Qty: 30 TABLET | Refills: 0 | Status: SHIPPED | OUTPATIENT
Start: 2023-01-14 | End: 2023-01-21

## 2023-01-14 RX ADMIN — ENOXAPARIN SODIUM 40 MG: 100 INJECTION SUBCUTANEOUS at 08:51

## 2023-01-14 RX ADMIN — WATER 2000 MG: 1 INJECTION INTRAMUSCULAR; INTRAVENOUS; SUBCUTANEOUS at 00:52

## 2023-01-14 RX ADMIN — SODIUM CHLORIDE, PRESERVATIVE FREE 10 ML: 5 INJECTION INTRAVENOUS at 00:52

## 2023-01-14 RX ADMIN — Medication 10 ML: at 21:00

## 2023-01-14 RX ADMIN — Medication 10 ML: at 08:52

## 2023-01-14 RX ADMIN — ACETAMINOPHEN 650 MG: 325 TABLET ORAL at 19:57

## 2023-01-14 ASSESSMENT — PAIN DESCRIPTION - LOCATION: LOCATION: LEG

## 2023-01-14 ASSESSMENT — PAIN SCALES - GENERAL
PAINLEVEL_OUTOF10: 3
PAINLEVEL_OUTOF10: 1

## 2023-01-14 ASSESSMENT — PAIN DESCRIPTION - ORIENTATION: ORIENTATION: RIGHT

## 2023-01-14 ASSESSMENT — PAIN DESCRIPTION - DESCRIPTORS: DESCRIPTORS: ACHING;DISCOMFORT

## 2023-01-14 ASSESSMENT — PAIN - FUNCTIONAL ASSESSMENT: PAIN_FUNCTIONAL_ASSESSMENT: PREVENTS OR INTERFERES SOME ACTIVE ACTIVITIES AND ADLS

## 2023-01-14 NOTE — PROGRESS NOTES
Department of Orthopedic Surgery  Trauma / Fracture Care   Attending Progress Note        Subjective:  No complaints. Patient resting comfortably. She did mobilize with therapy, engaging in touchdown weightbearing. She complains of typical postoperative pain about the right lower extremity. No acute issues overnight. Denies fevers, chills, nausea or vomiting. Pain is currently well controlled on medication. Vitals  VITALS:  /61   Pulse 95   Temp 98.1 °F (36.7 °C) (Oral)   Resp 18   Ht 5' 4\" (1.626 m)   Wt 187 lb (84.8 kg)   SpO2 92%   BMI 32.10 kg/m²     PHYSICAL EXAM:    Orientation:  alert and oriented to person, place and time    Right Lower Extremity    Compartments: soft      Incision:  dressing in place, clean, dry, intact, and there is a small amount of clear bloody drainage from the medial distal incision. No active drainage at this time.     Upper extremity Motor: 5/5 axillary, mc,m,r,u,ain,pin    Upper extremity sensory: grossly in tact    Lower Extremity Motor :   Quadriceps:  5/5  Extensor hallucis:  5/5  Dorsiflexion:  5/5  Plantarflexion:  5/5    Lower Extremity Sensory:  Tibial Nerve:  intact  Superficial Peroneal Nerve:  intact  Deep Peroneal Nerve:  intact    Pulses:    Posterior Tibial:  2  Dorsalis Pedis:  2  Posterior Tibial Artery:  2    Abnormal Exam findings:  none        LABS:    HgB:    Lab Results   Component Value Date    HGB 10.0 8/28/2011     INR:    No components found with this basename: PTPATIENT, PTINR     CBC:   Lab Results   Component Value Date/Time    WBC 7.7 01/12/2023 04:38 AM    RBC 4.39 01/12/2023 04:38 AM    HGB 12.8 01/12/2023 04:38 AM    HCT 39.0 01/12/2023 04:38 AM    MCV 88.8 01/12/2023 04:38 AM    MCH 29.2 01/12/2023 04:38 AM    MCHC 32.8 01/12/2023 04:38 AM    RDW 13.1 01/12/2023 04:38 AM     01/12/2023 04:38 AM    MPV 11.0 01/12/2023 04:38 AM       ASSESSMENT AND PLAN:    Post operative day 1 status post right distal femur ORIF    1: Weight bearing as tolerated  2:  Deep venous thrombosis prophylaxis -Lovenox 40 daily  3:  Continue physical therapy  4:  D/C Plan:  Home Health  5:  F/U Plan              6: Doing well today. Hemoglobin is stable. Discharge instructions and pain medication prescription/Lovenox prescription left on chart. We reviewed her surgery today. She may progress weightbearing as tolerated. She has been somewhat hesitant to engage and weightbearing as tolerated may progress at her own pace. I did encourage her to mobilize the right knee to prevent stiffness. Discussed changing the distal medial incision/dressing with nursing prior to discharge. Stable from an orthopedic standpoint. She verbalized a desire to want to return home with home health. She will need to see me in the office in 2 weeks to make an appointment for staple removal, dressing change and new x-rays.     Robby Raygoza DO  1/14/2023  12:40 PM

## 2023-01-14 NOTE — DISCHARGE INSTRUCTIONS
Maintain dressings about the right lower extremity.  You may change these dressings if there is any concern with excessive bleeding  Keep incisions covered for at least 1 week  After 1 week, you may remove the dressings and leave staples open to air if there is no drainage  Okay to shower with dressings in place, as they are waterproof  After 1 week, may shower with staples exposed.  Avoid tub soaks  Take medications as directed  Continue weightbearing as tolerated with wheeled walker for balance  Call the office with questions or concerns  Make an appointment in 2 weeks to see surgeon for staple removal and new x-rays

## 2023-01-14 NOTE — PROGRESS NOTES
Initial Evaluation      Attending Provider:  Alex Paige MD    Evaluating PT:  Gama Khanhuey PT, RRT, CEAS    Room #:  9727/5406-N  Diagnosis:  S/P fall on steps with closed fracture of right distal femur  Pertinent PMHx/PSHx:  See PMH, was in therapy RT ankle Fx  Procedure/Surgery:  RETROGRADE INTRAMEDULLARY NAILING RIGHT FEMUR 1/13/23  Precautions:  Weight-bear as tolerated on the operative right lower extremity and mobilize her right knee immediately. Equipment Needs:  [employer]    SUBJECTIVE:    Pt lives with [facility] in a Bilevel [employer] home with 3 stairs and 1 rail to enter. Pt ambulated with no AD PTA. OBJECTIVE:   Initial Evaluation  Date: 1/14/23 Treatment Short Term/ Long Term   Goals   Was pt agreeable to Eval/treatment? Yes     Does pt have pain? RT knee. Reports no pain meds on board and does not want to take any. Bed Mobility  Mod assist + required 2/2 severe pain RT knee   S/Indep all levels   Transfers Sit to stand: S/SBA  Stand to sit: S/SBA  Stand pivot: NA  Indep/S all surfaces   Ambulation   20 feet with [employer] S/SBA-she wanted to self-limit distance 2/2 pain   50 feet with [employer] Indep/S   Stair negotiation: ascended and descended  NA-not ready 2/2 severe pain, limited stand fidencio   4 steps with 1 rail SBA/S   ROM RT knee -30* to 65* active and AA -25* to 75*  RT knee -20* to 80*   MMT NA/severe pain/limited efforts  RT knee 3-/5   AM-PAC 6 Clicks 97/77       Pt is A & O x 3   Sensation:  Pt denies numbness and tingling to extremities  Edema:  Post operative RTLE   Balance: sitting:indep and standing: requiring [employer] @ SBA/S   Endurance: NA/limited walk fidencio 2/2 severe RT knee pain    Patient education  Pt educated on WBAT, transfers,     Patient response to education:   Pt verbalized understanding Pt demonstrated skill Pt requires further education in this area   Y Y Review PRN     ASSESSMENT:    Comments: In chair on arrival stating high pain but has not and does not want to take the pain medication. Positioning of the RTLE on floor @ 60* flexion with ROM limited 2/2 severe pain. Educated on wt bearing status and green light to mobilize knee as fidencio. Transfer to stand was S/SBA with good safety skills. No dizziness expressed during/post all positional changes. Amb with [employer] with micro step sequencing and reports unable to imp stride/step lengths. No drifting, lateral veering, buckle or LOB with WW use. Wanted to return to chair @ 10' walked 2/2 reports severe pain; stair negotiation was NA 2/2 limited stand fidencio. Bed mobility a high challenge getting RTLE into/out of bed from sit EOB. She attempts usage of her LTLE hook RT ankle technique w/o success 2/2 severe pain; extended time taken to attempt transfer into/out of bed. Return to bedside chair @ bed mobility trial.  Call light and wall phone on bed and easily reachable. Very appreciative for care. Treatment:  Patient practiced and was instructed in the following treatment:    Eval  Amb  Education  Bed mob    Pt's/ family goals   1. Home    Patient and or family understand(s) diagnosis, prognosis, and plan of care. PLAN:    PT care will be provided in accordance with the objectives noted above. Exercises and functional mobility practice will be used as well as appropriate assistive devices or modalities to obtain goals. Patient and family education will also be administered as needed. Frequency of treatments: Daily       Total Treatment Time  30 minutes     Evaluation Time includes thorough review of current medical information, gathering information on past medical history/social history and prior level of function, completion of standardized testing/informal observation of tasks, assessment of data and education on plan of care and goals.     CPT codes:  [x] Low Complexity PT evaluation 65947  [] Moderate Complexity PT evaluation 51416  [] High Complexity PT evaluation 99819  [] PT Re-evaluation 79816  [x] Gait training 25654 ** minutes  [] Manual therapy 26359 ** minutes  [x] Therapeutic activities 16550 ** minutes  [] Therapeutic exercises 53347 ** minutes  [] Neuromuscular reeducation 00613 ** minutes    Maximilian Riddle PT, RRT, CEAS

## 2023-01-14 NOTE — PROGRESS NOTES
Internal Medicine Progress Note    Patient's name: Leny Benitez  : 1960  Chief complaints (on day of admission): Fall Luetta Party down a few steps while at home. No LOC. Right lateral distal thigh deformity noted. Patient did not hit head.)  Admission date: 1/10/2023  Date of service: 2023   Room: [address]  Primary care physician: Lucero Sierra DO  Reason for visit: Follow-up for hip fracture    Subjective  Roshan Fearing was seen at bedside today, doing well, eating drinking moving bowels pain controlled and no new complaints at this time     Review of Systems  There are no new complaints of chest pain, shortness of breath, abdominal pain, nausea, vomiting, diarrhea.     Hospital Medications  Current Facility-Administered Medications   Medication Dose Route Frequency Provider Last Rate Last Admin    sodium chloride flush 0.9 % injection 5-40 mL  5-40 mL IntraVENous PRN [place] Rasp Berdis, DO        0.9 % sodium chloride infusion   IntraVENous PRN [place] Rasp Berdis, DO        ondansetron (ZOFRAN-ODT) disintegrating tablet 4 mg  4 mg Oral Q8H PRN [place] Rasp Berdis, DO        Or    ondansetron (ZOFRAN) injection 4 mg  4 mg IntraVENous Q6H PRN [place] Rasp Berdis, DO        oxyCODONE (ROXICODONE) immediate release tablet 5 mg  5 mg Oral Q4H PRN [place] Rasp Berdis, DO        Or    oxyCODONE (ROXICODONE) immediate release tablet 10 mg  10 mg Oral Q4H PRN [place] Rasp Berdis, DO        morphine (PF) injection 2 mg  2 mg IntraVENous Q2H PRN [place] Rasp Berdis, DO        Or    morphine sulfate (PF) injection 4 mg  4 mg IntraVENous Q2H PRN [place] Rasp Berdis, DO        polyethylene glycol (GLYCOLAX) packet 17 g  17 g Oral Daily [place] Rasp Berdis, DO        enoxaparin (LOVENOX) injection 40 mg  40 mg SubCUTAneous Daily [place] Rasp Berdis, DO   40 mg at 23 0851    sodium chloride flush 0.9 % injection 10 mL  10 mL IntraVENous 2 times per day [place] Heart, APRN - CNP   10 mL at 23 0852    sodium chloride flush 0.9 % injection 10 mL  10 mL IntraVENous PRN [place], APRN - CNP   10 mL at 01/14/23 0052    0.9 % sodium chloride infusion   IntraVENous PRN Mark Ligas, APRN - CNP        potassium chloride (KLOR-CON M) extended release tablet 40 mEq  40 mEq Oral PRN [place] Lig[place], APRN - CNP        Or    potassium bicarb-citric acid (EFFER-K) effervescent tablet 40 mEq  40 mEq Oral PRN [place] Ligas, APRN - CNP        Or    potassium chloride 10 mEq/100 mL IVPB (Peripheral Line)  10 mEq IntraVENous PRN [place] Ligas, APRN - CNP        ondansetron (ZOFRAN-ODT) disintegrating tablet 4 mg  4 mg Oral Q8H PRN [place] Ligas, APRN - CNP        Or    ondansetron (ZOFRAN) injection 4 mg  4 mg IntraVENous Q6H PRN [place] Ligas, APRN - CNP        senna (SENOKOT) tablet 8.6 mg  1 tablet Oral Daily PRN [place] Ligas, APRN - CNP        acetaminophen (TYLENOL) tablet 650 mg  650 mg Oral Q6H PRN [place] Ligas, APRN - CNP        Or    acetaminophen (TYLENOL) suppository 650 mg  650 mg Rectal Q6H PRN [place] Ligas, APRN - CNP           PRN Medications  sodium chloride flush, sodium chloride, ondansetron **OR** ondansetron, oxyCODONE **OR** oxyCODONE, morphine **OR** morphine, sodium chloride flush, sodium chloride, potassium chloride **OR** potassium alternative oral replacement **OR** potassium chloride, ondansetron **OR** ondansetron, senna, acetaminophen **OR** acetaminophen    Objective  Most Recent Recorded Vitals  /61   Pulse 95   Temp 98.1 °F (36.7 °C) (Oral)   Resp 18   Ht 5' 4\" (1.626 m)   Wt 187 lb (84.8 kg)   SpO2 92%   BMI 32.10 kg/m²   I/O last 3 completed shifts: In: 1550 [I.V.:1550]  Out: 48 [Blood:50]  No intake/output data recorded.     Physical Exam:   General: AAO to person/place/time/purpose, NAD, no labored breathing  Eyes: conjunctivae/corneas clear, sclera non icteric  Skin: color/texture/turgor normal, no rashes or lesions  Lungs: CTAB, no retractions/use of accessory muscles, no vocal fremitus, no rhonchi, no crackle, no rales  Heart: regular rate, regular rhythm, no murmur  Abdomen: Mildly distended but soft, NT, bowel sounds normal  Extremities: Wrap noted to the right lower extremity  Neurologic: cranial nerves 2-12 grossly intact, no slurred speech    Most Recent Labs  Lab Results   Component Value Date    WBC 7.7 01/12/2023    HGB 12.8 01/12/2023    HCT 39.0 01/12/2023     01/12/2023     01/12/2023    K 4.3 01/12/2023    CL 96 (L) 01/12/2023    CREATININE 0.7 01/12/2023    BUN 14 01/12/2023    CO2 26 01/12/2023    GLUCOSE 105 (H) 01/12/2023    ALT 18 01/12/2023    AST 19 01/12/2023    INR 1.0 01/11/2023    TSH 3.080 05/03/2022    LABA1C 5.4 05/03/2022    LABMICR <12.0 05/03/2022       FLUORO FOR SURGICAL PROCEDURES   Final Result   Intraprocedural fluoroscopic spot images as above. See separate procedure   report for more information. XR KNEE RIGHT (1-2 VIEWS)   Final Result   Transverse fracture distal femoral metadiaphysis with mild angulation and   foreshortening. No dislocation of the knee joint. RECOMMENDATION:   Careful clinical correlation and follow up recommended. XR FEMUR RIGHT (MIN 2 VIEWS)   Final Result   Transverse oblique fracture distal femoral metadiaphysis with mild apex   dorsal angulation and foreshortening. RECOMMENDATION:   Careful clinical correlation and follow up recommended. XR CHEST PORTABLE   Final Result   No acute disease. RECOMMENDATION:   Careful clinical correlation and follow up recommended.            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 Discharge plan:  Refusing KIA, DC once ok with ortho       Electronically signed by Lenin Alexander MD on 1/14/2023 at 3:41 PM    I can be reached through [address] [place] [address].

## 2023-01-14 NOTE — PROGRESS NOTES
Occupational Therapy    OCCUPATIONAL THERAPY treatment note  [address] [facility]'[place], [place]         [id number]                                                  Patient Name: Paul Toledo    MRN: [id number]    : 1960    Room: [address] [place] [address]    Per eval:    Evaluating OT: Thelma Russell OTR/L   [id number]  Recommended Adaptive Equipment: declined AE , BSC      Referring Provider:Peña Yates DO    Specific Provider Orders/Date:OT eval and treat 2023      Diagnosis:  Other fracture of right femur, initial encounter for closed fracture ([facility] [address] [S72.8X1A]  Fracture of femur, subcapital, closed, right, with malunion, subsequent encounter [S72.011P]    SProcedure(s):  RETROGRADE INTRAMEDULLARY NAILING RIGHT FEMUR   2023     Pertinent Medical History: asthma,  R foot/ankle  surgery 10/2022     Precautions:  Fall Risk, WBAT R LE     Assessment of current deficits    [x] Functional mobility  [x]ADLs  [x] Strength               []Cognition    [x] Functional transfers   [x] IADLs         [x] Safety Awareness   [x]Endurance    [] Fine Coordination              [x] Balance      [] Vision/perception   []Sensation     []Gross Motor Coordination  [] ROM  [] Delirium                   [] Motor Control     OT PLAN OF CARE   OT POC based on physician orders, patient diagnosis and results of clinical assessment    Frequency/Duration  2-4 days/wk for 2 weeks PRN   Specific OT Treatment Interventions to include:   ADL retraining/adapted techniques and AE recommendations to increase functional independence within precautions                    Energy conservation techniques to improve tolerance for selfcare routine   Functional transfer/mobility training/DME recommendations for increased independence, safety and fall prevention         Patient/family education to increase safety and functional independence             Environmental modifications for safe mobility and completion of ADLs                             Therapeutic activity to improve functional performance during ADLs. Therapeutic exercise to improve tolerance and functional strength for ADLs    Balance retraining/tolerance tasks for facilitation of postural control with dynamic challenges during ADLs .       Positioning to improve functional independence  []    Home Living: Pt lives with  (works afternoons, weekends ), 1 story with 2 steps/rail   sleeps in recliner      mother and daughter live close by and able to assist    Bathroom setup: walk in shower, grab bars, shower chair     Equipment owned: wheelchair, walker, shower chair elevated commode with armrest  ** going to outpatient PT - d/t recent ankle surgery Oct 2022    Prior Level of Function: Independent  with ADLs , Independent  with IADLs; ambulated with no device       Pain Level: R LE pain ;   Cognition: A&O: 4/4;    Memory:  good    Sequencing:  good    Problem solving:  good    Judgement/safety:  fair+      Functional Assessment:  AM-PAC Daily Activity Raw Score: 17/24   Initial Eval Status  Date: 1/13/2023 Treatment Status  Date:1/14 STGs = LTGs  Time frame: 10-14 days   Feeding Independent      Grooming Set-up , seated   Decrease standing tolerance  SBA (standing at sink) Supervision    UB Dressing Set-up  Set-up Supervision    LB Dressing Max A  Mod A (assist with brief, declined AE for LB dressing and reported family will assist at home) SBA    Bathing Mod A  Mod A (simulated, pt declined) SBA    Toileting Min A  Min A Supervision    Bed Mobility  Mod A  Supine to sit SBA Supervision    Functional Transfers Min A  Sit- stand from bed,   Mod A  Sit- stand from commode  SBA Supervision    Functional Mobility Min A,w/walker   Ambulated to bathroom, on return chair pulled up     Patient hesitant to put weight down into R LE - educated on WBAT  SBA (using ww, to/from bathroom) Supervision with good tolerance    Balance Sitting:     Static:  Independent     Dynamic:SSBA   Standing: Min/CGA  SBA Independent/supervision    Activity Tolerance No SOB on room air   O2 sats  >91%   Elevated HR fair Good  with ADL activity    Visual/  Perceptual Glasses: yes                 Hand Dominance right   AROM (PROM) Strength Additional Info:    RUE  WFL WFL good  and wfl FMC/dexterity noted during ADL tasks       LUE WFL WFL good  and wfl FMC/dexterity noted during ADL tasks       Hearing: WFL   Sensation:  No c/o numbness or tingling   Tone: WFL  Edema: none observed     Comments: Upon arrival patient lying in bed and agreeable to OT session . At end of session, patient sititng in chair  with call light and phone within reach, all lines and tubes intact. Comments/Treatment: Cleared by RN to see pt. Pt required vc's and physical assist for proper technique/safety with hand placement/body mechanics/posture for bed mobility/ADLs/functional tranfers/mobility/ww management. Pt required vc's for sequencing/initiation of ADLs/functional transfers. Pt educated on kandis dressing technique. Educated on hot to get in/out of car for discharge and technique to improve RLE gait. Pt required rest breaks during session. Pt appeared to have tolerated session well and appears cooperative/pleasant . Pt instructed on use of call light for assistance and fall prevention. Pt demo'ing fair understanding of education provided.  Continue to educate.         -pt has made fair progress toward goals  -continue current POC     Eval Complexity: Low    Time In: 0805  Time Out: 0820  Total Treatment Time: 15    Min Units   OT Eval Low 77311     OT Eval Medium 49599     OT Eval High 39284     OT Re-Eval [id number]     Therapeutic Ex [id number]     Therapeutic Activities 98917     ADL/Self Care 19298  15 1    Orthotic Management 60363       Manual 15058     Neuro Re-Ed 63887       Non-Billable Time         Bettyjo Soulier, MOT, OTR/L [id number]

## 2023-01-15 VITALS
WEIGHT: 187 LBS | HEART RATE: 112 BPM | OXYGEN SATURATION: 100 % | SYSTOLIC BLOOD PRESSURE: 123 MMHG | HEIGHT: 64 IN | RESPIRATION RATE: 16 BRPM | BODY MASS INDEX: 31.92 KG/M2 | DIASTOLIC BLOOD PRESSURE: 70 MMHG | TEMPERATURE: 98.3 F

## 2023-01-15 PROCEDURE — 97116 GAIT TRAINING THERAPY: CPT

## 2023-01-15 PROCEDURE — 6370000000 HC RX 637 (ALT 250 FOR IP): Performed by: NURSE PRACTITIONER

## 2023-01-15 PROCEDURE — 6360000002 HC RX W HCPCS: Performed by: GENERAL ACUTE CARE HOSPITAL

## 2023-01-15 PROCEDURE — 6370000000 HC RX 637 (ALT 250 FOR IP): Performed by: GENERAL ACUTE CARE HOSPITAL

## 2023-01-15 PROCEDURE — 97110 THERAPEUTIC EXERCISES: CPT

## 2023-01-15 PROCEDURE — 97530 THERAPEUTIC ACTIVITIES: CPT

## 2023-01-15 PROCEDURE — 2700000000 HC OXYGEN THERAPY PER DAY

## 2023-01-15 RX ADMIN — POLYETHYLENE GLYCOL 3350 17 G: 17 POWDER, FOR SOLUTION ORAL at 09:29

## 2023-01-15 RX ADMIN — ACETAMINOPHEN 650 MG: 325 TABLET ORAL at 12:17

## 2023-01-15 RX ADMIN — ENOXAPARIN SODIUM 40 MG: 100 INJECTION SUBCUTANEOUS at 09:30

## 2023-01-15 RX ADMIN — ACETAMINOPHEN 650 MG: 325 TABLET ORAL at 09:30

## 2023-01-15 ASSESSMENT — PAIN DESCRIPTION - LOCATION: LOCATION: LEG

## 2023-01-15 ASSESSMENT — PAIN DESCRIPTION - DESCRIPTORS: DESCRIPTORS: SHOOTING;STABBING;SHARP

## 2023-01-15 ASSESSMENT — PAIN SCALES - GENERAL
PAINLEVEL_OUTOF10: 3
PAINLEVEL_OUTOF10: 8

## 2023-01-15 ASSESSMENT — PAIN DESCRIPTION - ORIENTATION: ORIENTATION: RIGHT

## 2023-01-15 NOTE — PROGRESS NOTES
Pt voicing that her sugar was 61 on her continuous glucometer. Pt drank juice and had lunch, pt was 98 at re-check. Attending made aware.

## 2023-01-15 NOTE — PROGRESS NOTES
Daily Treat      Evaluating PT:  Maximilian Riddle PT, RRT, CEAS     Room #:  2790/4337-G  Diagnosis:  S/P fall on steps with closed fracture of right distal femur  Pertinent PMHx/PSHx:  See PMH, was in therapy RT ankle Fx  Procedure/Surgery:  RETROGRADE INTRAMEDULLARY NAILING RIGHT FEMUR 1/13/23  Precautions:  Weight-bear as tolerated on the operative right lower extremity and mobilize her right knee immediately. Equipment Needs:  [employer]     SUBJECTIVE:     Pt lives with Hsb in a Bilevel [place] home with 3 stairs and 1 rail to enter. Pt ambulated with no AD PTA. OBJECTIVE:    Initial Evaluation  Date: 1/14/23 Treatment  1/15/23 AM Short Term/ Long Term   Goals   Was pt agreeable to Eval/treatment? Yes Yes      Does pt have pain? RT knee. Reports no pain meds on board and does not want to take any. RT knee better slightly      Bed Mobility  Mod assist + required 2/2 severe pain RT knee  Sup-sit EOB S/SBA-extra time to complete task  S/Indep all levels   Transfers Sit to stand: S/SBA  Stand to sit: S/SBA  Stand pivot: NA  Bed-stand: S/Indep   Stand-sit chair: Indep/S Indep/S all surfaces   Ambulation   20 feet with [employer] S/SBA-she wanted to self-limit distance 2/2 pain   50' [employer] S/Indep. See comments  50 feet with [employer] Indep/S   Stair negotiation: ascended and descended  NA-not ready 2/2 severe pain, limited stand fidencio   NA-stated this AM she has ramp at home and a WC to use to get in home. Was not revealed initial eval. 4 steps with 1 rail SBA/S   ROM RT knee -30* to 65* active and AA -25* to 75*  RT knee AROM -40* to 80* with AA/P -20*-90* and 95* post manual/there-ex RT knee -20* to 80*   MMT NA/severe pain/limited efforts  NA/severe pain.  Able to partially ext knee 2/2 pain RT knee 3-/5   AM-PAC 6 Clicks 28/16 24/08       Therapeutic Exercises:  Heel raises, AA QS for knee ext ROM x 5, sitting knee flex slides with active assist for flex x 5 @ 5 sec holds,     Patient education  Pt educated on ROM ex, NO posterior knee roll and use ankle roll for knee ext, Gait cycle     Patient response to education:   Pt verbalized understanding Pt demonstrated skill Pt requires further education in this area   y Y Review PRN, possible D/C today home     ASSESSMENT:    Comments: In bed on arrival in no distress. Receptive for getting OOB in chair. Home today and not happy as wants to stay another day as Hsb fixing up toilet in bathroom and feels not ready to return home. Revealed has ramp entrance in home with a WC available; was not revealed initial eval. Steps therefore NA as well as predict a high difficulty will surface if steps needed to be performed 2/2 severe pain, ROM impairments and limited unilateral standing fidencio RTLE. Transfer to EOB was indep/S with extended time to perform task 2/2 pain. Transfer to stand was S/Indep w/o LOB. Some delayed and staggered mvmt behaviors with transfer to stand. No dizziness expressed during/post all positional changes. Amb with [employer] with a slow paced non-reciprocal step to gait cycle. Loading RTLE as fidencio with limited mid stance load tolerances/antalgias. Was able to tolerate 50' walk this AM vs 20' prior day. ROM initial pre-there-x @ -40* to 80* with gain to -20*-95*. Highly engaged and motivated but pain preventing more aggressive ROM ex. Ultra high pain to light palpation RT knee globally preventing hands on manual ROM. Improved edema RTLE vs prior day. Remained in chair @ Tx with call light and wall phone on bed. Very appreciative for care. Treatment:  Patient practiced and was instructed in the following treatment:    Bed mob  Transfers  Amb  There-ex  Education    PLAN:    Pt is making + progress toward established Physical Therapy goals. Continue with physical therapy current plan of care.     Total Treatment Time  35 minutes     Evaluation Time includes thorough review of current medical information, gathering information on past medical history/social history and prior level of function, completion of standardized testing/informal observation of tasks, assessment of data and education on plan of care and goals.     CPT codes:  [] Low Complexity PT evaluation 72550  [] Moderate Complexity PT evaluation 87232  [] High Complexity PT evaluation 63327  [] PT Re-evaluation 17274  [x] Gait training 36522 ** minutes  [] Manual therapy 18926 ** minutes  [x] Therapeutic activities 67633 ** minutes  [x] Therapeutic exercises 74635 ** minutes  [] Neuromuscular reeducation 79971 ** minutes       Tammy Ferraro PT, RRT, CEAS

## 2023-01-15 NOTE — PROGRESS NOTES
Education Documentation  Home Health Care Services, taught by Lilli Laboy RN at 1/15/2023  9:54 AM.  Learner: Patient  Readiness: Acceptance  Method: Explanation  Response: Verbalizes Understanding    Follow-up Appointments, taught by Lilli Laboy RN at 1/15/2023  9:54 AM.  Learner: Patient  Readiness: Acceptance  Method: Explanation  Response: Verbalizes Understanding    When to Call the Doctor, taught by Lilli Laboy RN at 1/15/2023  9:54 AM.  Learner: Patient  Readiness: Acceptance  Method: Explanation  Response: Verbalizes Understanding    Review Discharge Plan, taught by Lilli Laboy RN at 1/15/2023  9:54 AM.  Learner: Patient  Readiness: Acceptance  Method: Explanation  Response: Verbalizes Understanding    General Self Care, taught by Lilli Laboy RN at 1/15/2023  9:54 AM.  Learner: Patient  Readiness: Acceptance  Method: Explanation  Response: Verbalizes Understanding    Tests, taught by Lilli Laboy RN at 1/15/2023  9:54 AM.  Learner: Patient  Readiness: Acceptance  Method: Explanation  Response: Verbalizes Understanding    General medication information, taught by Lilli Laboy RN at 1/15/2023  9:54 AM.  Learner: Patient  Readiness: Acceptance  Method: Explanation  Response: Verbalizes Understanding    Activity, taught by Lilli Laboy RN at 1/15/2023  9:54 AM.  Learner: Patient  Readiness: Acceptance  Method: Explanation  Response: Verbalizes Understanding    Diet Instruction, taught by Lilli Laboy RN at 1/15/2023  9:54 AM.  Learner: Patient  Readiness: Acceptance  Method: Explanation  Response: Verbalizes Understanding    Medical Equipment, taught by Lilli Laboy RN at 1/15/2023  9:54 AM.  Learner: Patient  Readiness: Acceptance  Method: Explanation  Response: Verbalizes Understanding    Pressure Stockings, taught by Lilli Laboy RN at 1/15/2023  9:54 AM.  Learner: Patient  Readiness: Acceptance  Method: Explanation  Response: Verbalizes Understanding    Sequential  Compression Device, taught by Demetria Mckeon RN at 1/15/2023  9:54 AM.  Learner: Patient  Readiness: Acceptance  Method: Explanation  Response: Verbalizes Understanding    Anticoagulant Therapy Diet, taught by Demetria Mckeon RN at 1/15/2023  9:54 AM.  Learner: Patient  Readiness: Acceptance  Method: Explanation  Response: Verbalizes Understanding    ANTICOAGULANT THERAPY IP - Med, taught by Demetria Mckeon RN at 1/15/2023  9:54 AM.  Learner: Patient  Readiness: Acceptance  Method: Explanation  Response: Verbalizes Understanding    Non-Pharmacological Comfort Measures, taught by Demetria Mckeon RN at 1/15/2023  9:54 AM.  Learner: Patient  Readiness: Acceptance  Method: Explanation  Response: Verbalizes Understanding    Pain Medication Actions & Side Effects, taught by Demetria Mckeon RN at 1/15/2023  9:54 AM.  Learner: Patient  Readiness: Acceptance  Method: Explanation  Response: Verbalizes Understanding    Pain Control, taught by Demetria Mckeon RN at 1/15/2023  9:54 AM.  Learner: Patient  Readiness: Acceptance  Method: Explanation  Response: Verbalizes Understanding    Pain Rating Scale, taught by Demetria Mckeon RN at 1/15/2023  9:54 AM.  Learner: Patient  Readiness: Acceptance  Method: Explanation  Response: Verbalizes Understanding    INSTRUCT  ON USE OF SAFETY DEVICES, taught by Demetria Mckeon RN at 1/15/2023  9:54 AM.  Learner: Patient  Readiness: Acceptance  Method: Explanation  Response: Verbalizes Understanding    Medication Safety, taught by Demetria Mckeon RN at 1/15/2023  9:54 AM.  Learner: Patient  Readiness: Acceptance  Method: Explanation  Response: Verbalizes Understanding    Fall Prevention, taught by Demetria Mckeon RN at 1/15/2023  9:54 AM.  Learner: Patient  Readiness: Acceptance  Method: Explanation  Response: Verbalizes Understanding    Morphine Sulfate, taught by Demetria Mckeon RN at 1/15/2023  9:53 AM.  Learner: Patient  Readiness: Acceptance  Method: Explanation  Response: Kalen Pagan Understanding    Meperidine HCl, taught by Reggie Sebastian RN at 1/15/2023  9:53 AM.  Learner: Patient  Readiness: Acceptance  Method: Explanation  Response: Verbalizes Understanding    Polyethylene Glycol 3350, taught by Reggie Sebastian RN at 1/15/2023  9:53 AM.  Learner: Patient  Readiness: Acceptance  Method: Explanation  Response: Verbalizes Understanding    oxyCODONE HCl, taught by Reggie Sebastian RN at 1/15/2023  9:53 AM.  Learner: Patient  Readiness: Acceptance  Method: Explanation  Response: Verbalizes Understanding    Ondansetron HCl, taught by Reggie Sebastian RN at 1/15/2023  9:53 AM.  Learner: Patient  Readiness: Acceptance  Method: Explanation  Response: Verbalizes Understanding    Potassium Bicarb-Citric Acid, taught by Reggie Sebastian RN at 1/15/2023  9:53 AM.  Learner: Patient  Readiness: Acceptance  Method: Explanation  Response: Verbalizes Understanding    Enoxaparin Sodium, taught by Reggie Sebastian RN at 1/15/2023  9:53 AM.  Learner: Patient  Readiness: Acceptance  Method: Explanation  Response: Verbalizes Understanding    Potassium Chloride [facility], taught by Reggie Sebastian RN at 1/15/2023  9:53 AM.  Learner: Patient  Readiness: Acceptance  Method: Explanation  Response: Verbalizes Understanding    Potassium Chloride, taught by Reggie Sebastian RN at 1/15/2023  9:53 AM.  Learner: Patient  Readiness: Acceptance  Method: Explanation  Response: Verbalizes Understanding    Acetaminophen, taught by Reggie Sebastian RN at 1/15/2023  9:53 AM.  Learner: Patient  Readiness: Acceptance  Method: Explanation  Response: Verbalizes Understanding    Ondansetron, taught by Reggie Sebastian RN at 1/15/2023  9:53 AM.  Learner: Patient  Readiness: Acceptance  Method: Explanation  Response: Verbalizes Understanding    Sennosides, taught by Reggie Sebastian RN at 1/15/2023  9:53 AM.  Learner: Patient  Readiness: Acceptance  Method: Explanation  Response: Verbalizes Understanding    Sodium Chloride Flush, taught by Lilli Laboy RN at 1/15/2023  9:53 AM.  Learner: Patient  Readiness: Acceptance  Method: Explanation  Response: Verbalizes Understanding    ceFAZolin Sodium, taught by Lilli Laboy RN at 1/15/2023  9:53 AM.  Learner: Patient  Readiness: Acceptance  Method: Explanation  Response: Verbalizes Understanding    HYDROcodone-Acetaminophen, taught by Lilli Laboy RN at 1/15/2023  9:53 AM.  Learner: Patient  Readiness: Acceptance  Method: Explanation  Response: Verbalizes Understanding    HYDROmorphone HCl, taught by Lilli Laboy RN at 1/15/2023  9:53 AM.  Learner: Patient  Readiness: Acceptance  Method: Explanation  Response: Verbalizes Understanding    Vancomycin HCl, taught by Lilli Laboy RN at 1/15/2023  9:53 AM.  Learner: Patient  Readiness: Acceptance  Method: Explanation  Response: Verbalizes Understanding    Sodium Chloride, taught by Lilli Laboy RN at 1/15/2023  9:53 AM.  Learner: Patient  Readiness: Acceptance  Method: Explanation  Response: Verbalizes Understanding    Enoxaparin Sodium, taught by Lilli Laboy RN at 1/15/2023  9:52 AM.  Learner: Patient  Readiness: Acceptance  Method: Explanation  Response: Verbalizes Understanding    Education Comments  No comments found.    Discharge instructions given to patient. Medication administration, follow up appointments and special instructions discussed. Patient thankful for the care received. Discharge pending family arrival.

## 2023-01-15 NOTE — PLAN OF CARE
Problem: Discharge Planning  Goal: Discharge to home or other facility with appropriate resources  1/15/2023 1011 by Uzair Oviedo  Outcome: Progressing  1/15/2023 0057 by Melissa Brooks RN  Outcome: Progressing     Problem: Pain  Goal: Verbalizes/displays adequate comfort level or baseline comfort level  1/15/2023 1011 by Uzair Oviedo  Outcome: Progressing  1/15/2023 0057 by Melissa Brooks RN  Outcome: Progressing     Problem: Safety - Adult  Goal: Free from fall injury  1/15/2023 1011 by Uzair Oviedo  Outcome: Progressing  1/15/2023 0057 by Melissa Brooks RN  Outcome: Progressing     Problem: ABCDS Injury Assessment  Goal: Absence of physical injury  1/15/2023 1011 by Uzair Oviedo  Outcome: Progressing  1/15/2023 0057 by Melissa Brooks RN  Outcome: Progressing     Problem: Skin/Tissue Integrity  Goal: Absence of new skin breakdown  Description: 1. Monitor for areas of redness and/or skin breakdown  2. Assess vascular access sites hourly  3. Every 4-6 hours minimum:  Change oxygen saturation probe site  4. Every 4-6 hours:  If on nasal continuous positive airway pressure, respiratory therapy assess nares and determine need for appliance change or resting period.   1/15/2023 1011 by Uzair Oviedo  Outcome: Progressing  1/15/2023 0057 by Melissa Brooks RN  Outcome: Progressing

## 2023-01-15 NOTE — PROGRESS NOTES
Internal Medicine Progress Note    Patient's name: Patrice Rojo  : 1960  Chief complaints (on day of admission): Fall Marvie Bullion down a few steps while at home. No LOC. Right lateral distal thigh deformity noted. Patient did not hit head.)  Admission date: 1/10/2023  Date of service: 1/15/2023   Room: [address]  Primary care physician: Case Koenig DO  Reason for visit: Follow-up for hip fracture    Aniyah Alvarado was seen at bedside today    States she is in pain today, refusing to take anything more than Tylenol, she states this is because her dad had a bad reaction to a pain medicine once in the past, tried to explain the role of opiates after surgery and using a very small dose to help with her pain in an effort to minimize possible adverse effects, she did not want to try. She is also refusing KIA placement adamantly, states she wants to go home. She is cleared from a medical POV for DC today by medical team and orthopedic surgery, however, she states she does not want to leave today. Review of Systems  There are no new complaints of chest pain, shortness of breath, abdominal pain, nausea, vomiting, diarrhea.     Hospital Medications  Current Facility-Administered Medications   Medication Dose Route Frequency Provider Last Rate Last Admin    sodium chloride flush 0.9 % injection 5-40 mL  5-40 mL IntraVENous PRN Ernestine Dinning Berdis, DO        0.9 % sodium chloride infusion   IntraVENous PRN Ernestine Dinning Berdis, DO        ondansetron (ZOFRAN-ODT) disintegrating tablet 4 mg  4 mg Oral Q8H PRN Ernestine Dinning Berdis, DO        Or    ondansetron (ZOFRAN) injection 4 mg  4 mg IntraVENous Q6H PRN Ernestine Dinning Berdis, DO        oxyCODONE (ROXICODONE) immediate release tablet 5 mg  5 mg Oral Q4H PRN Ernestine Dinning Berdis, DO        Or    oxyCODONE (ROXICODONE) immediate release tablet 10 mg  10 mg Oral Q4H PRN Ernestine Dinning Berdis, DO        morphine (PF) injection 2 mg  2 mg IntraVENous Q2H PRN Ernestine Dinning Berdis, DO        Or    morphine sulfate (PF) injection 4 mg  4 mg IntraVENous Q2H PRN Martha Blades Berdis, DO        polyethylene glycol (GLYCOLAX) packet 17 g  17 g Oral Daily Martha Blades Berdis, DO   17 g at 01/15/23 0929    enoxaparin (LOVENOX) injection 40 mg  40 mg SubCUTAneous Daily Martha Blades Berdis, DO   40 mg at 01/15/23 0930    sodium chloride flush 0.9 % injection 10 mL  10 mL IntraVENous 2 times per day Ming Luis, APRN - CNP   10 mL at 01/14/23 2100    sodium chloride flush 0.9 % injection 10 mL  10 mL IntraVENous PRN Ming Luis, APRN - CNP   10 mL at 01/14/23 0052    0.9 % sodium chloride infusion   IntraVENous PRN Ming Luis, APRN - CNP        potassium chloride (KLOR-CON M) extended release tablet 40 mEq  40 mEq Oral PRN Ming Luis, APRN - CNP        Or    potassium bicarb-citric acid (EFFER-K) effervescent tablet 40 mEq  40 mEq Oral PRN Ming Luis, APRN - CNP        Or    potassium chloride 10 mEq/100 mL IVPB (Peripheral Line)  10 mEq IntraVENous PRN Ming Luis, APRN - CNP        senna (SENOKOT) tablet 8.6 mg  1 tablet Oral Daily PRN Ming Luis, APRN - CNP        acetaminophen (TYLENOL) tablet 650 mg  650 mg Oral Q6H PRN Ming Luis, APRN - CNP   650 mg at 01/15/23 0930    Or    acetaminophen (TYLENOL) suppository 650 mg  650 mg Rectal Q6H PRN Ming Luis, APRN - CNP           PRN Medications  sodium chloride flush, sodium chloride, ondansetron **OR** ondansetron, oxyCODONE **OR** oxyCODONE, morphine **OR** morphine, sodium chloride flush, sodium chloride, potassium chloride **OR** potassium alternative oral replacement **OR** potassium chloride, senna, acetaminophen **OR** acetaminophen    Objective  Most Recent Recorded Vitals  /70   Pulse (!) 112   Temp 98.3 °F (36.8 °C) (Oral)   Resp 16   Ht 5' 4\" (1.626 m)   Wt 187 lb (84.8 kg)   SpO2 100%   BMI 32.10 kg/m²   No intake/output data recorded. No intake/output data recorded.     Physical Exam: General: AAO to person/place/time/purpose, NAD, no labored breathing  Eyes: conjunctivae/corneas clear, sclera non icteric  Skin: color/texture/turgor normal, no rashes or lesions  Lungs: CTAB, no retractions/use of accessory muscles, no vocal fremitus, no rhonchi, no crackle, no rales  Heart: regular rate, regular rhythm, no murmur  Abdomen: Mildly distended but soft, NT, bowel sounds normal  Extremities: Wrap noted to the right lower extremity  Neurologic: cranial nerves 2-12 grossly intact, no slurred speech    Most Recent Labs  Lab Results   Component Value Date    WBC 10.9 01/14/2023    HGB 10.4 (L) 01/14/2023    HCT 30.8 (L) 01/14/2023     01/14/2023     01/14/2023    K 3.9 01/14/2023    CL 96 (L) 01/14/2023    CREATININE 0.7 01/14/2023    BUN 17 01/14/2023    CO2 24 01/14/2023    GLUCOSE 137 (H) 01/14/2023    ALT 14 01/14/2023    AST 19 01/14/2023    INR 1.0 01/11/2023    TSH 3.080 05/03/2022    LABA1C 5.4 05/03/2022    LABMICR <12.0 05/03/2022       FLUORO FOR SURGICAL PROCEDURES   Final Result   Intraprocedural fluoroscopic spot images as above. See separate procedure   report for more information. XR KNEE RIGHT (1-2 VIEWS)   Final Result   Transverse fracture distal femoral metadiaphysis with mild angulation and   foreshortening. No dislocation of the knee joint. RECOMMENDATION:   Careful clinical correlation and follow up recommended. XR FEMUR RIGHT (MIN 2 VIEWS)   Final Result   Transverse oblique fracture distal femoral metadiaphysis with mild apex   dorsal angulation and foreshortening. RECOMMENDATION:   Careful clinical correlation and follow up recommended. XR CHEST PORTABLE   Final Result   No acute disease. RECOMMENDATION:   Careful clinical correlation and follow up recommended.            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 Only wants Tylenol for now. PT OT  noted   Refusing KIA - recommended this today, adamantly refuses  DVT prophylaxis   Cleared for DC per ortho     Constipation:  Initiate bowel regimen      [facility] : 18/24 now updated   Follow labs   Please see orders for further management and care. Discharge plan:  Refusing KIA, she is stable and cleared for DC today     Electronically signed by Ailyn Santamaria MD on 1/15/2023 at 10:18 AM    I can be reached through [facility].

## 2023-01-15 NOTE — PROGRESS NOTES
Daily Treat      Evaluating PT:  [place] PT, RRT, CEAS     Room #:  6342/5673-H  Diagnosis:  S/P fall on steps with closed fracture of right distal femur  Pertinent PMHx/PSHx:  See PMH, was in therapy RT ankle Fx  Procedure/Surgery:  RETROGRADE INTRAMEDULLARY NAILING RIGHT FEMUR 1/13/23  Precautions:  Weight-bear as tolerated on the operative right lower extremity and mobilize her right knee immediately. Equipment Needs:  [employer]     SUBJECTIVE:     [facility] with 3 stairs and 1 rail to enter. Pt ambulated with no AD PTA. OBJECTIVE:    Initial Evaluation  Date: 1/14/23 Treatment  1/15/23 PM Short Term/ Long Term   Goals   Was pt agreeable to Eval/treatment? Yes Yes      Does pt have pain? RT knee. Reports no pain meds on board and does not want to take any. RT knee 6/10      Bed Mobility  Mod assist + required 2/2 severe pain RT knee   Sit-supine: SBA/S  S/Indep all levels   Transfers Sit to stand: S/SBA  Stand to sit: S/SBA  Stand pivot: NA   Chair-stand: indep/S. Supervision just as a precaution. Indep/S all surfaces   Ambulation   20 feet with WW S/SBA-she wanted to self-limit distance 2/2 pain   100' [employer] S/Indep. See comments  50 feet with [employer] Indep/S   Stair negotiation: ascended and descended  NA-not ready 2/2 severe pain, limited stand fidencio   NA-stated this AM she has ramp at home and a WC to use to get in home. Was not revealed initial eval. 4 steps with 1 rail SBA/S   ROM RT knee -30* to 65* active and AA -25* to 75*  RT knee as AROM -30* to 85* with AA/P -20*-90* and 95* post manual/there-ex RT knee -20* to 80*   MMT NA/severe pain/limited efforts  NA/severe pain.  Able to partially ext knee 2/2 pain RT knee 3-/5   AM-PAC 6 Clicks 02/92  89/56       Therapeutic Exercises:  Heel raises, AA QS for knee ext ROM x 5, sitting knee flex slides with active assist for flex x 5 @ 5 sec holds,     Patient education  Pt educated on ROM ex, NO posterior knee roll and use ankle roll for knee ext, Gait cycle     Patient response to education:   Pt verbalized understanding Pt demonstrated skill Pt requires further education in this area   y Y Review PRN, possible D/C today home     ASSESSMENT:    Comments PM: Remained in chair from AM session requesting getting back into bed. Agreed to participate in BID session before she gets back into bed. Reports pain as AM session, better than prior day but still high. Wants to stay another day to control the pain and give Hsb extra time getting ready for her return. Ramped entrance with WC there to help get into home. Fully aware and understands ROM there-ex and heel roll for knee extension. Reviewed HEP for ROM as AM session and has good understanding. Transfer to stand from chair was indep/S with good safety skills. No delays or staggered mvmt performances with transition into standing. Good RTLE closed chain load tolerances with transfer and no buckle in the knee. Amb with [employer] with a similar gait cycle from AM session. Non-reciprocal step to gait cycle with + mid stance closed chain antalgias/limited load and time tolerance. Pace slower self-adjusted but safe. No drifting, lateral veering, buckle or LOB with WW use. Indep [employer] guidances. Gait distances improved to 100' PM session; increasing pain preventing distances > 100'. ROM for there-ex pre was -35* to 85* which is a slight imp from AM session; reports has been working on ROM all AM as inst from AM session. Gain ROM to -20* to 95* post manual/there-ex. Increased pain to all light touch global RT knee/distal femur/prox tibial.  + pitting edema 3+ RT LE. Sensation to light touch RTLE +. Remained in chair post there-ex to eat lunch. Call light and wall phone on bed and easily reachable. Very appreciative for weekend coverage PT. Comments AM:  In bed on arrival in no distress. Receptive for getting OOB in chair.   Home today and not happy as wants to stay another day as Hsb fixing up toilet in bathroom and feels not ready to return home. Revealed has ramp entrance in home with a WC available; was not revealed initial eval. Steps therefore NA as well as predict a high difficulty will surface if steps needed to be performed 2/2 severe pain, ROM impairments and limited unilateral standing fidencio RTLE.  Transfer to EOB was indep/S with extended time to perform task 2/2 pain. Transfer to stand was S/Indep w/o LOB.  Some delayed and staggered mvmt behaviors with transfer to stand.  No dizziness expressed during/post all positional changes.  Amb with WW with a slow paced non-reciprocal step to gait cycle.  Loading RTLE as fidencio with limited mid stance load tolerances/antalgias.  Was able to tolerate 50' walk this AM vs 20' prior day.  ROM initial pre-there-x @ -40* to 80* with gain to -20*-95*.  Highly engaged and motivated but pain preventing more aggressive ROM ex.  Ultra high pain to light palpation RT knee globally preventing hands on manual ROM.  Improved edema RTLE vs prior day.  Remained in chair @ Tx with call light and wall phone on bed.  Very appreciative for care.      Treatment:  Patient practiced and was instructed in the following treatment:    Bed mob  Transfers  Amb  There-ex  Education    PLAN:    Pt is making + progress toward established Physical Therapy goals.  Continue with physical therapy current plan of care.    Total Treatment Time  35 minutes     Evaluation Time includes thorough review of current medical information, gathering information on past medical history/social history and prior level of function, completion of standardized testing/informal observation of tasks, assessment of data and education on plan of care and goals.    CPT codes:  [] Low Complexity PT evaluation 87013  [] Moderate Complexity PT evaluation 38958  [] High Complexity PT evaluation 08176  [] PT Re-evaluation 24846  [x] Gait training 94526 ** minutes  [] Manual therapy 23227 ** minutes  [x]  Therapeutic activities 06194 ** minutes  [x] Therapeutic exercises 88519 ** minutes  [] Neuromuscular reeducation 04001 ** minutes       Wilhelminia Estimable PT, RRT, CEAS

## 2023-01-16 NOTE — DISCHARGE SUMMARY
Internal Medicine Discharge Summary    NAME: Barb Momin :  1960  MRN:  [id number] PCP:Bruno Godinez DO    ADMITTED: 1/10/2023   DISCHARGED: 1/15/2023  3:38 PM    ADMITTING PHYSICIAN: Rody att. providers found    PCP: Lise Godinez DO    CONSULTANT(S):   IP CONSULT TO ORTHOPEDIC SURGERY  IP CONSULT TO INTERNAL MEDICINE  IP CONSULT TO SOCIAL WORK  IP CONSULT TO HOME CARE NEEDS     ADMITTING DIAGNOSIS:   Other fracture of right femur, initial encounter for closed fracture ([facility] [S72.8X1A]  Fracture of femur, subcapital, closed, right, with malunion, subsequent encounter [S72.011P]     Please see H&P for further details    DISCHARGE DIAGNOSES:   Active Hospital Problems    Diagnosis     Other fracture of right femur, initial encounter for closed fracture ([facility] [S72.8X1A]      Priority: Medium    Fracture of femur, subcapital, closed, right, with malunion, subsequent encounter [S72.011P]      Priority: Medium    Floating ossicle of ankle [R29.898]     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 female patient of Bernadine De La Cruz DO who  has a past medical history of Asthma and Right foot pain. who originally had concerns including Fall (Sarina Call down a few steps while at home. No LOC. Right lateral distal thigh deformity noted. Patient did not hit head. ). at presentation on 1/10/2023, and was found to have Other fracture of right femur, initial encounter for closed fracture ([facility] [S72.8X1A]  Fracture of femur, subcapital, closed, right, with malunion, subsequent encounter [S72.011P] after workup. Please see H&P for further details. HOSPITAL COURSE:   The patient presented to the hospital with the chief complaint of Fall Mexia Brash down a few steps while at home. No LOC. Right lateral distal thigh deformity noted. Patient did not hit head.)  .  The patient was admitted to the hospital. Transverse fracture of the right distal femoral metadiaphysis  Orthopedic surgery consulted sp operative fixation on 1/13  IV or oral pain medication as needed, she does not want to take these, encouraged her to take some pain meds so that she is not in so much pain. Only wants Tylenol for now. PT OT  noted   Refusing KIA - recommended this today, adamantly refuses  DVT prophylaxis   Cleared for DC per ortho      Constipation:  Initiate bowel regimen        [facility] : 18/24 now updated   Follow labs   Please see orders for further management and care. Discharge plan:  Refusing KIA, she is stable and cleared for DC today       BRIEF PHYSICAL EXAMINATION AND LABORATORIES ON DAY OF DISCHARGE:  VITALS:  /70   Pulse (!) 112   Temp 98.3 °F (36.8 °C) (Oral)   Resp 16   Ht 5' 4\" (1.626 m)   Wt 187 lb (84.8 kg)   SpO2 100%   BMI 32.10 kg/m²       Please see note from the same day. LABS[de-identified]  Recent Labs     01/14/23  1236      K 3.9   CL 96*   CO2 24   BUN 17   CREATININE 0.7   GLUCOSE 137*   CALCIUM 9.2     Recent Labs     01/14/23  1236   ALKPHOS 67   ALT 14   AST 19   PROT 6.9   BILITOT 0.5   LABALBU 4.0     Recent Labs     01/14/23  1236   WBC 10.9   RBC 3.45*   HGB 10.4*   HCT 30.8*   MCV 89.3   MCH 30.1   MCHC 33.8   RDW 12.8      MPV 11.1     Lab Results   Component Value Date    LABA1C 5.4 05/03/2022    LABA1C 5.5 04/15/2021     Lab Results   Component Value Date    INR 1.0 01/11/2023    PROTIME 10.5 01/11/2023      Lab Results   Component Value Date    TSH 3.080 05/03/2022    TSH 2.600 04/15/2021     Lab Results   Component Value Date    TRIG 139 05/03/2022    TRIG 98 04/15/2021    HDL 79 05/03/2022    HDL 87 04/15/2021    LDLCALC 191 (H) 05/03/2022    LDLCALC 189 (H) 04/15/2021     No results for input(s): MG in the last 72 hours. No results for input(s): CKTOTAL, CKMB, TROPONINI in the last 72 hours. No results for input(s): LACTA in the last 72 hours.     IMAGING:  XR FEMUR RIGHT (MIN 2 VIEWS)    Result Date: 1/11/2023  EXAMINATION: 2 XRAY VIEWS OF THE RIGHT FEMUR 1/11/2023 12:14 am COMPARISON: None. HISTORY: ORDERING SYSTEM PROVIDED HISTORY: fall, ro fx TECHNOLOGIST PROVIDED HISTORY: Reason for exam:->fall, ro fx FINDINGS: Transverse oblique fracture distal femoral metadiaphysis with mild apex dorsal angulation and foreshortening.  The proximal femur and hip are intact. No soft tissue gas or foreign body.     Transverse oblique fracture distal femoral metadiaphysis with mild apex dorsal angulation and foreshortening. RECOMMENDATION: Careful clinical correlation and follow up recommended.     XR KNEE RIGHT (1-2 VIEWS)    Result Date: 1/11/2023  EXAMINATION: TWO XRAY VIEWS OF THE RIGHT KNEE 1/11/2023 12:14 am COMPARISON: None. HISTORY: ORDERING SYSTEM PROVIDED HISTORY: Concern for distal femur fx vs dislocation TECHNOLOGIST PROVIDED HISTORY: Reason for exam:->Concern for distal femur fx vs dislocation FINDINGS: Transverse fracture distal femoral metadiaphysis with mild angulation and foreshortening.  Knee joint intact.  No knee joint effusion.  Soft tissues unremarkable.     Transverse fracture distal femoral metadiaphysis with mild angulation and foreshortening.  No dislocation of the knee joint. RECOMMENDATION: Careful clinical correlation and follow up recommended.     XR CHEST PORTABLE    Result Date: 1/11/2023  EXAMINATION: ONE XRAY VIEW OF THE CHEST 1/11/2023 12:14 am COMPARISON: None. HISTORY: ORDERING SYSTEM PROVIDED HISTORY: fall TECHNOLOGIST PROVIDED HISTORY: Reason for exam:->fall FINDINGS: Normal cardiomediastinal silhouette.  Lungs clear.  No pneumothorax or effusion.  Osseous thorax intact.     No acute disease. RECOMMENDATION: Careful clinical correlation and follow up recommended.     FLUORO FOR SURGICAL PROCEDURES    Result Date: 1/13/2023  EXAMINATION: SPOT FLUOROSCOPIC IMAGES 1/13/2023 10:24 am TECHNIQUE: Fluoroscopy was provided by the radiology department for  procedure. Radiologist was not present during examination. FLUOROSCOPY DOSE AND TYPE OR TIME AND EXPOSURES: Fluoroscopy time equals 353.4 seconds. Total dose equals 73.02 mGy COMPARISON: None HISTORY: ORDERING SYSTEM PROVIDED HISTORY: rt femur TECHNOLOGIST PROVIDED HISTORY: Reason for exam:->rt femur Intraprocedural imaging. FINDINGS: 7 spot images of the femur were obtained. Intraprocedural fluoroscopic spot images as above. See separate procedure report for more information. MICROBIOLOGY:  BLOOD CX #1  No results for input(s): BC in the last 72 hours. BLOOD CX #2  No results for input(s): James Lav in the last 72 hours. TIP CULTURE  No results for input(s): CXCATHTIP in the last 72 hours. CULTURE, RESPIRATORY   No results for input(s): CULTRESP in the last 72 hours. RESPIRATORY SMEAR  No results for input(s): RESPSMEAR in the last 72 hours. ECHO:      DISPOSITION:  The patient's condition is good. The patient is being discharged to home    DISCHARGE MEDICATIONS:      Medication List        START taking these medications      enoxaparin 40 MG/0.4ML  Commonly known as: LOVENOX  Inject 0.4 mLs into the skin daily for 28 days     oxyCODONE 5 MG immediate release tablet  Commonly known as: ROXICODONE  Take 1 tablet by mouth every 4 hours as needed for Pain for up to 7 days.  Max Daily Amount: 30 mg     polyethylene glycol 17 g packet  Commonly known as: GLYCOLAX  Take 17 g by mouth daily     senna 8.6 MG tablet  Commonly known as: SENOKOT  Take 1 tablet by mouth daily as needed (Constipation)            CONTINUE taking these medications      fish oil 1000 MG capsule     L-methylfolate-B6-B12 3-90.314-2-35 MG Caps capsule  Take 1 capsule by mouth daily               Where to Get Your Medications        These medications were sent to [address] [phone] Jenny [address] [facility] [address] Phone: [phone]   polyethylene glycol 17 g packet  senna 8.6 MG tablet       You can get these medications from any pharmacy    Bring a paper prescription for each of these medications  enoxaparin 40 MG/0.4ML  oxyCODONE 5 MG immediate release tablet         Discharge Medication List as of 1/15/2023 12:39 PM        Discharge Medication List as of 1/15/2023 12:39 PM        Discharge Medication List as of 1/15/2023 12:39 PM        START taking these medications    Details   oxyCODONE (ROXICODONE) 5 MG immediate release tablet Take 1 tablet by mouth every 4 hours as needed for Pain for up to 7 days. Max Daily Amount: 30 mg, Disp-30 tablet, R-0Print      enoxaparin (LOVENOX) 40 MG/0.4ML Inject 0.4 mLs into the skin daily for 28 days, Disp-11.2 mL, R-0Print      polyethylene glycol (GLYCOLAX) 17 g packet Take 17 g by mouth daily, Disp-527 g, R-1Normal      senna (SENOKOT) 8.6 MG tablet Take 1 tablet by mouth daily as needed (Constipation), Disp-30 tablet, R-0Normal             INTERNAL MEDICINE FOLLOW UP/INSTRUCTIONS:  Follow-up with primary care physician within 1 week of discharge from hospital  Please review changes to pre-hospital admission medications and prescriptions for new medications upon discharge from the hospital with PCP  Please review results of labs and imaging studies with PCP  Follow-up with consultants as directed by them   If recurrence or worsening of symptoms please call primary care physician or return to the ER immediately  Diet: No diet orders on file    Preparing for this patient's discharge, including paperwork, orders, instructions, and meeting with patient did required >35 minutes.     Electronically signed by Ruiz Solano MD on 1/15/2023 at 10:15 PM

## 2023-01-17 NOTE — ADT AUTH CERT
Musculoskeletal Disease GRG - Care Day 4 (1/14/2023) by Jose Adan RN       Review Status Review Entered   Completed 1/17/2023 1119       Created By   Jose Adan RN      Criteria Review      Care Day: 4 Care Date: 1/14/2023 Level of Care: Intermediate Care    Guideline Day 3    Level Of Care    ( ) * Activity level acceptable    1/17/2023 11:19 AM EST by Michael English      ambulation-20 feet with [address] S/SBA-she wanted to self-limit distance 2/2 pain   bed mobility-Mod assist + required 2/2 severe pain RT knee    ( ) * Complete discharge planning    Clinical Status    (X) * Temperature status acceptable    1/17/2023 11:19 AM EST by Sushil Baires      afebrile    (X) * No infection, or status acceptable    1/17/2023 11:19 AM EST by Sushil Fix      dressing in place, clean, dry, intact, and there is a small amount of clear bloody drainage from the medial distal incision. No active drainage at this time. ( ) * C-reactive protein stable, declining, or not indicated    (X) * Respiratory status acceptable    1/17/2023 11:19 AM EST by Michael English      RR 18    (X) * Neurologic status acceptable    1/17/2023 11:19 AM EST by Lorradha Baires      alert and oriented to person, place and time    (X) * Vascular, soft tissue, and wound status acceptable    (X) * Pain and nausea absent or adequately managed    1/17/2023 11:19 AM EST by Lorandreinaa Fix      Patient Reports no pain meds on board and does not want to take any.     ( ) * Fracture or injury absent or status acceptable    ( ) * Rheumatologic and vasculitic conditions absent or acceptable for next level of care    ( ) * General Discharge Criteria met    Interventions    ( ) * Intake acceptable    ( ) * No inpatient interventions needed    1/17/2023 11:19 AM EST by Brice Bearden Ancef 2000mg Q8hrs    * Milestone   Additional Notes   DATE: 1/14-Care day 4-retro review competed on 1/17            PERTINENT UPDATES:    She did mobilize with therapy, engaging in touchdown weightbearing. She complains of typical postoperative pain about the right lower extremity. VITALS:   98.1-95-/61-92% RA      ABNL/PERTINENT LABS/RADIOLOGY/DIAGNOSTIC STUDIES:   1/14/23 12:36   Chloride: 96 (L)   Est, Glom Filt Rate: >60   Glucose, Random: 137 (H)   RBC: 3.45 (L)   Hemoglobin Quant: 10.4 (L)   Hematocrit: 30.8 (L)   Lymphocyte %: 9.6 (L)   Neutrophils Absolute: 8.63 (H)   Lymphocytes Absolute: 1.04 (L)   Monocytes Absolute: 1.14 (H)   Eosinophils Absolute: 0.00 (L)      PHYSICAL EXAM:   Orientation:  alert and oriented to person, place and time   Right Lower Extremity   Compartments: soft   Incision:  dressing in place, clean, dry, intact, and there is a small amount of clear bloody drainage from the medial distal incision. No active drainage at this time. MD CONSULTS/ASSESSMENT AND PLAN:   Ortho   ASSESSMENT AND PLAN:   Post operative day 1 status post right distal femur ORIF   1:  Weight bearing as tolerated   2:  Deep venous thrombosis prophylaxis -Lovenox 40 daily   3:  Continue physical therapy   4:  D/C Plan:  Home Health   5:  F/U Plan               6: Doing well today. Hemoglobin is stable. Discharge instructions and pain medication prescription/Lovenox prescription left on chart. We reviewed her surgery today. She may progress weightbearing as tolerated. She has been somewhat hesitant to engage and weightbearing as tolerated may progress at her own pace. I did encourage her to mobilize the right knee to prevent stiffness. Discussed changing the distal medial incision/dressing with nursing prior to discharge. Stable from an orthopedic standpoint. She verbalized a desire to want to return home with home health.   She will need to see me in the office in 2 weeks to make an appointment for staple removal, dressing change and new x-rays.   +++++++++++++   Intermed:   Plan   Transverse fracture of the right distal femoral metadiaphysis   · Orthopedic surgery consulted sp operative fixation on 1/13   · IV pain medication as needed   · PT OT  noted    · Refusing KIA    · DVT prophylaxis    Constipation:   · Initiate bowel regimen       · Follow labs    · Please see orders for further management and care. · Discharge plan:  Refusing KIA, DC once ok with ortho           MEDICATIONS:   IV Ancef 2000mg Q8hrs x1 dose then dc'd   SQ Lovenox 40mg QD   PO PRN Tylenol 650mg x1      ORDERS:   I&O      PT/OT/SLP/CM ASSESSMENT OR NOTES:   PT   Bed Mobility Mod assist + required 2/2 severe pain RT knee   Transfers Sit to stand: S/SBA   Stand to sit: S/SBA   Stand pivot: NA   Ambulation   20 feet with [employer] S/SBA-she wanted to self-limit distance 2/2 pain    Stair negotiation: ascended and descended NA-not ready 2/2 severe pain, limited stand fidencio    ROM RT knee -30* to 65* active and AA -25* to 75*      OT   Comments/Treatment: Cleared by RN to see pt. Pt required vc's and physical assist for proper technique/safety with hand placement/body mechanics/posture for bed mobility/ADLs/functional tranfers/mobility/ww management. Pt required vc's for sequencing/initiation of ADLs/functional transfers. Pt educated on kandis dressing technique. Educated on hot to get in/out of car for discharge and technique to improve RLE gait. Pt required rest breaks during session.   Pt appeared to have tolerated session well and appears cooperative/pleasant                     Musculoskeletal Disease GRG - Care Day 3 (1/13/2023) by Lianna Camejo RN       Review Status Review Entered   Completed 1/17/2023 1104       Created By   Lianna Camejo RN      Criteria Review      Care Day: 3 Care Date: 1/13/2023 Level of Care: Intermediate Care    Guideline Day 2    Clinical Status    (X) * No ICU or intermediate care needs    Interventions    (X) Inpatient interventions continue    1/17/2023 11:04 AM EST by Sugar English      IV Ancef 2000mg Q8hrs  PRN IV Dilaudid 0.56mg x3  PT/OT evals    * Milestone   Additional Notes   DATE: 1/13-Care Day 3-retro reviewed on 1/17         PERTINENT UPDATES:   Underwent RETROGRADE INTRAMEDULLARY NAILING RIGHT FEMUR        VITALS:   99-93-/71-93% O2 4L      PHYSICAL EXAM:   Lungs: CTAB, no retractions/use of accessory muscles, no vocal fremitus, no rhonchi, no crackle, no rales   Heart: regular rate, regular rhythm, no murmur   Abdomen: Mildly distended but soft, NT, bowel sounds normal   Extremities: Wrap noted to the right lower extremity      MD CONSULTS/ASSESSMENT AND PLAN:   Ortho   Date of Procedure: 1/13/2023       Pre-Op Diagnosis: Closed fracture of right femur, unspecified fracture morphology, unspecified portion of femur, [facility] [S72.91XA]       Post-Op Diagnosis: Same         Procedure(s):   RETROGRADE INTRAMEDULLARY NAILING RIGHT FEMUR  +++SYNTHES+++   +++LATEX ALLERGY+++   (STAFF TO OR 9)       Surgeon(s):   Jyoti Yates DO       Anesthesia: General       Estimated Blood Loss (mL): less than 50        Complications: None      Significant MEDICATIONS:   IV Ancef 2000mg Q8hrs   PRN IV Dilaudid 0.56mg x3   SQ Lovenox 40mg QD      ORDERS:   POST-OP   ENC C/DB Q2hrs WA, Regular diet, FWB as tolerated-1) Up to bedside evening of surgery 2) Bathroom privileges with assistance , PT/OT evals, EPC cuffs, I&O,Check operative dressing with vital signs and reinforce prn. PT/OT/SLP/CM ASSESSMENT OR NOTES:   DC planning   Updated plan of care. Post op IM nailing of right femur. Went to see pt. Discussed choices for KIA and pt does not want to discuss this now. She wants home. She stated she has all her equipment. Referral called to [facility] home care, I told pt I would return to get KIA choice, if needed, if she does not do well with therapy. Will follow-o    ADDEND: Pt insists on home. Does not want to give KIA choices. Therapies working with her.  [place] [facility] following-orders completed

## 2023-01-18 ENCOUNTER — TREATMENT (OUTPATIENT)
Dept: PHYSICAL THERAPY | Age: 63
End: 2023-01-18

## 2023-02-06 ENCOUNTER — OFFICE VISIT (OUTPATIENT)
Dept: PODIATRY | Age: 63
End: 2023-02-06

## 2023-02-06 VITALS — WEIGHT: 187 LBS | BODY MASS INDEX: 32.1 KG/M2 | TEMPERATURE: 98.2 F

## 2023-02-06 DIAGNOSIS — Z09 POSTOPERATIVE EXAMINATION: Primary | ICD-10-CM

## 2023-02-06 DIAGNOSIS — G57.81 DISORDER OF RIGHT SURAL NERVE: ICD-10-CM

## 2023-02-06 PROCEDURE — 99024 POSTOP FOLLOW-UP VISIT: CPT | Performed by: PODIATRIST

## 2023-02-06 NOTE — PROGRESS NOTES
Postop Progress Note    Subjective    Preeti Miles presents to the office 3 months  following ankle   pt had right leg sx fx femur . Pain is controlled with current analgesics. In therapy doing better    Objective    Vitals:    02/06/23 1605   Temp: 98.2 °F (36.8 °C)     General: alert, cooperative, and no distress  Incision: healing well    Assessment    Doing well postoperatively. Plan   1. Continue any current medications  2. Wound care discussed  3. Wound/Incision: healing well  4. Disposition:   Pt is to increase activities as tolerated. Should remain out of work until 2-  .  5. Diet: regular diet  6. Follow up: 6  weeks.      In therapy       Electronically signed by Zuly Mccollum DPM on 2/6/2023 at 4:14 PM

## 2023-03-17 ENCOUNTER — TELEPHONE (OUTPATIENT)
Dept: PHYSICAL THERAPY | Age: 63
End: 2023-03-17

## 2023-03-22 ENCOUNTER — EVALUATION (OUTPATIENT)
Dept: PHYSICAL THERAPY | Age: 63
End: 2023-03-22
Payer: COMMERCIAL

## 2023-03-22 DIAGNOSIS — S72.331D CLOSED DISPLACED OBLIQUE FRACTURE OF SHAFT OF RIGHT FEMUR WITH ROUTINE HEALING, SUBSEQUENT ENCOUNTER: Primary | ICD-10-CM

## 2023-03-22 PROCEDURE — 97161 PT EVAL LOW COMPLEX 20 MIN: CPT | Performed by: PHYSICAL THERAPIST

## 2023-03-22 NOTE — PROGRESS NOTES
[address] [facility]   Phone: [phone]   Fax: [phone]             Date:  3/22/2023   Patient: Rocío Esparza  : 1960  MRN: [id number]  Referring Provider: Mohsen Allison DO     Medical Diagnosis:      Diagnosis Orders   1. Closed displaced oblique fracture of shaft of right femur with routine healing, subsequent encounter            SUBJECTIVE:     Surgical procedure: RETROGRADE INTRAMEDULLARY NAILING RIGHT FEMUR  +++SYNTHES+++   +++LATEX ALLERGY+++    Date of surgery: 2023     Services provided following surgery: home PT     History: Pt reports fell while performing stair negotiation at home. Pt presents without assistive device today. Reports uses no device or w/w or w/c depending on where pt is going. Chief complaint: pain - in foot more than knee     Behavior: condition is getting better around knee, same in foot     Pain: tenderness around the knee area,  5/10 in R foot. Current: 2/10   around knee    Symptom Type/Quality: throbbing  Location[de-identified] knee: diffuse    Foot R :  constant pain. Imaging results: No results found.     Past Medical History:  Past Medical History:   Diagnosis Date    Asthma     well controlled per patient    Right foot pain     For OR 10/14/22     Past Surgical History:   Procedure Laterality Date    ANKLE SURGERY Right 2021    REPAIR ANTERIOR TALAR FIBULAR LIGAMENT RIGHT FOOT REPAIR PERONEAL TENDON RIGHT FOOT, SURAL NERVE LYSIS  W PRP performed by Keven Knight DPM at [place] [address] Right 2023    RETROGRADE INTRAMEDULLARY NAILING RIGHT FEMUR performed by Mohsen Allison DO at [place] [address] Right     Tendon repair    HYSTERECTOMY (CERVIX STATUS UNKNOWN)      KNEE SURGERY Bilateral     LEG SURGERY Right 10/14/2022    SURAL NERVE RESECTION RIGHT FOOT performed by Keven Knight DPM at [address] [place] [address]        Medications:   Current Outpatient Medications

## 2023-03-22 NOTE — PROGRESS NOTES
[address] [facility]   Phone: [phone]   Fax: [phone]      Physical Therapy Daily Treatment Note    Date: 3/22/2023  Patient Name: Lisy Parikh  : 1960   MRN: [id number]  [facility]: 2023    DOSx: 2023 - RETROGRADE INTRAMEDULLARY NAILING RIGHT FEMUR    Referring Provider: No referring provider defined for this encounter. Medical Diagnosis:      Diagnosis Orders   1. Closed displaced oblique fracture of shaft of right femur with routine healing, subsequent encounter          Precautions:  WBAT, no restrictions     Outcome Measure:  LEFS 46/     S: See eval.   O:   Time 0840 - 0915     Visit  Repeat outcome measure at mid point and end. Pain 2/10 - around knee, 5/10 R foot      ROM Hip:  Right:   AROM: 95° Flexion,  Nt° Extension, 40° Abduction,  20° ER, 25° IR     Left:   AROM: 105° Flexion,  NT° Extension, 30° Abduction,  30° ER, 30° IR        Knee:  Right:   AROM: 105° Flexion,  lacks 8 ° Extension     Left:   AROM: 110° Flexion,  0° Extension        Modalities            Manual            Stretch                  Exercise      Bike      Heel slides      QS      SLR      SAQ      LAQ      Hamstring Curl       TG squats      TG calf raises      Step-ups - FWD      Step-ups - LAT      Step-ups - BWD        NMR To improve balance for safe community and home ambulation    Resisted walk      FWD      BKWD      lat      March      Side stepping      Retro walk      Heel to toe      A:  Tolerated well.      P: Continue with rehab plan  Penne Goodpasture, PT DPT, PT [id number]    Treatment Charges: Mins Units   Initial Evaluation 35 1   Re-Evaluation     Ther Exercise         TE     Manual Therapy     MT     Ther Activities        TA     Gait Training          GT     Neuro Re-education NR     Modalities     Non-Billable Service Time     Other     Total Time/Units 35 1

## 2023-03-27 ENCOUNTER — OFFICE VISIT (OUTPATIENT)
Dept: PODIATRY | Age: 63
End: 2023-03-27
Payer: COMMERCIAL

## 2023-03-27 VITALS — BODY MASS INDEX: 32.1 KG/M2 | TEMPERATURE: 98.2 F | WEIGHT: 187 LBS

## 2023-03-27 DIAGNOSIS — M79.671 PAIN IN RIGHT FOOT: ICD-10-CM

## 2023-03-27 DIAGNOSIS — G89.18 PAIN FOLLOWING SURGERY OR PROCEDURE: ICD-10-CM

## 2023-03-27 DIAGNOSIS — G57.81 DISORDER OF RIGHT SURAL NERVE: Primary | ICD-10-CM

## 2023-03-27 PROCEDURE — 99213 OFFICE O/P EST LOW 20 MIN: CPT | Performed by: PODIATRIST

## 2023-03-27 PROCEDURE — 29580 STRAPPING UNNA BOOT: CPT | Performed by: PODIATRIST

## 2023-03-27 NOTE — PROGRESS NOTES
3/27/23  Shayne Ball : 1960 Sex: female  Age: 65 y.o. Patient was referred by Daniella Carlson DO    Chief Complaint   Patient presents with    Post-Op Check     10/14/22 right foot sx        SUBJECTIVE patient is seen for right ankle pain. Patient reinjured the right ankle back in February following she had a fibular fracture. Patient had undergone open reduction internal fixation for femur fracture patient is stating that the right ankle is sore again she feels that she reinjured it.   HPI  Review of Systems  Const: Denies constitutional symptoms  Musculo: Denies symptoms other than stated above  Skin: Denies symptoms other than stated above       Current Outpatient Medications:     L-methylfolate-B6-B12 (METANX) 0-28.942-5-74 MG CAPS capsule, Take 1 capsule by mouth daily, Disp: 60 capsule, Rfl: 2    Omega-3 Fatty Acids (FISH OIL) 1000 MG capsule, Take by mouth daily, Disp: , Rfl:   Allergies   Allergen Reactions    Latex      Leaves marks on skin    Aspirin Hives and Shortness Of Breath     sick  Other reaction(s): Disease type AND/OR category unknown (finding), GI Upset    Bee Venom Anaphylaxis     Other reaction(s): Disease type AND/OR category unknown (finding)    Codeine Hives and Shortness Of Breath     fatique  Other reaction(s): Disease type AND/OR category unknown (finding)    Penicillins Hives and Shortness Of Breath     swelling  Other reaction(s): Disease type AND/OR category unknown (finding), GI Upset  headaches      Soap Hives and Shortness Of Breath    Sulfa Antibiotics      Family history        Past Medical History:   Diagnosis Date    Asthma     well controlled per patient    Right foot pain     For OR 10/14/22     Past Surgical History:   Procedure Laterality Date    ANKLE SURGERY Right 2021    REPAIR ANTERIOR TALAR FIBULAR LIGAMENT RIGHT FOOT REPAIR PERONEAL TENDON RIGHT FOOT, SURAL NERVE LYSIS  W PRP performed by Mone Medina DPM at [address] [place] [address]

## 2023-03-27 NOTE — LETTER
March 27, 2023       Alla Wooten YOB: 1960   [address] [facility] [address] [address] Date of Visit:  3/27/2023       To Whom It May Concern:    Ekta Moore was seen in my clinic on 3/27/2023. She cannot return to work until next evaluation on 4/17/2023 per Dr. Tiara Roe    If you have any questions or concerns, please don't hesitate to call.     Sincerely,        Joshua Bland DPM

## 2023-03-28 ENCOUNTER — TREATMENT (OUTPATIENT)
Dept: PHYSICAL THERAPY | Age: 63
End: 2023-03-28
Payer: COMMERCIAL

## 2023-03-28 DIAGNOSIS — S72.331D CLOSED DISPLACED OBLIQUE FRACTURE OF SHAFT OF RIGHT FEMUR WITH ROUTINE HEALING, SUBSEQUENT ENCOUNTER: Primary | ICD-10-CM

## 2023-03-28 PROCEDURE — 97110 THERAPEUTIC EXERCISES: CPT | Performed by: PHYSICAL THERAPIST

## 2023-03-28 NOTE — PROGRESS NOTES
[address] [facility]   Phone: [phone]   Fax: [phone]      Physical Therapy Daily Treatment Note    Date: 3/28/2023  Patient Name: Netta Walker  : 1960   MRN: [id number]  [facility]: 2023    DOSx: 2023 - RETROGRADE INTRAMEDULLARY NAILING RIGHT FEMUR    Referring Provider: No referring provider defined for this encounter. Medical Diagnosis:   S72.331D    Closed displaced oblique fracture of shaft of right femur with routine healing, subsequent encounter          Precautions:  WBAT, no restrictions     Outcome Measure:  LEFS 46/80     S: Pt reports slight tenderness in leg but foot has pain. Pt wearing CAM walker boot. Reports podiatrist stated not to do any therapy for foot. O:   Time [id number]      Visit  Repeat outcome measure at mid point and end. Pain See above. ROM Hip:  Right:   AROM: 95° Flexion,  Nt° Extension, 40° Abduction,  20° ER, 25° IR     Left:   AROM: 105° Flexion,  NT° Extension, 30° Abduction,  30° ER, 30° IR        Knee:  Right:   AROM: 105° Flexion,  lacks 8 ° Extension     Left:   AROM: 110° Flexion,  0° Extension        Modalities            Manual            Stretch                  Exercise      Bike      Heel slides 2 x 10  With strap     QS 20 x 3s holds      SLR X 10  3 way     SAQ 2 x 10      LAQ 2 x 10       Hamstring Curl  2 x 10   OTB    Seated marches  20x      Hip adduction  20x  ball    Hip abduction  20x  GTB     TG squats      TG calf raises      Step-ups - FWD      Step-ups - LAT      Step-ups - BWD        NMR To improve balance for safe community and home ambulation    Resisted walk      FWD      BKWD      lat      March      Side stepping      Retro walk      Heel to toe      A:  Tolerated well. Pt wore Cam boot for all of session.      P: Continue with rehab plan  Gomez Velasquez PT DPT, PT [id number]    Treatment Charges: Mins Units   Initial Evaluation     Re-Evaluation     Ther Exercise         TE

## 2023-03-30 ENCOUNTER — TREATMENT (OUTPATIENT)
Dept: PHYSICAL THERAPY | Age: 63
End: 2023-03-30

## 2023-03-30 DIAGNOSIS — S72.331D CLOSED DISPLACED OBLIQUE FRACTURE OF SHAFT OF RIGHT FEMUR WITH ROUTINE HEALING, SUBSEQUENT ENCOUNTER: Primary | ICD-10-CM

## 2023-03-30 NOTE — PROGRESS NOTES
[address] [facility] and [facility]   Phone: [phone]   Fax: [phone]      Physical Therapy Daily Treatment Note    Date: 3/30/2023  Patient Name: Lexis Braxton  : 1960   MRN: [id number]  [facility]: 2023    DOSx: 2023 - RETROGRADE INTRAMEDULLARY NAILING RIGHT FEMUR    Referring Provider: No referring provider defined for this encounter. Medical Diagnosis:   S72.331D    Closed displaced oblique fracture of shaft of right femur with routine healing, subsequent encounter          Precautions:  WBAT, no restrictions     Outcome Measure:  LEFS 46/80     S: Pt reports no pain in leg, foot is sore. Pt again wearing CAM walker boot. O:   Time 0800- 0830     Visit 3/12 Repeat outcome measure at mid point and end. Pain See above. ROM Hip:  Right:   AROM: 95° Flexion,  Nt° Extension, 40° Abduction,  20° ER, 25° IR     Left:   AROM: 105° Flexion,  NT° Extension, 30° Abduction,  30° ER, 30° IR        Knee:  Right:   AROM: 105° Flexion,  lacks 8 ° Extension     Left:   AROM: 110° Flexion,  0° Extension        Modalities            Manual            Stretch                  Exercise      Bike      Heel slides 2 x 10  With strap     QS 20 x 3s holds      SLR    2 X 10  3 way     SAQ 2 x 10      LAQ 2 x 10       Hamstring Curl  2 x 10   GTB    Seated marches  20x      Hip adduction  20x  ball    Hip abduction  20x  GTB     TG squats      TG calf raises      Step-ups - FWD      Step-ups - LAT      Step-ups - BWD        NMR To improve balance for safe community and home ambulation    Resisted walk      FWD      BKWD      lat      March      Side stepping      Retro walk      Heel to toe      A:  Tolerated well. Pt wore Cam boot for all of session.      P: Continue with rehab plan  Yeison Kelley PT DPT, PT [id number]    Treatment Charges: Mins Units   Initial Evaluation     Re-Evaluation     Ther Exercise         TE 30 2   Manual Therapy     MT     Ther Activities        TA

## 2023-04-04 ENCOUNTER — TREATMENT (OUTPATIENT)
Dept: PHYSICAL THERAPY | Age: 63
End: 2023-04-04
Payer: COMMERCIAL

## 2023-04-04 DIAGNOSIS — S72.331D CLOSED DISPLACED OBLIQUE FRACTURE OF SHAFT OF RIGHT FEMUR WITH ROUTINE HEALING, SUBSEQUENT ENCOUNTER: Primary | ICD-10-CM

## 2023-04-04 PROCEDURE — 97110 THERAPEUTIC EXERCISES: CPT

## 2023-04-04 NOTE — PROGRESS NOTES
[address] [facility]   Phone: [phone]   Fax: [phone]      Physical Therapy Daily Treatment Note    Date: 2023  Patient Name: Sully Slaughter  : 1960   MRN: [id number]  [facility]: 2023    DOSx: 2023 - RETROGRADE INTRAMEDULLARY NAILING RIGHT FEMUR    Referring Provider: Mounika Chavez DPM  [address] [facility] [address], [address] [facility] Diagnosis:   S72.331D    Closed displaced oblique fracture of shaft of right femur with routine healing, subsequent encounter          Precautions:  WBAT, no restrictions     Outcome Measure:  LEFS 46/80     S: Pt reports no pain in leg. She is having foot pain still. Wearing CAM boot and using ww.   O:   Time 7535- 0818     Visit  Repeat outcome measure at mid point and end. Pain See above. ROM Hip:  Right:   AROM: 95° Flexion,  Nt° Extension, 40° Abduction,  20° ER, 25° IR     Left:   AROM: 105° Flexion,  NT° Extension, 30° Abduction,  30° ER, 30° IR        Knee:  Right:   AROM: 105° Flexion,  lacks 8 ° Extension     Left:   AROM: 110° Flexion,  0° Extension        Modalities            Manual            Stretch                  Exercise      Bike      Heel slides 2 x 10  With strap     QS 20 x 3s holds      SLR    2 X 10  3 way     SAQ 2 x 10      LAQ 3 x 10       Hamstring Curl  2 x 10   GTB    Seated marches  20x      Hip adduction  20x  ball    Hip abduction  20x  GTB     TG squats      TG calf raises      Step-ups - FWD      Step-ups - LAT      Step-ups - BWD        NMR To improve balance for safe community and home ambulation    Resisted walk      FWD      BKWD      lat      March      Side stepping      Retro walk      Heel to toe      A:  Tolerated well. Pt wore Cam boot for all of session.      P: Continue with rehab plan  Dung Soto, PTA [id number]  Treatment Charges: Mins Units   Initial Evaluation     Re-Evaluation     Ther Exercise         TE 23 2   Manual Therapy     MT     Ther

## 2023-04-06 ENCOUNTER — TREATMENT (OUTPATIENT)
Dept: PHYSICAL THERAPY | Age: 63
End: 2023-04-06

## 2023-04-06 DIAGNOSIS — S72.331D CLOSED DISPLACED OBLIQUE FRACTURE OF SHAFT OF RIGHT FEMUR WITH ROUTINE HEALING, SUBSEQUENT ENCOUNTER: Primary | ICD-10-CM

## 2023-04-06 NOTE — PROGRESS NOTES
[address] [facility]   Phone: [phone]   Fax: [phone]      Physical Therapy Daily Treatment Note    Date: 2023  Patient Name: Lidya Rodriguez  : 1960   MRN: [id number]  [facility]: 2023    DOSx: 2023 - RETROGRADE INTRAMEDULLARY NAILING RIGHT FEMUR    Referring Provider: Afia Albright DPM  [address], [address] [facility] Diagnosis:   S72.331D    Closed displaced oblique fracture of shaft of right femur with routine healing, subsequent encounter          Precautions:  WBAT, no restrictions     Outcome Measure:  LEFS 46/80     S: Pt reports no pain in leg. Wearing CAM boot and using ww.   O:   Time 4480-872     Visit  Repeat outcome measure at mid point and end. Pain See above. ROM Hip:  Right:   AROM: 95° Flexion,  Nt° Extension, 40° Abduction,  20° ER, 25° IR     Left:   AROM: 105° Flexion,  NT° Extension, 30° Abduction,  30° ER, 30° IR        Knee:  Right:   AROM: 105° Flexion,  lacks 8 ° Extension     Left:   AROM: 110° Flexion,  0° Extension        Modalities            Manual            Stretch                  Exercise      Bike 5 min. Heel slides 2 x 10       QS 20 x 3s holds      SLR    2 X 10  3 way     SAQ 2 x 10      LAQ 3 x 10       Hamstring Curl  2 x 10   GTB    Seated marches  20x      Hip adduction  20x  ball    Hip abduction  20x  GTB     TG squats      TG calf raises      Step-ups - FWD      Step-ups - LAT      Step-ups - BWD        NMR To improve balance for safe community and home ambulation    Resisted walk      FWD      BKWD      lat      March      Side stepping      Retro walk      Heel to toe      A:  Tolerated well. Pt had CAM boot off for bike only.      P: Continue with rehab plan  Damir Botello, PTA [id number]  Treatment Charges: Mins Units   Initial Evaluation     Re-Evaluation     Ther Exercise         TE 26 2   Manual Therapy     MT     Ther Activities        TA     Gait Training

## 2023-04-17 ENCOUNTER — TREATMENT (OUTPATIENT)
Dept: PHYSICAL THERAPY | Age: 63
End: 2023-04-17
Payer: COMMERCIAL

## 2023-04-17 ENCOUNTER — OFFICE VISIT (OUTPATIENT)
Dept: PODIATRY | Age: 63
End: 2023-04-17
Payer: COMMERCIAL

## 2023-04-17 VITALS — TEMPERATURE: 98.2 F | BODY MASS INDEX: 32.1 KG/M2 | WEIGHT: 187 LBS

## 2023-04-17 DIAGNOSIS — G57.81 DISORDER OF RIGHT SURAL NERVE: Primary | ICD-10-CM

## 2023-04-17 DIAGNOSIS — G89.18 PAIN FOLLOWING SURGERY OR PROCEDURE: ICD-10-CM

## 2023-04-17 DIAGNOSIS — G57.81 DISORDER OF RIGHT SURAL NERVE: ICD-10-CM

## 2023-04-17 DIAGNOSIS — S86.311D PERONEAL TENDON RUPTURE, RIGHT, SUBSEQUENT ENCOUNTER: ICD-10-CM

## 2023-04-17 DIAGNOSIS — G89.29 CHRONIC PAIN OF RIGHT ANKLE: ICD-10-CM

## 2023-04-17 DIAGNOSIS — S93.401D RUPTURE OF LIGAMENT OF ANKLE, RIGHT, SUBSEQUENT ENCOUNTER: ICD-10-CM

## 2023-04-17 DIAGNOSIS — S72.331D CLOSED DISPLACED OBLIQUE FRACTURE OF SHAFT OF RIGHT FEMUR WITH ROUTINE HEALING, SUBSEQUENT ENCOUNTER: Primary | ICD-10-CM

## 2023-04-17 DIAGNOSIS — Z09 POSTOPERATIVE EXAMINATION: ICD-10-CM

## 2023-04-17 DIAGNOSIS — M25.571 CHRONIC PAIN OF RIGHT ANKLE: ICD-10-CM

## 2023-04-17 PROCEDURE — 97110 THERAPEUTIC EXERCISES: CPT

## 2023-04-17 PROCEDURE — 99213 OFFICE O/P EST LOW 20 MIN: CPT | Performed by: PODIATRIST

## 2023-04-17 NOTE — PROGRESS NOTES
sensation: normal  Achilles reflex: 2+  Babinski reflex: 2+    Range of Motion    Passive  Ankle dorsiflexion: pain  Ankle plantar flexion: pain  Ankle eversion: pain  Ankle inversion: pain             Right Ankle Exam     Tenderness   The patient is experiencing tenderness in the ATF, deltoid and lateral malleolus. Swelling: mild    Range of Motion   Dorsiflexion:  abnormal   Plantar flexion:  abnormal   Eversion:  abnormal   Inversion:  abnormal     Muscle Strength   The patient has normal right ankle strength. Vascular: pulses  dp  pt    Capillary Refill Time:   Hair growth  Skin:    Edema:    Neurologic:      Musculoskeletal/ Orthopedic examination:    NAIL  Web space   Derm:          Assessment and Plan:Shaina was seen today for post-op check. Diagnoses and all orders for this visit:    Disorder of right sural nerve    Pain following surgery or procedure      Ankle Stirrup Brace Dispensing  Signed informed consent was obtained from the patient. Brace was fitted and applied. Patient was counseled. Brace is medically necessary to promote and expedite healing ad reduce pain and swelling. No follow-ups on file. 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 Seen By:  Vu Feliz DPM      Document was created using voice recognition software. Note was reviewed, however may contain grammatical errors.

## 2023-04-17 NOTE — PROGRESS NOTES
TE 40 3   Manual Therapy     MT     Ther Activities        TA     Gait Training          GT     Neuro Re-education NR     Modalities     Non-Billable Service Time     Other     Total Time/Units 40 3

## 2023-04-20 ENCOUNTER — TREATMENT (OUTPATIENT)
Dept: PHYSICAL THERAPY | Age: 63
End: 2023-04-20

## 2023-04-20 DIAGNOSIS — S72.331D CLOSED DISPLACED OBLIQUE FRACTURE OF SHAFT OF RIGHT FEMUR WITH ROUTINE HEALING, SUBSEQUENT ENCOUNTER: Primary | ICD-10-CM

## 2023-04-20 NOTE — PROGRESS NOTES
[address] [facility] and [facility]   Phone: [phone]   Fax: [phone]      Physical Therapy Daily Treatment Note    Date: 2023  Patient Name: Ruben Junior  : 1960   MRN: [id number]  [facility]: 2023    DOSx: 2023 - RETROGRADE INTRAMEDULLARY NAILING RIGHT FEMUR    Referring Provider: Richi Soriano DO  [address] [facility],  [address] [place] [address]     Medical Diagnosis:   S72.331D    Closed displaced oblique fracture of shaft of right femur with routine healing, subsequent encounter          Precautions:  WBAT, no restrictions     Outcome Measure:  LEFS 46/80     S: Pt reports no pain in leg. Continues to have pain in R foot. No rating given. O:   Time [id number]     Visit  Repeat outcome measure at mid point and end. Pain See above. ROM Hip:  Right:   AROM: 95° Flexion,  Nt° Extension, 40° Abduction,  20° ER, 25° IR     Left:   AROM: 105° Flexion,  NT° Extension, 30° Abduction,  30° ER, 30° IR        Knee:  Right:   AROM: 105° Flexion,  lacks 8 ° Extension     Left:   AROM: 110° Flexion,  0° Extension        Modalities            Manual            Stretch                  Exercise      Bike 10 min. Heel slides 2 x 10       QS 20 x 3s holds      SLR    2 X 10  3 way     SAQ 2 x 10      LAQ 3 x 10       Hamstring Curl  2 x 10   BTB    Seated marches  20x      Hip adduction  20x  ball    Hip abduction  20x  BTB     Sit to stand  X 10     SLR 4 ways  2 x 10     Step-ups - FWD      Step-ups - LAT      Step-ups - BWD        NMR To improve balance for safe community and home ambulation    Resisted walk      FWD      BKWD      lat      March      Side stepping      Retro walk      Heel to toe      A:  Tolerated well. Pt had air cast on for entire session. No c/o pain following treatment.      P: Continue with rehab plan  Maye Marshall, PTA [id number]  Treatment Charges: Mins Units   Initial Evaluation     Re-Evaluation     Ther Exercise         TE 46 3   Manual Therapy

## 2023-04-25 ENCOUNTER — TREATMENT (OUTPATIENT)
Dept: PHYSICAL THERAPY | Age: 63
End: 2023-04-25
Payer: COMMERCIAL

## 2023-04-25 DIAGNOSIS — S72.331D CLOSED DISPLACED OBLIQUE FRACTURE OF SHAFT OF RIGHT FEMUR WITH ROUTINE HEALING, SUBSEQUENT ENCOUNTER: Primary | ICD-10-CM

## 2023-04-25 PROCEDURE — 97110 THERAPEUTIC EXERCISES: CPT

## 2023-04-25 NOTE — PROGRESS NOTES
[address] [facility] and [facility]   Phone: [phone]   Fax: [phone]      Physical Therapy Daily Treatment Note    Date: 2023  Patient Name: Navya Galaviz  : 1960   MRN: [id number]  [facility]: 2023    DOSx: 2023 - RETROGRADE INTRAMEDULLARY NAILING RIGHT FEMUR    Referring Provider: No referring provider defined for this encounter. Medical Diagnosis:   S72.331D    Closed displaced oblique fracture of shaft of right femur with routine healing, subsequent encounter          Precautions:  WBAT, no restrictions     Outcome Measure:  LEFS 46/80     S: Pt reports no pain in leg. Pt continues to use ww, however reports because of foot pain. O:   Time [id number]     Visit  Repeat outcome measure at mid point and end. Pain See above. ROM Hip:  Right:   AROM: 95° Flexion,  Nt° Extension, 40° Abduction,  20° ER, 25° IR     Left:   AROM: 105° Flexion,  NT° Extension, 30° Abduction,  30° ER, 30° IR        Knee:  Right:   AROM: 105° Flexion,  lacks 8 ° Extension     Left:   AROM: 110° Flexion,  0° Extension        Modalities            Manual            Stretch                  Exercise      Bike 10 min. Heel slides 2 x 10       QS 20 x 3s holds      SLR    2 X 10  3 way     SAQ 2 x 10      LAQ 3 x 10       Hamstring Curl  2 x 10   BTB    Seated marches  20x      Hip adduction  20x  ball    Hip abduction  20x  BTB     Sit to stand  X 10     SLR 4 ways  2 x 10     Step-ups - FWD      Step-ups - LAT      Step-ups - BWD        NMR To improve balance for safe community and home ambulation    Resisted walk      FWD      BKWD      lat      March      Side stepping      Retro walk      Heel to toe      A:  Tolerated well. Pt had air cast on for entire session. No c/o pain following treatment.      P: Continue with rehab plan  Jakub Che, PTA [id number]  Treatment Charges: Mins Units   Initial Evaluation     Re-Evaluation     Ther Exercise         TE 38 3   Manual Therapy

## 2023-04-27 ENCOUNTER — TREATMENT (OUTPATIENT)
Dept: PHYSICAL THERAPY | Age: 63
End: 2023-04-27

## 2023-04-27 DIAGNOSIS — S72.331D CLOSED DISPLACED OBLIQUE FRACTURE OF SHAFT OF RIGHT FEMUR WITH ROUTINE HEALING, SUBSEQUENT ENCOUNTER: Primary | ICD-10-CM

## 2023-04-27 NOTE — PROGRESS NOTES
[address] [facility]   Phone: [phone]   Fax: [phone]      Physical Therapy Daily Treatment Note    Date: 2023  Patient Name: Lani Glynn  : 1960   MRN: [id number]  [facility]: 2023    DOSx: 2023 - RETROGRADE INTRAMEDULLARY NAILING RIGHT FEMUR    Referring Provider: No referring provider defined for this encounter. Medical Diagnosis:   S72.331D    Closed displaced oblique fracture of shaft of right femur with routine healing, subsequent encounter          Precautions:  WBAT, no restrictions     Outcome Measure:  LEFS 46/80     S: Pt reports no pain in leg; reports gets a small pain that comes and goes. O:   Time [id number] 6407      Visit 10/12 Repeat outcome measure at mid point and end. Pain See above. ROM Hip:  Right:   AROM: 95° Flexion,  Nt° Extension, 40° Abduction,  20° ER, 25° IR     Left:   AROM: 105° Flexion,  NT° Extension, 30° Abduction,  30° ER, 30° IR        Knee:  Right:   AROM: 105° Flexion,  lacks 8 ° Extension     Left:   AROM: 110° Flexion,  0° Extension        Modalities            Manual            Stretch                  Exercise      Bike 10 min. Heel slides 2 x 10       QS 20 x 3s holds      SLR    2 X 10  3 way     SAQ 2 x 10      LAQ 3 x 10       Hamstring Curl  2 x 10   BTB    Seated marches  20x      Hip adduction  20x  ball    Hip abduction  20x  BTB     Sit to stand  X 10     SLR 4 ways      Step-ups - FWD      Step-ups - LAT      Step-ups - BWD        NMR To improve balance for safe community and home ambulation    Resisted walk      FWD      BKWD      lat      March      Side stepping      Retro walk      Heel to toe      A:  Tolerated well. Pt had air cast on for entire session.      P: Continue with rehab plan  [place], [place] [id number]  Treatment Charges: Mins Units   Initial Evaluation     Re-Evaluation     Ther Exercise         TE 38 3   Manual Therapy     MT     Ther Activities        TA     Gait

## 2023-05-02 ENCOUNTER — TREATMENT (OUTPATIENT)
Dept: PHYSICAL THERAPY | Age: 63
End: 2023-05-02

## 2023-05-02 DIAGNOSIS — S72.331D CLOSED DISPLACED OBLIQUE FRACTURE OF SHAFT OF RIGHT FEMUR WITH ROUTINE HEALING, SUBSEQUENT ENCOUNTER: Primary | ICD-10-CM

## 2023-05-02 NOTE — PROGRESS NOTES
[address] [facility]   Phone: [phone]   Fax: [phone]      Physical Therapy Daily Treatment Note    Date: 2023  Patient Name: Margo Horton  : 1960   MRN: [id number]  [facility]: 2023    DOSx: 2023 - RETROGRADE INTRAMEDULLARY NAILING RIGHT FEMUR    Referring Provider: No referring provider defined for this encounter. Medical Diagnosis:   S72.331D    Closed displaced oblique fracture of shaft of right femur with routine healing, subsequent encounter          Precautions:  WBAT, no restrictions     Outcome Measure:  LEFS 46/80     S: Pt reports no pain in leg. O:   Time 3745-655     Visit 10/12 Repeat outcome measure at mid point and end. Pain See above. ROM Hip:  Right:   AROM: 95° Flexion,  Nt° Extension, 40° Abduction,  20° ER, 25° IR     Left:   AROM: 105° Flexion,  NT° Extension, 30° Abduction,  30° ER, 30° IR        Knee:  Right:   AROM: 105° Flexion,  lacks 8 ° Extension     Left:   AROM: 110° Flexion,  0° Extension        Modalities            Manual            Stretch                  Exercise      Bike 10 min. Heel slides 2 x 10       QS 20 x 3s holds      SLR    2 X 10  3 way     SAQ 2 x 10      LAQ 3 x 10       Hamstring Curl  2 x 10   BTB    Seated marches  20x      Hip adduction  20x  ball    Hip abduction  20x  BTB     Sit to stand  X 10     SLR 4 ways      Step-ups - FWD      Step-ups - LAT      Step-ups - BWD        NMR To improve balance for safe community and home ambulation    Resisted walk      FWD      BKWD      lat      March      Side stepping      Retro walk      Heel to toe      A:  Tolerated well. Pt had air cast on for entire session.      P: Continue with rehab plan  [place], [place]   Treatment Charges: Mins Units   Initial Evaluation     Re-Evaluation     Ther Exercise         TE 38 3   Manual Therapy     MT     Ther Activities        TA     Gait Training          GT     Neuro Re-education NR

## 2023-05-04 ENCOUNTER — TREATMENT (OUTPATIENT)
Dept: PHYSICAL THERAPY | Age: 63
End: 2023-05-04

## 2023-05-04 DIAGNOSIS — S72.331D CLOSED DISPLACED OBLIQUE FRACTURE OF SHAFT OF RIGHT FEMUR WITH ROUTINE HEALING, SUBSEQUENT ENCOUNTER: Primary | ICD-10-CM

## 2023-05-04 NOTE — PROGRESS NOTES
[address] [facility] and [facility]   Phone: [phone]   Fax: [phone]      Physical Therapy Daily Treatment Note    Date: 2023  Patient Name: Yudy Anglin  : 1960   MRN: [id number]  [facility]: 2023    DOSx: 2023 - RETROGRADE INTRAMEDULLARY NAILING RIGHT FEMUR    Referring Provider: No referring provider defined for this encounter. Medical Diagnosis:   S72.331D    Closed displaced oblique fracture of shaft of right femur with routine healing, subsequent encounter          Precautions:  WBAT, no restrictions     Outcome Measure:  LEFS 46/80     S: Pt reports no pain in leg. She reports shopping for a significant amount of time for flowers and she had to sit d/t pain. She reports she had been standing/walking for at least 30 min. At the time. O:   Time 5845-032     Visit  Repeat outcome measure at mid point and end. Pain See above. ROM Hip:  Right:   AROM: 95° Flexion,  Nt° Extension, 40° Abduction,  20° ER, 25° IR     Left:   AROM: 105° Flexion,  NT° Extension, 30° Abduction,  30° ER, 30° IR        Knee:  Right:   AROM: 105° Flexion,  lacks 8 ° Extension     Left:   AROM: 110° Flexion,  0° Extension        Modalities            Manual            Stretch                  Exercise      Bike 10 min. Heel slides 2 x 10       QS 20 x 3s holds      SLR    2 X 10  3 way     SAQ 2 x 10      LAQ 3 x 10       Hamstring Curl  2 x 10   BTB    Seated marches  20x      Hip adduction  20x  ball    Hip abduction  20x  BTB     Sit to stand  X 10     SLR 4 ways  2 x 10     Step-ups - FWD      Step-ups - LAT      Step-ups - BWD        NMR To improve balance for safe community and home ambulation    Resisted walk      FWD      BKWD      lat      March      Side stepping      Retro walk      Heel to toe      A:  Tolerated well. Pt had air cast on for entire session. Pt with no c/o pain following treatment.      P: Continue with rehab plan  Sara Rodriguez PTA

## 2023-05-09 ENCOUNTER — TREATMENT (OUTPATIENT)
Dept: PHYSICAL THERAPY | Age: 63
End: 2023-05-09
Payer: COMMERCIAL

## 2023-05-09 DIAGNOSIS — S72.331D CLOSED DISPLACED OBLIQUE FRACTURE OF SHAFT OF RIGHT FEMUR WITH ROUTINE HEALING, SUBSEQUENT ENCOUNTER: Primary | ICD-10-CM

## 2023-05-09 PROCEDURE — 97164 PT RE-EVAL EST PLAN CARE: CPT | Performed by: PHYSICAL THERAPIST

## 2023-05-09 NOTE — PROGRESS NOTES
[address] [facility]   Phone: [phone]   Fax: [phone]        Referring Provider: Khushbu Nicolas DO        Medical Diagnosis:      Closed displaced oblique fracture of shaft of right femur with routine healing, subsequent encounter            CERTIFICATION PERIOD:  3/22/2023  to 5/5/2023. ATTENDANCE:  Patient has attended 15 of 14 scheduled treatments from 3/22/2023   to 5/9/2023 . TREATMENTS RECEIVED:  therapeutic exercise    INITIAL STATUS:    Observations: Well nourished female with normal affect. Rises with 2 UE assist  from chair. Ambulates without assistive device with antalgic gait. Inspection: Incision edges approximated, no purulent drainage, no redness, no swelling, no tenderness, and not hot to touch. Gait: antalgic gait     Palpation: Tender to palpation lateral knee and behind knee medial side. Joint/Motion:     Hip:  Right:   AROM: 95° Flexion,  Nt° Extension, 40° Abduction,  20° ER, 25° IR     Left:   AROM: 105° Flexion,  NT° Extension, 30° Abduction,  30° ER, 30° IR        Knee:  Right:   AROM: 105° Flexion,  lacks 8 ° Extension     Left:   AROM: 110° Flexion,  0° Extension        Strength:  Hip:  Right: Flexion 4+/5,   Abduction 4+/5, ER 4+/5, IR 4+/5    Left: Flexion 5/5,  Abduction 5/5, ER 5/5, IR 5/5      Knee:   Right: Flexion 5-/5,  Extension 4/5  Left: Flexion 5/5,  Extension 5/5    CURRENT STATUS:    Observations: Well nourished female with normal affect. Rises with 0  UE assist  from chair. Ambulates without assistive device with antalgic gait but improving. Inspection: Incision edges approximated, no purulent drainage, no redness, no swelling, no tenderness, and not hot to touch. Gait: antalgic gait but improving. Ambulates without AD today. Reports uses walker for longer distances.        Palpation: Tender to palpation lateral knee R      Joint/Motion:     Hip:  Right:   AROM: 95° Flexion,  Nt° Extension, 40°
Gait Training          GT     Neuro Re-education NR     Modalities     Non-Billable Service Time     Other     Total Time/Units 28 1

## 2023-05-15 ENCOUNTER — OFFICE VISIT (OUTPATIENT)
Dept: PODIATRY | Age: 63
End: 2023-05-15
Payer: COMMERCIAL

## 2023-05-15 VITALS — TEMPERATURE: 97.7 F | WEIGHT: 187 LBS | BODY MASS INDEX: 32.1 KG/M2

## 2023-05-15 DIAGNOSIS — G57.81 DISORDER OF RIGHT SURAL NERVE: Primary | ICD-10-CM

## 2023-05-15 DIAGNOSIS — G89.18 PAIN FOLLOWING SURGERY OR PROCEDURE: ICD-10-CM

## 2023-05-15 PROCEDURE — 99213 OFFICE O/P EST LOW 20 MIN: CPT | Performed by: PODIATRIST

## 2023-05-15 NOTE — PROGRESS NOTES
dorsiflexion: pain  Ankle plantar flexion: pain  Ankle eversion: pain  Ankle inversion: pain      Tests  Anterior drawer: negative            Right Ankle Exam     Tenderness   The patient is experiencing tenderness in the ATF, deltoid and lateral malleolus. Swelling: mild    Range of Motion   Dorsiflexion:  abnormal   Plantar flexion:  abnormal   Eversion:  abnormal   Inversion:  abnormal     Muscle Strength   The patient has normal right ankle strength. Vascular: pulses  dp  pt    Capillary Refill Time:   Hair growth  Skin:    Edema:    Neurologic:      Musculoskeletal/ Orthopedic examination:    NAIL  Web space   Derm:          Assessment and Plan:Shaina was seen today for post-op check. Diagnoses and all orders for this visit:    Disorder of right sural nerve    Pain following surgery or procedure      Ankle Stirrup Brace Dispensing  Signed informed consent was obtained from the patient. Brace was fitted and applied. Patient was counseled. Brace is medically necessary to promote and expedite healing ad reduce pain and swelling. No follow-ups on file. work   no standing more then  1 hour per day   x 8 weeks  must sit if needed do to ankle swelling. Seen By:  Darek Kennedy DPM      Document was created using voice recognition software. Note was reviewed, however may contain grammatical errors.

## 2023-07-17 ENCOUNTER — OFFICE VISIT (OUTPATIENT)
Dept: PODIATRY | Age: 63
End: 2023-07-17
Payer: COMMERCIAL

## 2023-07-17 VITALS
DIASTOLIC BLOOD PRESSURE: 84 MMHG | SYSTOLIC BLOOD PRESSURE: 142 MMHG | BODY MASS INDEX: 32.1 KG/M2 | TEMPERATURE: 98.2 F | WEIGHT: 187 LBS

## 2023-07-17 DIAGNOSIS — G57.81 DISORDER OF RIGHT SURAL NERVE: Primary | ICD-10-CM

## 2023-07-17 DIAGNOSIS — G89.18 PAIN FOLLOWING SURGERY OR PROCEDURE: ICD-10-CM

## 2023-07-17 PROCEDURE — 99212 OFFICE O/P EST SF 10 MIN: CPT | Performed by: PODIATRIST

## 2023-07-17 NOTE — PROGRESS NOTES
3/27/23  Alvin Villagomez : 1960 Sex: female  Age: 59 y.o. Patient was referred by Corwin Landau, DO    Chief Complaint   Patient presents with    Post-Op Check    Foot Pain       SUBJECTIVE patient is seen for right ankle pain. Patient reinjured the right ankle back in February following she had a fibular fracture. Foot Pain   This is a recurrent problem.    Review of Systems  Const: Denies constitutional symptoms  Musculo: Denies symptoms other than stated above  Skin: Denies symptoms other than stated above       Current Outpatient Medications:     Omega-3 Fatty Acids (FISH OIL) 1000 MG capsule, Take by mouth daily, Disp: , Rfl:   Allergies   Allergen Reactions    Latex      Leaves marks on skin    Aspirin Hives and Shortness Of Breath     sick  Other reaction(s): Disease type AND/OR category unknown (finding), GI Upset    Bee Venom Anaphylaxis     Other reaction(s): Disease type AND/OR category unknown (finding)    Codeine Hives and Shortness Of Breath     fatique  Other reaction(s): Disease type AND/OR category unknown (finding)    Penicillins Hives and Shortness Of Breath     swelling  Other reaction(s): Disease type AND/OR category unknown (finding), GI Upset  headaches      Soap Hives and Shortness Of Breath    Sulfa Antibiotics      Family history        Past Medical History:   Diagnosis Date    Asthma     well controlled per patient    Right foot pain     For OR 10/14/22     Past Surgical History:   Procedure Laterality Date    ANKLE SURGERY Right 2021    REPAIR ANTERIOR TALAR FIBULAR LIGAMENT RIGHT FOOT REPAIR PERONEAL TENDON RIGHT FOOT, SURAL NERVE LYSIS  W PRP performed by Sinai Velasco DPM at [address] Right 2023    RETROGRADE INTRAMEDULLARY NAILING RIGHT FEMUR performed by Lyric Nath DO at [facility] Right     Tendon repair    HYSTERECTOMY (CERVIX STATUS UNKNOWN)      KNEE SURGERY Bilateral     LEG SURGERY Right

## 2023-08-28 ENCOUNTER — OFFICE VISIT (OUTPATIENT)
Dept: PODIATRY | Age: 63
End: 2023-08-28
Payer: COMMERCIAL

## 2023-08-28 VITALS
SYSTOLIC BLOOD PRESSURE: 138 MMHG | TEMPERATURE: 98.2 F | BODY MASS INDEX: 32.1 KG/M2 | DIASTOLIC BLOOD PRESSURE: 79 MMHG | WEIGHT: 187 LBS

## 2023-08-28 DIAGNOSIS — M79.671 PAIN IN RIGHT FOOT: ICD-10-CM

## 2023-08-28 DIAGNOSIS — G57.81 DISORDER OF RIGHT SURAL NERVE: Primary | ICD-10-CM

## 2023-08-28 DIAGNOSIS — G89.18 PAIN FOLLOWING SURGERY OR PROCEDURE: ICD-10-CM

## 2023-08-28 PROCEDURE — 99213 OFFICE O/P EST LOW 20 MIN: CPT | Performed by: PODIATRIST

## 2023-08-28 RX ORDER — NABUMETONE 500 MG/1
500 TABLET, FILM COATED ORAL 2 TIMES DAILY
Qty: 120 TABLET | Refills: 0 | Status: SHIPPED | OUTPATIENT
Start: 2023-08-28 | End: 2023-10-27

## 2023-08-28 RX ORDER — CLOBETASOL PROPIONATE 0.5 MG/G
OINTMENT TOPICAL
Qty: 60 G | Refills: 1 | Status: SHIPPED | OUTPATIENT
Start: 2023-08-28

## 2023-08-28 NOTE — PROGRESS NOTES
normal  Deep peroneal nerve sensation: normal  Sural nerve sensation: normal  Achilles reflex: 2+  Babinski reflex: 2+    Muscle Strength  Ankle dorsiflexion: 5  Ankle plantar flexion: 5  Ankle inversion: 5  Ankle eversion: 5  Great toe extension: 5  Great toe flexion: 5    Range of Motion    Passive  Ankle dorsiflexion: pain  Ankle plantar flexion: pain  Ankle eversion: pain  Ankle inversion: pain      Tests  Anterior drawer: negative            Right Ankle Exam     Tenderness   The patient is experiencing tenderness in the ATF, deltoid and lateral malleolus. Swelling: mild    Range of Motion   Dorsiflexion:  abnormal   Plantar flexion:  abnormal   Eversion:  abnormal   Inversion:  abnormal     Muscle Strength   The patient has normal right ankle strength. Dorsiflexion:  5/5  Plantar flexion:  5/5           Vascular: pulses  dp  pt    Capillary Refill Time:   Hair growth  Skin:    Edema:    Neurologic:      Musculoskeletal/ Orthopedic examination:    NAIL  Web space   Derm:          Assessment and Plan:Shaina was seen today for post-op check and foot pain. Diagnoses and all orders for this visit:    Disorder of right sural nerve    Pain following surgery or procedure    Pain in right foot    Other orders  -     nabumetone (RELAFEN) 500 MG tablet; Take 1 tablet by mouth 2 times daily    Tylenol  bid     Return in about 3 months (around 11/28/2023), or if symptoms worsen or fail to improve. work   no standing more then  1 hour per day   x 8 weeks  must sit if needed do to ankle swelling. Seen By:  David Serrato DPM      Document was created using voice recognition software. Note was reviewed, however may contain grammatical errors.

## 2023-11-06 ENCOUNTER — OFFICE VISIT (OUTPATIENT)
Dept: PODIATRY | Age: 63
End: 2023-11-06
Payer: COMMERCIAL

## 2023-11-06 VITALS — WEIGHT: 187 LBS | TEMPERATURE: 97.7 F | BODY MASS INDEX: 32.1 KG/M2

## 2023-11-06 DIAGNOSIS — G89.18 PAIN FOLLOWING SURGERY OR PROCEDURE: ICD-10-CM

## 2023-11-06 DIAGNOSIS — G57.81 DISORDER OF RIGHT SURAL NERVE: Primary | ICD-10-CM

## 2023-11-06 PROCEDURE — 99212 OFFICE O/P EST SF 10 MIN: CPT | Performed by: PODIATRIST

## 2023-11-06 NOTE — PROGRESS NOTES
Skin Exam: skin intact; Neurovascular  Dorsalis pedis: 3+  Posterior tibial: 3+  Saphenous nerve sensation: normal  Tibial nerve sensation: normal  Superficial peroneal nerve sensation: normal  Deep peroneal nerve sensation: normal  Sural nerve sensation: normal  Achilles reflex: 2+  Babinski reflex: 2+    Muscle Strength  Ankle dorsiflexion: 5  Ankle plantar flexion: 5  Ankle inversion: 5  Ankle eversion: 5  Great toe extension: 5  Great toe flexion: 5    Range of Motion    Passive  Ankle dorsiflexion: pain  Ankle plantar flexion: pain  Ankle eversion: pain  Ankle inversion: pain      Tests  Anterior drawer: negative              Right Ankle Exam     Tenderness   The patient is experiencing tenderness in the ATF, deltoid and lateral malleolus. Swelling: mild    Range of Motion   Dorsiflexion:  abnormal   Plantar flexion:  abnormal   Eversion:  abnormal   Inversion:  abnormal     Muscle Strength   The patient has normal right ankle strength. Dorsiflexion:  5/5  Plantar flexion:  5/5             Vascular: pulses  dp  pt    Capillary Refill Time:   Hair growth  Skin:    Edema:    Neurologic:      Musculoskeletal/ Orthopedic examination:    NAIL  Web space   Derm:          Assessment and Plan:Shaina was seen today for post-op check and foot pain. Diagnoses and all orders for this visit:    Disorder of right sural nerve    Pain following surgery or procedure      Tylenol  bid     No follow-ups on file. work   no standing more then  1 hour per day   x 8 weeks  must sit if needed do to ankle swelling. Seen By:  Landen Vaz DPM      Document was created using voice recognition software. Note was reviewed, however may contain grammatical errors.

## 2023-12-13 ENCOUNTER — OFFICE VISIT (OUTPATIENT)
Dept: FAMILY MEDICINE CLINIC | Age: 63
End: 2023-12-13
Payer: COMMERCIAL

## 2023-12-13 VITALS
HEIGHT: 64 IN | WEIGHT: 194 LBS | DIASTOLIC BLOOD PRESSURE: 78 MMHG | SYSTOLIC BLOOD PRESSURE: 138 MMHG | TEMPERATURE: 97.6 F | HEART RATE: 84 BPM | OXYGEN SATURATION: 96 % | BODY MASS INDEX: 33.12 KG/M2

## 2023-12-13 DIAGNOSIS — M79.671 RIGHT FOOT PAIN: Primary | ICD-10-CM

## 2023-12-13 PROCEDURE — 99213 OFFICE O/P EST LOW 20 MIN: CPT | Performed by: FAMILY MEDICINE

## 2023-12-13 ASSESSMENT — PATIENT HEALTH QUESTIONNAIRE - PHQ9
2. FEELING DOWN, DEPRESSED OR HOPELESS: 0
SUM OF ALL RESPONSES TO PHQ QUESTIONS 1-9: 0
SUM OF ALL RESPONSES TO PHQ9 QUESTIONS 1 & 2: 0
SUM OF ALL RESPONSES TO PHQ QUESTIONS 1-9: 0
1. LITTLE INTEREST OR PLEASURE IN DOING THINGS: 0
SUM OF ALL RESPONSES TO PHQ QUESTIONS 1-9: 0
SUM OF ALL RESPONSES TO PHQ QUESTIONS 1-9: 0

## 2023-12-13 ASSESSMENT — ENCOUNTER SYMPTOMS
GASTROINTESTINAL NEGATIVE: 1
RESPIRATORY NEGATIVE: 1

## 2024-01-15 ENCOUNTER — OFFICE VISIT (OUTPATIENT)
Dept: PODIATRY | Age: 64
End: 2024-01-15
Payer: COMMERCIAL

## 2024-01-15 VITALS
SYSTOLIC BLOOD PRESSURE: 126 MMHG | TEMPERATURE: 97.1 F | BODY MASS INDEX: 32.61 KG/M2 | WEIGHT: 190 LBS | DIASTOLIC BLOOD PRESSURE: 78 MMHG

## 2024-01-15 DIAGNOSIS — S86.301D INJURY OF PERONEAL TENDON OF RIGHT FOOT, SUBSEQUENT ENCOUNTER: Primary | ICD-10-CM

## 2024-01-15 DIAGNOSIS — G57.81 DISORDER OF RIGHT SURAL NERVE: ICD-10-CM

## 2024-01-15 PROCEDURE — 99213 OFFICE O/P EST LOW 20 MIN: CPT | Performed by: PODIATRIST

## 2024-01-15 NOTE — PROGRESS NOTES
Medical History:   Diagnosis Date    Asthma     well controlled per patient    Right foot pain     For OR 10/14/22     Past Surgical History:   Procedure Laterality Date    ANKLE SURGERY Right 2021    REPAIR ANTERIOR TALAR FIBULAR LIGAMENT RIGHT FOOT REPAIR PERONEAL TENDON RIGHT FOOT, SURAL NERVE LYSIS  W PRP performed by Saturnino Marshall DPM at [facility] OR     SECTION      FEMUR FRACTURE SURGERY Right 2023    RETROGRADE INTRAMEDULLARY NAILING RIGHT FEMUR performed by Peña Yates DO at [facility] OR    FOOT SURGERY Right     Tendon repair    HYSTERECTOMY (CERVIX STATUS UNKNOWN)      KNEE SURGERY Bilateral     LEG SURGERY Right 10/14/2022    SURAL NERVE RESECTION RIGHT FOOT performed by Saturnino Marshall DPM at [facility] OR    MENISCECTOMY Right      No family history on file.  Social History     Socioeconomic History    Marital status:      Spouse name: Not on file    Number of children: Not on file    Years of education: Not on file    Highest education level: Not on file   Occupational History    Not on file   Tobacco Use    Smoking status: Former     Current packs/day: 0.00     Types: Cigarettes     Quit date: 1996     Years since quittin.7    Smokeless tobacco: Never   Vaping Use    Vaping Use: Never used   Substance and Sexual Activity    Alcohol use: Not Currently    Drug use: Never    Sexual activity: Not on file   Other Topics Concern    Not on file   Social History Narrative    Not on file     Social Determinants of Health     Financial Resource Strain: Not on file   Food Insecurity: Not on file   Transportation Needs: Not on file   Physical Activity: Not on file   Stress: Not on file   Social Connections: Not on file   Intimate Partner Violence: Not on file   Housing Stability: Not on file       Vitals:    01/15/24 1511   BP: 126/78   Temp: 97.1 °F (36.2 °C)   TempSrc: Temporal   Weight: 86.2 kg (190 lb)       Focused Lower Extremity Physical Exam:  Vitals:    01/15/24 1511   BP: 126/78

## 2024-01-22 ENCOUNTER — HOSPITAL ENCOUNTER (OUTPATIENT)
Dept: MRI IMAGING | Age: 64
Discharge: HOME | End: 2024-01-24
Attending: PODIATRIST
Payer: COMMERCIAL

## 2024-01-22 DIAGNOSIS — S86.301D INJURY OF PERONEAL TENDON OF RIGHT FOOT, SUBSEQUENT ENCOUNTER: ICD-10-CM

## 2024-01-22 DIAGNOSIS — G57.81 DISORDER OF RIGHT SURAL NERVE: ICD-10-CM

## 2024-01-22 PROCEDURE — 73721 MRI JNT OF LWR EXTRE W/O DYE: CPT

## 2024-02-26 ENCOUNTER — TELEPHONE (OUTPATIENT)
Dept: PODIATRY | Age: 64
End: 2024-02-26

## 2024-02-26 ENCOUNTER — OFFICE VISIT (OUTPATIENT)
Dept: PODIATRY | Age: 64
End: 2024-02-26
Payer: COMMERCIAL

## 2024-02-26 VITALS
DIASTOLIC BLOOD PRESSURE: 83 MMHG | TEMPERATURE: 97.8 F | BODY MASS INDEX: 32.61 KG/M2 | WEIGHT: 190 LBS | SYSTOLIC BLOOD PRESSURE: 138 MMHG

## 2024-02-26 DIAGNOSIS — S86.301D INJURY OF PERONEAL TENDON OF RIGHT FOOT, SUBSEQUENT ENCOUNTER: ICD-10-CM

## 2024-02-26 DIAGNOSIS — G57.81 DISORDER OF RIGHT SURAL NERVE: Primary | ICD-10-CM

## 2024-02-26 PROCEDURE — 99213 OFFICE O/P EST LOW 20 MIN: CPT | Performed by: PODIATRIST

## 2024-02-26 RX ORDER — NABUMETONE 500 MG/1
500 TABLET, FILM COATED ORAL 2 TIMES DAILY
Qty: 180 TABLET | Refills: 0 | Status: SHIPPED | OUTPATIENT
Start: 2024-02-26 | End: 2024-05-26

## 2024-02-26 RX ORDER — CLOBETASOL PROPIONATE 0.5 MG/G
OINTMENT TOPICAL
Qty: 60 G | Refills: 1 | Status: SHIPPED | OUTPATIENT
Start: 2024-02-26

## 2024-02-26 NOTE — PROGRESS NOTES
Shaina Ceja : 1960 Sex: female  Age: 63 y.o.    Patient was referred by Bruno Godinez DO    Chief Complaint   Patient presents with    Foot Pain     Today we are going over MRI results right ankle   In cam walker        SUBJECTIVE patient is seen today for chronic right ankle pain.  Patient relates that ever since she had that accident where she fractured her tibia and femur had major reconstructive surgery she is still having extreme pain on the ankle patient has a hard time standing more than 10 minutes without pain.  Patient relates that the pain radiates to the outside of her ankle.  Foot Pain   This is a recurrent problem. The current episode started more than 1 year ago. There has been no history of extremity trauma. The problem occurs 2 to 4 times per day.     Review of Systems  Const: Denies constitutional symptoms  Musculo: Denies symptoms other than stated above  Skin: Denies symptoms other than stated above       Current Outpatient Medications:     nabumetone (RELAFEN) 500 MG tablet, Take 1 tablet by mouth 2 times daily, Disp: 180 tablet, Rfl: 0    Handicap Placard MISC, by Does not apply route 5-year duration for handicap sticker patient unable to ambulate more than 20 feet due to chronic right ankle pain and nerve issues (Patient not taking: Reported on 2023), Disp: 1 each, Rfl: 0    Omega-3 Fatty Acids (FISH OIL) 1000 MG capsule, Take by mouth daily (Patient not taking: Reported on 2023), Disp: , Rfl:   Allergies   Allergen Reactions    Latex      Leaves marks on skin    Aspirin Hives and Shortness Of Breath     sick  Other reaction(s): Disease type AND/OR category unknown (finding), GI Upset    Bee Venom Anaphylaxis     Other reaction(s): Disease type AND/OR category unknown (finding)    Codeine Hives and Shortness Of Breath     fatique  Other reaction(s): Disease type AND/OR category unknown (finding)    Penicillins Hives and Shortness Of Breath     swelling  Other

## 2024-04-08 ENCOUNTER — OFFICE VISIT (OUTPATIENT)
Dept: PODIATRY | Age: 64
End: 2024-04-08
Payer: COMMERCIAL

## 2024-04-08 VITALS
BODY MASS INDEX: 32.61 KG/M2 | TEMPERATURE: 98.2 F | DIASTOLIC BLOOD PRESSURE: 74 MMHG | WEIGHT: 190 LBS | SYSTOLIC BLOOD PRESSURE: 133 MMHG

## 2024-04-08 DIAGNOSIS — G57.81 DISORDER OF RIGHT SURAL NERVE: Primary | ICD-10-CM

## 2024-04-08 DIAGNOSIS — S86.301D INJURY OF PERONEAL TENDON OF RIGHT FOOT, SUBSEQUENT ENCOUNTER: ICD-10-CM

## 2024-04-08 PROCEDURE — 99212 OFFICE O/P EST SF 10 MIN: CPT | Performed by: PODIATRIST

## 2024-04-08 NOTE — PROGRESS NOTES
Shaina Ceja : 1960 Sex: female  Age: 64 y.o.    Patient was referred by Bruno Godinez DO    Chief Complaint   Patient presents with    Foot Pain    Post-Op Check     Just refilled cream on Relafen       SUBJECTIVE patient is seen today for chronic right ankle pain.  Patient relates that ever since she had that accident where she fractured her tibia and femur had major reconstructive surgery she is still having extreme pain on the ankle patient has a hard time standing more than 10 minutes without pain.  Patient relates that the pain radiates to the outside of her ankle.  Relafen helping   Foot Pain   This is a recurrent problem. The current episode started more than 1 year ago. There has been no history of extremity trauma. The problem occurs every several days. The problem has been gradually improving.     Review of Systems  Const: Denies constitutional symptoms  Musculo: Denies symptoms other than stated above  Skin: Denies symptoms other than stated above       Current Outpatient Medications:     nabumetone (RELAFEN) 500 MG tablet, Take 1 tablet by mouth 2 times daily, Disp: 180 tablet, Rfl: 0    clobetasol (TEMOVATE) 0.05 % ointment, Apply topically 2 times daily., Disp: 60 g, Rfl: 1    Handicap Placard MISC, by Does not apply route 5-year duration for handicap sticker patient unable to ambulate more than 20 feet due to chronic right ankle pain and nerve issues (Patient not taking: Reported on 2023), Disp: 1 each, Rfl: 0    Omega-3 Fatty Acids (FISH OIL) 1000 MG capsule, Take by mouth daily (Patient not taking: Reported on 2023), Disp: , Rfl:   Allergies   Allergen Reactions    Latex      Leaves marks on skin    Aspirin Hives and Shortness Of Breath     sick  Other reaction(s): Disease type AND/OR category unknown (finding), GI Upset    Bee Venom Anaphylaxis     Other reaction(s): Disease type AND/OR category unknown (finding)    Codeine Hives and Shortness Of Breath

## 2024-07-01 ENCOUNTER — OFFICE VISIT (OUTPATIENT)
Dept: PODIATRY | Age: 64
End: 2024-07-01
Payer: COMMERCIAL

## 2024-07-01 VITALS — TEMPERATURE: 97.5 F | BODY MASS INDEX: 32.61 KG/M2 | WEIGHT: 190 LBS

## 2024-07-01 DIAGNOSIS — G89.18 PAIN FOLLOWING SURGERY OR PROCEDURE: ICD-10-CM

## 2024-07-01 DIAGNOSIS — G57.81 DISORDER OF RIGHT SURAL NERVE: Primary | ICD-10-CM

## 2024-07-01 DIAGNOSIS — S86.301D INJURY OF PERONEAL TENDON OF RIGHT FOOT, SUBSEQUENT ENCOUNTER: ICD-10-CM

## 2024-07-01 PROCEDURE — 99213 OFFICE O/P EST LOW 20 MIN: CPT | Performed by: PODIATRIST

## 2024-07-01 RX ORDER — NABUMETONE 500 MG/1
500 TABLET, FILM COATED ORAL 2 TIMES DAILY
Qty: 180 TABLET | Refills: 0 | Status: SHIPPED | OUTPATIENT
Start: 2024-07-01 | End: 2024-09-29

## 2024-07-01 NOTE — PROGRESS NOTES
appreciable denervation muscle edema or atrophy.  4. 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                          Assessment and Plan: tumeric  relafen    pt may stand no more then 2 hours per shift   pt may drive      Shaina was seen today for foot pain.    Diagnoses and all orders for this visit:    Disorder of right sural nerve    Injury of peroneal tendon of right foot, subsequent encounter    Pain following surgery or procedure    Other orders  -     nabumetone (RELAFEN) 500 MG tablet; Take 1 tablet by mouth 2 times daily        Return in about 6 months (around 1/1/2025).      Seen By:  Saturnino Marshall DPM      Document was created using voice recognition software.  Note was reviewed, however may contain grammatical errors.

## 2024-09-16 ENCOUNTER — OFFICE VISIT (OUTPATIENT)
Dept: PODIATRY | Age: 64
End: 2024-09-16
Payer: COMMERCIAL

## 2024-09-16 VITALS
TEMPERATURE: 97.8 F | DIASTOLIC BLOOD PRESSURE: 78 MMHG | WEIGHT: 190 LBS | BODY MASS INDEX: 32.61 KG/M2 | SYSTOLIC BLOOD PRESSURE: 134 MMHG

## 2024-09-16 DIAGNOSIS — S86.301D INJURY OF PERONEAL TENDON OF RIGHT FOOT, SUBSEQUENT ENCOUNTER: ICD-10-CM

## 2024-09-16 DIAGNOSIS — G57.81 DISORDER OF RIGHT SURAL NERVE: Primary | ICD-10-CM

## 2024-09-16 PROCEDURE — 99212 OFFICE O/P EST SF 10 MIN: CPT | Performed by: PODIATRIST

## 2024-11-25 ENCOUNTER — OFFICE VISIT (OUTPATIENT)
Dept: PODIATRY | Age: 64
End: 2024-11-25
Payer: COMMERCIAL

## 2024-11-25 VITALS
OXYGEN SATURATION: 95 % | SYSTOLIC BLOOD PRESSURE: 140 MMHG | WEIGHT: 190 LBS | HEART RATE: 77 BPM | TEMPERATURE: 98.5 F | DIASTOLIC BLOOD PRESSURE: 80 MMHG | BODY MASS INDEX: 32.61 KG/M2

## 2024-11-25 DIAGNOSIS — L30.9 DERMATITIS: ICD-10-CM

## 2024-11-25 DIAGNOSIS — G57.81 DISORDER OF RIGHT SURAL NERVE: Primary | ICD-10-CM

## 2024-11-25 PROCEDURE — 99213 OFFICE O/P EST LOW 20 MIN: CPT | Performed by: PODIATRIST

## 2024-11-25 RX ORDER — TRIAMCINOLONE ACETONIDE 1 MG/G
OINTMENT TOPICAL DAILY
Qty: 80 G | Refills: 0 | Status: SHIPPED | OUTPATIENT
Start: 2024-11-25 | End: 2024-12-02

## 2024-11-25 NOTE — PROGRESS NOTES
Weight: 86.2 kg (190 lb)       Focused Lower Extremity Physical Exam:  Vitals:    11/25/24 1619   BP: (!) 140/80   Pulse: 77   Temp: 98.5 °F (36.9 °C)   SpO2: 95%        Foot Exam    General  General Appearance: appears stated age and healthy   Orientation: alert and oriented to person, place, and time       Right Foot/Ankle     Inspection and Palpation  Skin Exam: skin changes and abnormal color;     Neurovascular  Dorsalis pedis: 3+  Posterior tibial: 3+  Saphenous nerve sensation: normal  Tibial nerve sensation: normal  Superficial peroneal nerve sensation: normal  Deep peroneal nerve sensation: normal  Sural nerve sensation: diminished  Achilles reflex: 2+  Babinski reflex: 2+    Muscle Strength  Ankle dorsiflexion: 5  Ankle plantar flexion: 5  Ankle inversion: 5  Ankle eversion: 5  Great toe extension: 5  Great toe flexion: 5             Right Ankle Exam     Tenderness   The patient is experiencing tenderness in the ATF, CF, deltoid and medial malleolus.  Swelling: severe    Range of Motion   Dorsiflexion:  abnormal   Plantar flexion:  abnormal   Eversion:  abnormal   Inversion:  abnormal     Muscle Strength   The patient has normal right ankle strength.  Dorsiflexion:  5/5  Plantar flexion:  5/5    Tests   Anterior drawer: positive  Varus tilt: positive    Other   Erythema: absent  Scars: present  Sensation: decreased             Vascular: pulses  dp  pt palpable  Capillary Refill Time:   Hair growth  Skin:    Edema:    Neurologic:      Musculoskeletal/ Orthopedic examination: X-rays were taken and reviewed pain with inversion eversion dorsiflexion plantarflexion of right ankle.  examination of the right ankle reveals pain with palpation to the anterior talofibular ligament pain x-rays were reviewed both of the foot and ankle from the previous x-ray as well as notes showing no osseous pathology    No results found.  Reading Physician Reading Date Result Priority   Fabby Lynn,   [phone]

## 2025-02-24 ENCOUNTER — OFFICE VISIT (OUTPATIENT)
Dept: PODIATRY | Age: 65
End: 2025-02-24
Payer: COMMERCIAL

## 2025-02-24 VITALS
TEMPERATURE: 97.8 F | BODY MASS INDEX: 32.61 KG/M2 | SYSTOLIC BLOOD PRESSURE: 135 MMHG | WEIGHT: 190 LBS | DIASTOLIC BLOOD PRESSURE: 80 MMHG | OXYGEN SATURATION: 96 % | HEART RATE: 91 BPM

## 2025-02-24 DIAGNOSIS — S86.301D INJURY OF PERONEAL TENDON OF RIGHT FOOT, SUBSEQUENT ENCOUNTER: ICD-10-CM

## 2025-02-24 DIAGNOSIS — L30.9 DERMATITIS: ICD-10-CM

## 2025-02-24 DIAGNOSIS — G57.81 DISORDER OF RIGHT SURAL NERVE: Primary | ICD-10-CM

## 2025-02-24 PROCEDURE — 99213 OFFICE O/P EST LOW 20 MIN: CPT | Performed by: PODIATRIST

## 2025-02-24 RX ORDER — CLOBETASOL PROPIONATE 0.5 MG/G
OINTMENT TOPICAL
Qty: 60 G | Refills: 5 | Status: SHIPPED | OUTPATIENT
Start: 2025-02-24

## 2025-02-24 NOTE — PROGRESS NOTES
WITHOUT CONTRAST, 1/22/2024 6:01 pm     TECHNIQUE:  Multiplanar multisequence MRI of the right ankle was performed without the  administration of intravenous contrast.     COMPARISON:  Radiographs from 12/13/2023     HISTORY:  ORDERING SYSTEM PROVIDED HISTORY: Disorder of right sural nerve     FINDINGS:  SYNDESMOTIC LIGAMENTS: Susceptibility artifact near the syndesmosis favoring  previous repair.  No interval widening.  Grossly intact syndesmotic ligaments  on image 17 of series 8.     LATERAL COLLATERAL LIGAMENT COMPLEX: Susceptibility artifact from prior  surgery.  Grossly intact calcaneofibular ligament, posterior talofibular  ligament and suspicion for previous repair of the anterior talofibular  ligament which is not well visualized secondary to susceptibility artifact.     DELTOID LIGAMENT COMPLEX: Intact superficial and deep components.     SINUS TARSI AND SPRING LIGAMENT: Postoperative findings near the sinus tarsi.  No periligamentous edema.  Grossly intact superomedial spring ligament.     MEDIAL TENDONS: Intact posterior tibial tendon, flexor digitorum and flexor  hallucis longus tendons.     LATERAL TENDONS: Intact peroneus brevis and longus tendons.     ANTERIOR TENDONS: The tibialis anterior, extensor hallucis longus and  extensor digitorum longus tendons are normal in position, morphology and  signal.     ACHILLES TENDON: The Achilles tendon is normal in position, morphology and  signal.  No associated bursitis.     PLANTAR FASCIA: The medial and lateral bundles of the plantar fascia are  normal in morphology and signal. There is no evidence of acute plantar  fasciitis or tear.  No evidence of plantar fascial nodules.     TARSAL TUNNEL: There are no obstructing lesions within the tarsal tunnel.  Normal signal of the tibial nerve in the tarsal tunnel.     BONE MARROW: Increased T2 signal at the lateral ankle is favored to be  susceptibility artifact.  No convincing acute fracture.  Normal

## 2025-06-05 ENCOUNTER — OFFICE VISIT (OUTPATIENT)
Dept: PRIMARY CARE CLINIC | Age: 65
End: 2025-06-05
Payer: COMMERCIAL

## 2025-06-05 VITALS
WEIGHT: 194 LBS | HEIGHT: 64 IN | DIASTOLIC BLOOD PRESSURE: 84 MMHG | OXYGEN SATURATION: 96 % | SYSTOLIC BLOOD PRESSURE: 126 MMHG | HEART RATE: 78 BPM | BODY MASS INDEX: 33.12 KG/M2 | TEMPERATURE: 97 F

## 2025-06-05 DIAGNOSIS — J01.90 ACUTE BACTERIAL SINUSITIS: ICD-10-CM

## 2025-06-05 DIAGNOSIS — B96.89 ACUTE BACTERIAL SINUSITIS: ICD-10-CM

## 2025-06-05 DIAGNOSIS — E03.9 HYPOTHYROIDISM, UNSPECIFIED TYPE: ICD-10-CM

## 2025-06-05 DIAGNOSIS — E03.9 HYPOTHYROIDISM, UNSPECIFIED TYPE: Primary | ICD-10-CM

## 2025-06-05 DIAGNOSIS — L30.9 DERMATITIS: ICD-10-CM

## 2025-06-05 LAB
ALBUMIN: 4.2 G/DL (ref 3.5–5.2)
ALP BLD-CCNC: 82 U/L (ref 35–104)
ALT SERPL-CCNC: 13 U/L (ref 0–35)
ANION GAP SERPL CALCULATED.3IONS-SCNC: 10 MMOL/L (ref 7–16)
AST SERPL-CCNC: 20 U/L (ref 0–35)
BACTERIA: ABNORMAL
BASOPHILS ABSOLUTE: 0.05 K/UL (ref 0–0.2)
BASOPHILS RELATIVE PERCENT: 1 % (ref 0–2)
BILIRUB SERPL-MCNC: 0.3 MG/DL (ref 0–1.2)
BILIRUBIN DIRECT: <0.1 MG/DL (ref 0–0.2)
BILIRUBIN, INDIRECT: NORMAL MG/DL (ref 0–1)
BILIRUBIN, URINE: NEGATIVE
BUN BLDV-MCNC: 17 MG/DL (ref 8–23)
C-REACTIVE PROTEIN: <3 MG/L (ref 0–5)
CALCIUM SERPL-MCNC: 8.8 MG/DL (ref 8.8–10.2)
CHLORIDE BLD-SCNC: 107 MMOL/L (ref 98–107)
CHOLESTEROL, TOTAL: 255 MG/DL
CO2: 25 MMOL/L (ref 22–29)
COLOR, UA: YELLOW
CREAT SERPL-MCNC: 0.9 MG/DL (ref 0.5–1)
EOSINOPHILS ABSOLUTE: 0.25 K/UL (ref 0.05–0.5)
EOSINOPHILS RELATIVE PERCENT: 6 % (ref 0–6)
EPITHELIAL CELLS, UA: ABNORMAL /HPF
FOLATE: 13.6 NG/ML (ref 4.6–34.8)
GFR, ESTIMATED: 73 ML/MIN/1.73M2
GLUCOSE BLD-MCNC: 88 MG/DL (ref 74–99)
GLUCOSE URINE: NEGATIVE MG/DL
HCT VFR BLD CALC: 39.8 % (ref 34–48)
HDLC SERPL-MCNC: 64 MG/DL
HEMOGLOBIN: 13 G/DL (ref 11.5–15.5)
IMMATURE GRANULOCYTES %: 0 % (ref 0–5)
IMMATURE GRANULOCYTES ABSOLUTE: <0.03 K/UL (ref 0–0.58)
KETONES, URINE: ABNORMAL MG/DL
LDL CHOLESTEROL: 173 MG/DL
LEUKOCYTE ESTERASE, URINE: ABNORMAL
LYMPHOCYTES ABSOLUTE: 1.35 K/UL (ref 1.5–4)
LYMPHOCYTES RELATIVE PERCENT: 30 % (ref 20–42)
MCH RBC QN AUTO: 29.5 PG (ref 26–35)
MCHC RBC AUTO-ENTMCNC: 32.7 G/DL (ref 32–34.5)
MCV RBC AUTO: 90.2 FL (ref 80–99.9)
MONOCYTES ABSOLUTE: 0.42 K/UL (ref 0.1–0.95)
MONOCYTES RELATIVE PERCENT: 9 % (ref 2–12)
MUCUS: PRESENT
NEUTROPHILS ABSOLUTE: 2.5 K/UL (ref 1.8–7.3)
NEUTROPHILS RELATIVE PERCENT: 55 % (ref 43–80)
NITRITE, URINE: NEGATIVE
PDW BLD-RTO: 12.8 % (ref 11.5–15)
PH, URINE: 6 (ref 5–8)
PLATELET # BLD: 180 K/UL (ref 130–450)
PMV BLD AUTO: 11.8 FL (ref 7–12)
POTASSIUM SERPL-SCNC: 4 MMOL/L (ref 3.5–5.1)
PROTEIN UA: NEGATIVE MG/DL
RBC # BLD: 4.41 M/UL (ref 3.5–5.5)
RBC UA: ABNORMAL /HPF
SODIUM BLD-SCNC: 142 MMOL/L (ref 136–145)
SPECIFIC GRAVITY UA: >1.03 (ref 1–1.03)
T4 FREE: 0.9 NG/DL (ref 0.9–1.7)
TOTAL PROTEIN: 6.6 G/DL (ref 6.4–8.3)
TRIGL SERPL-MCNC: 87 MG/DL
TSH SERPL DL<=0.05 MIU/L-ACNC: 1.96 UIU/ML (ref 0.27–4.2)
TURBIDITY: ABNORMAL
URIC ACID: 5.4 MG/DL (ref 2.4–5.7)
URINE HGB: NEGATIVE
UROBILINOGEN, URINE: 0.2 EU/DL (ref 0–1)
VITAMIN B-12: 377 PG/ML (ref 232–1245)
VLDLC SERPL CALC-MCNC: 17 MG/DL
WBC # BLD: 4.6 K/UL (ref 4.5–11.5)
WBC UA: ABNORMAL /HPF

## 2025-06-05 PROCEDURE — 99214 OFFICE O/P EST MOD 30 MIN: CPT | Performed by: FAMILY MEDICINE

## 2025-06-05 RX ORDER — TRIAMCINOLONE ACETONIDE 1 MG/G
OINTMENT TOPICAL 2 TIMES DAILY
Qty: 80 G | Refills: 5 | Status: SHIPPED | OUTPATIENT
Start: 2025-06-05 | End: 2025-06-12

## 2025-06-05 RX ORDER — DOXYCYCLINE 100 MG/1
100 TABLET ORAL 2 TIMES DAILY
Qty: 20 TABLET | Refills: 0 | Status: SHIPPED | OUTPATIENT
Start: 2025-06-05 | End: 2025-06-15

## 2025-06-05 RX ORDER — PREDNISONE 10 MG/1
TABLET ORAL
Qty: 30 TABLET | Refills: 0 | Status: SHIPPED | OUTPATIENT
Start: 2025-06-05

## 2025-06-05 SDOH — ECONOMIC STABILITY: FOOD INSECURITY: WITHIN THE PAST 12 MONTHS, YOU WORRIED THAT YOUR FOOD WOULD RUN OUT BEFORE YOU GOT MONEY TO BUY MORE.: NEVER TRUE

## 2025-06-05 SDOH — ECONOMIC STABILITY: FOOD INSECURITY: WITHIN THE PAST 12 MONTHS, THE FOOD YOU BOUGHT JUST DIDN'T LAST AND YOU DIDN'T HAVE MONEY TO GET MORE.: NEVER TRUE

## 2025-06-05 ASSESSMENT — PATIENT HEALTH QUESTIONNAIRE - PHQ9
SUM OF ALL RESPONSES TO PHQ QUESTIONS 1-9: 0
SUM OF ALL RESPONSES TO PHQ QUESTIONS 1-9: 0
2. FEELING DOWN, DEPRESSED OR HOPELESS: NOT AT ALL
SUM OF ALL RESPONSES TO PHQ QUESTIONS 1-9: 0
SUM OF ALL RESPONSES TO PHQ QUESTIONS 1-9: 0
1. LITTLE INTEREST OR PLEASURE IN DOING THINGS: NOT AT ALL

## 2025-06-05 NOTE — ASSESSMENT & PLAN NOTE
Repeat labs today.    Orders:    CBC with Auto Differential; Future    T4, Free; Future    Uric Acid; Future    Vitamin B12 & Folate; Future    TSH; Future    Hepatic Function Panel; Future    Basic Metabolic Panel; Future    Lipid Panel; Future    Hemoglobin A1C; Future    Urinalysis with Microscopic; Future    Albumin/Creatinine Ratio, Urine; Future    C-Reactive Protein; Future    LANCE; Future    Cyclic Citrul Peptide Antibody, IgG; Future

## 2025-06-05 NOTE — PROGRESS NOTES
Shaina Ceja (:  1960) is a 65 y.o. female,Established patient, here for evaluation of the following chief complaint(s):  Pharyngitis and Ear Pain         Assessment & Plan  Hypothyroidism, unspecified type  Repeat labs today.    Orders:    CBC with Auto Differential; Future    T4, Free; Future    Uric Acid; Future    Vitamin B12 & Folate; Future    TSH; Future    Hepatic Function Panel; Future    Basic Metabolic Panel; Future    Lipid Panel; Future    Hemoglobin A1C; Future    Urinalysis with Microscopic; Future    Albumin/Creatinine Ratio, Urine; Future    C-Reactive Protein; Future    LANCE; Future    Cyclic Citrul Peptide Antibody, IgG; Future    Acute bacterial sinusitis  Treat symptomatically.    Orders:    CBC with Auto Differential; Future    T4, Free; Future    Uric Acid; Future    Vitamin B12 & Folate; Future    TSH; Future    Hepatic Function Panel; Future    Basic Metabolic Panel; Future    Lipid Panel; Future    Hemoglobin A1C; Future    Urinalysis with Microscopic; Future    Albumin/Creatinine Ratio, Urine; Future    C-Reactive Protein; Future    LANCE; Future    Cyclic Citrul Peptide Antibody, IgG; Future    doxycycline monohydrate (ADOXA) 100 MG tablet; Take 1 tablet by mouth 2 times daily for 10 days    predniSONE (DELTASONE) 10 MG tablet; Take 4 tabs x 3 days, 3 tabs x 3 days, 2 tabs x 3 days, 1 tab x 3 days, stop.    Dermatitis  Autoimmune labs ordered today    Orders:    CBC with Auto Differential; Future    T4, Free; Future    Uric Acid; Future    Vitamin B12 & Folate; Future    TSH; Future    Hepatic Function Panel; Future    Basic Metabolic Panel; Future    Lipid Panel; Future    Hemoglobin A1C; Future    Urinalysis with Microscopic; Future    Albumin/Creatinine Ratio, Urine; Future    C-Reactive Protein; Future    LANCE; Future    Cyclic Citrul Peptide Antibody, IgG; Future    triamcinolone (KENALOG) 0.1 % ointment; Apply topically 2 times daily for 7 days      Return in about 6

## 2025-06-06 LAB
ANTI-NUCLEAR ANTIBODY (ANA): NEGATIVE
CREATININE URINE: 216 MG/DL (ref 29–226)
HBA1C MFR BLD: 5.4 % (ref 4–5.6)
MICROALBUMIN/CREAT 24H UR: <12 MG/L (ref 0–20)
MICROALBUMIN/CREAT UR-RTO: <6 MCG/MG CREAT (ref 0–30)

## 2025-06-06 ASSESSMENT — ENCOUNTER SYMPTOMS
CONSTIPATION: 0
SORE THROAT: 0
NAUSEA: 0
PHOTOPHOBIA: 0
BLOOD IN STOOL: 0
ABDOMINAL PAIN: 0
VOMITING: 0
COLOR CHANGE: 1
DIARRHEA: 0
SHORTNESS OF BREATH: 0
COUGH: 0
BACK PAIN: 0

## 2025-06-09 LAB — CCP IGG ANTIBODIES: 1 U/ML (ref 0–7)

## 2025-06-10 ENCOUNTER — RESULTS FOLLOW-UP (OUTPATIENT)
Dept: FAMILY MEDICINE CLINIC | Age: 65
End: 2025-06-10

## 2025-06-12 RX ORDER — LEVOFLOXACIN 500 MG/1
500 TABLET, FILM COATED ORAL DAILY
Qty: 7 TABLET | Refills: 0 | Status: SHIPPED | OUTPATIENT
Start: 2025-06-12 | End: 2025-06-19

## 2025-06-23 ENCOUNTER — TELEPHONE (OUTPATIENT)
Dept: PRIMARY CARE CLINIC | Age: 65
End: 2025-06-23

## 2025-06-23 ENCOUNTER — OFFICE VISIT (OUTPATIENT)
Dept: PRIMARY CARE CLINIC | Age: 65
End: 2025-06-23
Payer: COMMERCIAL

## 2025-06-23 VITALS
OXYGEN SATURATION: 98 % | HEIGHT: 64 IN | HEART RATE: 78 BPM | DIASTOLIC BLOOD PRESSURE: 87 MMHG | SYSTOLIC BLOOD PRESSURE: 120 MMHG | WEIGHT: 194 LBS | BODY MASS INDEX: 33.12 KG/M2 | TEMPERATURE: 97 F

## 2025-06-23 DIAGNOSIS — R30.0 DYSURIA: Primary | ICD-10-CM

## 2025-06-23 LAB
BILIRUBIN, POC: NORMAL
BLOOD URINE, POC: NORMAL
CLARITY, POC: NORMAL
COLOR, POC: NORMAL
GLUCOSE URINE, POC: NORMAL MG/DL
KETONES, POC: NORMAL MG/DL
LEUKOCYTE EST, POC: NORMAL
NITRITE, POC: NORMAL
PH, POC: 6
PROTEIN, POC: NORMAL MG/DL
SPECIFIC GRAVITY, POC: 1.02
UROBILINOGEN, POC: 0.2 MG/DL

## 2025-06-23 PROCEDURE — 81002 URINALYSIS NONAUTO W/O SCOPE: CPT | Performed by: FAMILY MEDICINE

## 2025-06-23 PROCEDURE — 99213 OFFICE O/P EST LOW 20 MIN: CPT | Performed by: FAMILY MEDICINE

## 2025-06-23 RX ORDER — NITROFURANTOIN 25; 75 MG/1; MG/1
100 CAPSULE ORAL 2 TIMES DAILY
Qty: 20 CAPSULE | Refills: 0 | Status: SHIPPED | OUTPATIENT
Start: 2025-06-23 | End: 2025-07-03

## 2025-06-23 RX ORDER — METHYLPREDNISOLONE 4 MG/1
TABLET ORAL
Qty: 1 KIT | Refills: 0 | Status: SHIPPED | OUTPATIENT
Start: 2025-06-23

## 2025-06-23 RX ORDER — AZELASTINE 1 MG/ML
1 SPRAY, METERED NASAL 2 TIMES DAILY
Qty: 60 ML | Refills: 1 | Status: SHIPPED | OUTPATIENT
Start: 2025-06-23

## 2025-06-23 ASSESSMENT — ENCOUNTER SYMPTOMS
VOMITING: 0
BACK PAIN: 0
ABDOMINAL PAIN: 0
NAUSEA: 0
CONSTIPATION: 0
COLOR CHANGE: 1
BLOOD IN STOOL: 0
COUGH: 0
SORE THROAT: 0
DIARRHEA: 0
SHORTNESS OF BREATH: 0
PHOTOPHOBIA: 0

## 2025-06-23 NOTE — TELEPHONE ENCOUNTER
Patient calling she is having bilateral ear pain. Also still having back pain concerning uti. Asking if you could order additional antibiotic. She finished doxy and Levaquin last thursday

## 2025-06-23 NOTE — TELEPHONE ENCOUNTER
Would definitely have her come in for reevaluation if she just finished 2 very strong antibiotics.

## 2025-06-23 NOTE — PROGRESS NOTES
Shaina Ceja (:  1960) is a 65 y.o. female,Established patient, here for evaluation of the following chief complaint(s):  Ear Pain, Kidney Problem, and Urinary Tract Infection         Assessment & Plan  Dysuria  KUB ordered to rule out kidney stone.  Will treat symptomatically.  Follow-up as needed.  Initial review of x-ray shows scoliosis and constipation.  Final read per radiologist.    Orders:    POCT Urinalysis no Micro    XR ABDOMEN (KUB) (SINGLE AP VIEW); Future    azelastine (ASTELIN) 0.1 % nasal spray; 1 spray by Nasal route 2 times daily Use in each nostril as directed    nitrofurantoin, macrocrystal-monohydrate, (MACROBID) 100 MG capsule; Take 1 capsule by mouth 2 times daily for 10 days    methylPREDNISolone (MEDROL DOSEPACK) 4 MG tablet; Take by mouth.      No follow-ups on file.       Subjective   HPI  Patient presents today for evaluation of several day history of worsening sore throat and left ear pain.  Patient states that she was concerned about dermatitis versus autoimmune process.  Did follow-up with her otolaryngologist who she states dismissed her concerns.  No recent fever or chills.  No recent joint swelling.  No recent night sweats or weight loss.  No chest pain or shortness of breath.      Update 2025  Patient presents today to follow-up on recent office visit.  Symptoms are still persistent.  Flank pain still slightly worsening.  Ears still feel full and painful.  No fever or chills.  No hematuria.  No urinary discharge.      Review of Systems   Constitutional:  Negative for chills and fever.   HENT:  Positive for congestion and ear pain. Negative for hearing loss, nosebleeds and sore throat.    Eyes:  Negative for photophobia.   Respiratory:  Negative for cough and shortness of breath.    Cardiovascular:  Negative for chest pain, palpitations and leg swelling.   Gastrointestinal:  Negative for abdominal pain, blood in stool, constipation, diarrhea, nausea and vomiting.

## 2025-06-26 ENCOUNTER — RESULTS FOLLOW-UP (OUTPATIENT)
Dept: FAMILY MEDICINE CLINIC | Age: 65
End: 2025-06-26

## 2025-08-25 ENCOUNTER — OFFICE VISIT (OUTPATIENT)
Dept: PODIATRY | Age: 65
End: 2025-08-25
Payer: COMMERCIAL

## 2025-08-25 VITALS
DIASTOLIC BLOOD PRESSURE: 93 MMHG | SYSTOLIC BLOOD PRESSURE: 130 MMHG | WEIGHT: 194 LBS | TEMPERATURE: 97.4 F | BODY MASS INDEX: 33.3 KG/M2

## 2025-08-25 DIAGNOSIS — T30.0 BURN: ICD-10-CM

## 2025-08-25 DIAGNOSIS — G57.81 DISORDER OF RIGHT SURAL NERVE: Primary | ICD-10-CM

## 2025-08-25 PROCEDURE — 99213 OFFICE O/P EST LOW 20 MIN: CPT | Performed by: PODIATRIST

## 2025-08-25 RX ORDER — DOXYCYCLINE HYCLATE 100 MG
100 TABLET ORAL 2 TIMES DAILY
Qty: 20 TABLET | Refills: 0 | Status: SHIPPED | OUTPATIENT
Start: 2025-08-25 | End: 2025-09-04

## (undated) DEVICE — INTENDED FOR TISSUE SEPARATION, AND OTHER PROCEDURES THAT REQUIRE A SHARP SURGICAL BLADE TO PUNCTURE OR CUT.: Brand: BARD-PARKER ® STAINLESS STEEL BLADES

## (undated) DEVICE — SOLUTION IRRIG 3000ML 0.9% SOD CHL USP UROMATIC PLAS CONT

## (undated) DEVICE — 4-PORT MANIFOLD: Brand: NEPTUNE 2

## (undated) DEVICE — PACK PROCEDURE SURG GEN CUST

## (undated) DEVICE — GLOVE SURG SZ 65 THK91MIL LTX FREE SYN POLYISOPRENE

## (undated) DEVICE — DRAPE,REIN 53X77,STERILE: Brand: MEDLINE

## (undated) DEVICE — GLOVE ORTHO 8   MSG9480

## (undated) DEVICE — DRAPE,EXTREMITY,89X128,STERILE: Brand: MEDLINE

## (undated) DEVICE — ROD RMR L950MM DIA2.5MM W/ EXTN BALL TIP

## (undated) DEVICE — STRIP,CLOSURE,WOUND,MEDI-STRIP,1/2X4: Brand: MEDLINE

## (undated) DEVICE — BANDAGE,GAUZE,BULKEE II,4.5"X4.1YD,STRL: Brand: MEDLINE

## (undated) DEVICE — SOLUTION IV IRRIG POUR BRL 0.9% SODIUM CHL 2F7124

## (undated) DEVICE — CONVERTORS STOCKINETTE: Brand: CONVERTORS

## (undated) DEVICE — GAUZE,SPONGE,4"X4",8PLY,STRL,LF,10/TRAY: Brand: MEDLINE

## (undated) DEVICE — TUBING SUCT 12FR MAL ALUM SHFT FN CAP VENT UNIV CONN W/ OBT

## (undated) DEVICE — GOWN,SIRUS,FABRNF,XL,20/CS: Brand: MEDLINE

## (undated) DEVICE — TRAY DRILL SYSTEM 4 REUSABLE

## (undated) DEVICE — DOUBLE BASIN SET: Brand: MEDLINE INDUSTRIES, INC.

## (undated) DEVICE — SKIN PREP TRAY 4 COMPARTM TRAY: Brand: MEDLINE INDUSTRIES, INC.

## (undated) DEVICE — DRAPE,TOP,102X53,STERILE: Brand: MEDLINE

## (undated) DEVICE — TOTAL KNEE PK

## (undated) DEVICE — 3M™ COBAN™ NL STERILE NON-LATEX SELF-ADHERENT WRAP, 2084S, 4 IN X 5 YD (10 CM X 4,5 M), 18 ROLLS/CASE: Brand: 3M™ COBAN™

## (undated) DEVICE — HYPODERMIC SAFETY NEEDLE: Brand: MAGELLAN

## (undated) DEVICE — 3M™ STERI-DRAPE™ U-DRAPE 1015: Brand: STERI-DRAPE™

## (undated) DEVICE — ZIMMER® STERILE DISPOSABLE TOURNIQUET CUFF WITH PLC, DUAL PORT, SINGLE BLADDER, 24 IN. (61 CM)

## (undated) DEVICE — SOLUTION IV IRRIG WATER 1000ML POUR BRL 2F7114

## (undated) DEVICE — PADDING UNDERCAST W4INXL4YD COT FBR LO LINTING WYTEX

## (undated) DEVICE — CHLORAPREP 26ML ORANGE

## (undated) DEVICE — GUIDEWIRE ORTH L400MM DIA3.2MM FOR TFN

## (undated) DEVICE — DRESSING ALG W1.5XL8IN ANTIMIC WND JUMPSTART

## (undated) DEVICE — SYRINGE 20ML LL S/C 50

## (undated) DEVICE — STAPLER SKIN H3.9MM WIRE DIA0.58MM CRWN 6.9MM 35 STPL FIX

## (undated) DEVICE — NEEDLE HYPO 25GA L1.5IN BLU POLYPR HUB S STL REG BVL STR

## (undated) DEVICE — BUR SURG L70MM HD L8MM DIA4MM STR SHANK 235MM 8 FLUT MIC

## (undated) DEVICE — Device

## (undated) DEVICE — KIT INSTR DRL GUID 1.6/1.35MM GUIDWIRE DISP FIBERTAK DX

## (undated) DEVICE — MARKER,SKIN,WI/RULER AND LABELS: Brand: MEDLINE

## (undated) DEVICE — ELECTRODE PT RET AD L9FT HI MOIST COND ADH HYDRGEL CORDED

## (undated) DEVICE — GLOVE ORANGE PI 8   MSG9080

## (undated) DEVICE — PADDING CAST W6INXL4YD COT LO LINTING WYTEX

## (undated) DEVICE — BIT DRL L145MM DIA4.2MM ST 3 FLUT NDL PNT QUIK CPL FOR FEM

## (undated) DEVICE — COVER HNDL LT DISP

## (undated) DEVICE — DRILL MINI CORDLESS

## (undated) DEVICE — C-ARM: Brand: UNBRANDED

## (undated) DEVICE — GLOVE SURG SZ 65 L12IN FNGR THK79MIL GRN LTX FREE

## (undated) DEVICE — SYRINGE MED 30ML STD CLR PLAS LUERLOCK TIP N CTRL DISP

## (undated) DEVICE — PADDING,UNDERCAST,COTTON, 4"X4YD STERILE: Brand: MEDLINE

## (undated) DEVICE — SHOE POSTOP M WOMAN 6-8 UNIV FOAM TRICOT SEMI FLX SKID

## (undated) DEVICE — TOWEL,OR,DSP,ST,BLUE,STD,6/PK,12PK/CS: Brand: MEDLINE

## (undated) DEVICE — GLOVE SURG SZ 6 THK91MIL LTX FREE SYN POLYISOPRENE ANTI

## (undated) DEVICE — BIT DRL QC 2.8X200 MM 110 MM CALIB STRL

## (undated) DEVICE — BIT DRILL CANN 12.8 LRG QC

## (undated) DEVICE — TUBING, SUCTION, 9/32" X 10', STRAIGHT: Brand: MEDLINE

## (undated) DEVICE — SET PSI

## (undated) DEVICE — INSTRUMENT SYSTEM 4 BATTERY REUSABLE

## (undated) DEVICE — BNDG,ELSTC,MATRIX,STRL,6"X5YD,LF,HOOK&LP: Brand: MEDLINE

## (undated) DEVICE — TRAY SYSTEM 7 DRILL REUSABLE

## (undated) DEVICE — GLOVE SURG SZ 8 CRM LTX FREE POLYISOPRENE POLYMER BEAD ANTI

## (undated) DEVICE — PENCIL ES CRD L10FT HND SWCHING ROCK SWCH W/ EDGE COAT BLDE

## (undated) DEVICE — SCISSORS SUT CUTS W/O FRAYED EDG FIBERWIRE

## (undated) DEVICE — NEEDLE HYPO 22GA L1.5IN BLK POLYPR HUB S STL REG BVL STR

## (undated) DEVICE — ADHESIVE SKIN CLSR 0.7ML TOP DERMBND ADV

## (undated) DEVICE — SYSTEM TPS ORTHO

## (undated) DEVICE — DRAPE CARM MINI FOR IMAG SYS INSIGHT FLROSCN

## (undated) DEVICE — STOCKINETTE CAST W2XL900IN NAT COT TBLR

## (undated) DEVICE — 3M™ TEGADERM™ TRANSPARENT FILM DRESSING FRAME STYLE, 1626W, 4 IN X 4-3/4 IN (10 CM X 12 CM), 50/CT 4CT/CASE: Brand: 3M™ TEGADERM™

## (undated) DEVICE — SET ORTHO STD STORTSTD1

## (undated) DEVICE — C-ARMOR C-ARM EQUIPMENT COVERS CLEAR STERILE UNIVERSAL FIT 12 PER CASE: Brand: C-ARMOR

## (undated) DEVICE — BANDAGE COMPR W6INXL10YD ST M E WHITE/BEIGE

## (undated) DEVICE — SYRINGE,CONTROL,LL,FINGER,GRIP: Brand: MEDLINE INDUSTRIES, INC.